# Patient Record
Sex: MALE | Race: WHITE | NOT HISPANIC OR LATINO | URBAN - METROPOLITAN AREA
[De-identification: names, ages, dates, MRNs, and addresses within clinical notes are randomized per-mention and may not be internally consistent; named-entity substitution may affect disease eponyms.]

---

## 2022-09-24 ENCOUNTER — INPATIENT (INPATIENT)
Facility: HOSPITAL | Age: 42
LOS: 8 days | Discharge: HOME | End: 2022-10-03
Attending: HOSPITALIST | Admitting: HOSPITALIST

## 2022-09-24 VITALS
TEMPERATURE: 98 F | RESPIRATION RATE: 18 BRPM | OXYGEN SATURATION: 100 % | SYSTOLIC BLOOD PRESSURE: 131 MMHG | HEART RATE: 99 BPM | DIASTOLIC BLOOD PRESSURE: 83 MMHG

## 2022-09-24 PROCEDURE — 99285 EMERGENCY DEPT VISIT HI MDM: CPT

## 2022-09-24 NOTE — ED ADULT NURSE NOTE - OBJECTIVE STATEMENT
Received pt in room 26. pt A&OX3, hx of ETOH. pt states he is here for detox c/o generalized weakness x 1 week. states drinks 10 beers everyday, reports last drink yesterday. pt appears intoxicated with slurred speech. stating that he's been here all day and did not drink alcohol today. no tremors noted, denies any headache, dizziness, n/v, no chest pain, sob, cough, fever. denies si/hi, auditory or visual hallucinations. denies drug use/ smoking. airway patent, respirations are equal and nonlabored, no respiratory distress noted. pt stable, safety maintained, side rails up, pt in hospital gown, awaiting further plan at this time.

## 2022-09-24 NOTE — ED ADULT TRIAGE NOTE - CHIEF COMPLAINT QUOTE
Pt c/o generalized weakness for 1 week. Endorses drinking 7 beers today. No tremors noted. Denies chest pain, sob, abd pain, n/v/d, fevers/chills. Per mother she picked patient up due to excessive drinking lately. Pmhx: etoh withdrawal.
No

## 2022-09-24 NOTE — ED ADULT NURSE NOTE - CHIEF COMPLAINT QUOTE
Pt c/o generalized weakness for 1 week. Endorses drinking 7 beers today. No tremors noted. Denies chest pain, sob, abd pain, n/v/d, fevers/chills. Per mother she picked patient up due to excessive drinking lately. Pmhx: etoh withdrawal.

## 2022-09-25 DIAGNOSIS — Z79.899 OTHER LONG TERM (CURRENT) DRUG THERAPY: ICD-10-CM

## 2022-09-25 DIAGNOSIS — F10.239 ALCOHOL DEPENDENCE WITH WITHDRAWAL, UNSPECIFIED: ICD-10-CM

## 2022-09-25 DIAGNOSIS — E87.1 HYPO-OSMOLALITY AND HYPONATREMIA: ICD-10-CM

## 2022-09-25 DIAGNOSIS — Z29.9 ENCOUNTER FOR PROPHYLACTIC MEASURES, UNSPECIFIED: ICD-10-CM

## 2022-09-25 DIAGNOSIS — R74.01 ELEVATION OF LEVELS OF LIVER TRANSAMINASE LEVELS: ICD-10-CM

## 2022-09-25 LAB
ALBUMIN SERPL ELPH-MCNC: 4.3 G/DL — SIGNIFICANT CHANGE UP (ref 3.3–5)
ALP SERPL-CCNC: 95 U/L — SIGNIFICANT CHANGE UP (ref 40–120)
ALT FLD-CCNC: 237 U/L — HIGH (ref 4–41)
ANION GAP SERPL CALC-SCNC: 16 MMOL/L — HIGH (ref 7–14)
ANION GAP SERPL CALC-SCNC: 19 MMOL/L — HIGH (ref 7–14)
ANISOCYTOSIS BLD QL: SLIGHT — SIGNIFICANT CHANGE UP
APAP SERPL-MCNC: <10 UG/ML — LOW (ref 15–25)
AST SERPL-CCNC: 433 U/L — HIGH (ref 4–40)
BASOPHILS # BLD AUTO: 0 K/UL — SIGNIFICANT CHANGE UP (ref 0–0.2)
BASOPHILS NFR BLD AUTO: 0 % — SIGNIFICANT CHANGE UP (ref 0–2)
BILIRUB SERPL-MCNC: 1 MG/DL — SIGNIFICANT CHANGE UP (ref 0.2–1.2)
BUN SERPL-MCNC: 5 MG/DL — LOW (ref 7–23)
BUN SERPL-MCNC: 5 MG/DL — LOW (ref 7–23)
CALCIUM SERPL-MCNC: 8.3 MG/DL — LOW (ref 8.4–10.5)
CALCIUM SERPL-MCNC: 8.5 MG/DL — SIGNIFICANT CHANGE UP (ref 8.4–10.5)
CHLORIDE SERPL-SCNC: 81 MMOL/L — LOW (ref 98–107)
CHLORIDE SERPL-SCNC: 88 MMOL/L — LOW (ref 98–107)
CO2 SERPL-SCNC: 22 MMOL/L — SIGNIFICANT CHANGE UP (ref 22–31)
CO2 SERPL-SCNC: 24 MMOL/L — SIGNIFICANT CHANGE UP (ref 22–31)
CREAT SERPL-MCNC: 0.58 MG/DL — SIGNIFICANT CHANGE UP (ref 0.5–1.3)
CREAT SERPL-MCNC: 0.59 MG/DL — SIGNIFICANT CHANGE UP (ref 0.5–1.3)
EGFR: 123 ML/MIN/1.73M2 — SIGNIFICANT CHANGE UP
EGFR: 123 ML/MIN/1.73M2 — SIGNIFICANT CHANGE UP
EOSINOPHIL # BLD AUTO: 0 K/UL — SIGNIFICANT CHANGE UP (ref 0–0.5)
EOSINOPHIL NFR BLD AUTO: 0 % — SIGNIFICANT CHANGE UP (ref 0–6)
ETHANOL SERPL-MCNC: 378 MG/DL — HIGH
FLUAV AG NPH QL: SIGNIFICANT CHANGE UP
FLUBV AG NPH QL: SIGNIFICANT CHANGE UP
GLUCOSE SERPL-MCNC: 100 MG/DL — HIGH (ref 70–99)
GLUCOSE SERPL-MCNC: 87 MG/DL — SIGNIFICANT CHANGE UP (ref 70–99)
HCT VFR BLD CALC: 34.6 % — LOW (ref 39–50)
HGB BLD-MCNC: 12.7 G/DL — LOW (ref 13–17)
IANC: 2.99 K/UL — SIGNIFICANT CHANGE UP (ref 1.8–7.4)
LYMPHOCYTES # BLD AUTO: 0.41 K/UL — LOW (ref 1–3.3)
LYMPHOCYTES # BLD AUTO: 10.5 % — LOW (ref 13–44)
MACROCYTES BLD QL: SLIGHT — SIGNIFICANT CHANGE UP
MCHC RBC-ENTMCNC: 34.7 PG — HIGH (ref 27–34)
MCHC RBC-ENTMCNC: 36.7 GM/DL — HIGH (ref 32–36)
MCV RBC AUTO: 94.5 FL — SIGNIFICANT CHANGE UP (ref 80–100)
MONOCYTES # BLD AUTO: 0.24 K/UL — SIGNIFICANT CHANGE UP (ref 0–0.9)
MONOCYTES NFR BLD AUTO: 6.1 % — SIGNIFICANT CHANGE UP (ref 2–14)
NEUTROPHILS # BLD AUTO: 3.16 K/UL — SIGNIFICANT CHANGE UP (ref 1.8–7.4)
NEUTROPHILS NFR BLD AUTO: 80.7 % — HIGH (ref 43–77)
NEUTS BAND # BLD: 0.9 % — SIGNIFICANT CHANGE UP (ref 0–6)
OSMOLALITY SERPL: 347 MOSM/KG — HIGH (ref 275–295)
PLAT MORPH BLD: NORMAL — SIGNIFICANT CHANGE UP
PLATELET # BLD AUTO: 55 K/UL — LOW (ref 150–400)
PLATELET COUNT - ESTIMATE: ABNORMAL
POLYCHROMASIA BLD QL SMEAR: SLIGHT — SIGNIFICANT CHANGE UP
POTASSIUM SERPL-MCNC: 4.4 MMOL/L — SIGNIFICANT CHANGE UP (ref 3.5–5.3)
POTASSIUM SERPL-MCNC: 4.5 MMOL/L — SIGNIFICANT CHANGE UP (ref 3.5–5.3)
POTASSIUM SERPL-SCNC: 4.4 MMOL/L — SIGNIFICANT CHANGE UP (ref 3.5–5.3)
POTASSIUM SERPL-SCNC: 4.5 MMOL/L — SIGNIFICANT CHANGE UP (ref 3.5–5.3)
PROT SERPL-MCNC: 7.3 G/DL — SIGNIFICANT CHANGE UP (ref 6–8.3)
RBC # BLD: 3.66 M/UL — LOW (ref 4.2–5.8)
RBC # FLD: 12.6 % — SIGNIFICANT CHANGE UP (ref 10.3–14.5)
RBC BLD AUTO: NORMAL — SIGNIFICANT CHANGE UP
RSV RNA NPH QL NAA+NON-PROBE: SIGNIFICANT CHANGE UP
SALICYLATES SERPL-MCNC: <0.3 MG/DL — LOW (ref 15–30)
SARS-COV-2 RNA SPEC QL NAA+PROBE: SIGNIFICANT CHANGE UP
SODIUM SERPL-SCNC: 122 MMOL/L — LOW (ref 135–145)
SODIUM SERPL-SCNC: 128 MMOL/L — LOW (ref 135–145)
TOXICOLOGY SCREEN, DRUGS OF ABUSE, SERUM RESULT: SIGNIFICANT CHANGE UP
VARIANT LYMPHS # BLD: 1.8 % — SIGNIFICANT CHANGE UP (ref 0–6)
WBC # BLD: 3.87 K/UL — SIGNIFICANT CHANGE UP (ref 3.8–10.5)
WBC # FLD AUTO: 3.87 K/UL — SIGNIFICANT CHANGE UP (ref 3.8–10.5)

## 2022-09-25 PROCEDURE — 99223 1ST HOSP IP/OBS HIGH 75: CPT

## 2022-09-25 RX ORDER — SODIUM CHLORIDE 9 MG/ML
1000 INJECTION INTRAMUSCULAR; INTRAVENOUS; SUBCUTANEOUS ONCE
Refills: 0 | Status: COMPLETED | OUTPATIENT
Start: 2022-09-25 | End: 2022-09-25

## 2022-09-25 RX ORDER — THIAMINE MONONITRATE (VIT B1) 100 MG
100 TABLET ORAL ONCE
Refills: 0 | Status: COMPLETED | OUTPATIENT
Start: 2022-09-25 | End: 2022-09-25

## 2022-09-25 RX ORDER — METOPROLOL TARTRATE 50 MG
25 TABLET ORAL DAILY
Refills: 0 | Status: DISCONTINUED | OUTPATIENT
Start: 2022-09-25 | End: 2022-10-03

## 2022-09-25 RX ORDER — FOLIC ACID 0.8 MG
1 TABLET ORAL DAILY
Refills: 0 | Status: DISCONTINUED | OUTPATIENT
Start: 2022-09-25 | End: 2022-09-27

## 2022-09-25 RX ORDER — SODIUM CHLORIDE 9 MG/ML
500 INJECTION INTRAMUSCULAR; INTRAVENOUS; SUBCUTANEOUS ONCE
Refills: 0 | Status: COMPLETED | OUTPATIENT
Start: 2022-09-25 | End: 2022-09-25

## 2022-09-25 RX ORDER — ONDANSETRON 8 MG/1
4 TABLET, FILM COATED ORAL EVERY 8 HOURS
Refills: 0 | Status: DISCONTINUED | OUTPATIENT
Start: 2022-09-25 | End: 2022-09-27

## 2022-09-25 RX ORDER — THIAMINE MONONITRATE (VIT B1) 100 MG
500 TABLET ORAL ONCE
Refills: 0 | Status: COMPLETED | OUTPATIENT
Start: 2022-09-25 | End: 2022-09-25

## 2022-09-25 RX ORDER — THIAMINE MONONITRATE (VIT B1) 100 MG
100 TABLET ORAL DAILY
Refills: 0 | Status: COMPLETED | OUTPATIENT
Start: 2022-09-25 | End: 2022-09-27

## 2022-09-25 RX ADMIN — Medication 4 MILLIGRAM(S): at 10:15

## 2022-09-25 RX ADMIN — Medication 4 MILLIGRAM(S): at 22:09

## 2022-09-25 RX ADMIN — Medication 100 MILLIGRAM(S): at 11:06

## 2022-09-25 RX ADMIN — Medication 100 MILLIGRAM(S): at 22:09

## 2022-09-25 RX ADMIN — SODIUM CHLORIDE 500 MILLILITER(S): 9 INJECTION INTRAMUSCULAR; INTRAVENOUS; SUBCUTANEOUS at 05:53

## 2022-09-25 RX ADMIN — Medication 4 MILLIGRAM(S): at 17:52

## 2022-09-25 RX ADMIN — Medication 25 MILLIGRAM(S): at 22:09

## 2022-09-25 RX ADMIN — Medication 1 MILLIGRAM(S): at 07:56

## 2022-09-25 RX ADMIN — Medication 25 MILLIGRAM(S): at 07:15

## 2022-09-25 RX ADMIN — Medication 4 MILLIGRAM(S): at 13:49

## 2022-09-25 RX ADMIN — Medication 1 TABLET(S): at 11:06

## 2022-09-25 RX ADMIN — Medication 1 MILLIGRAM(S): at 11:06

## 2022-09-25 RX ADMIN — SODIUM CHLORIDE 1000 MILLILITER(S): 9 INJECTION INTRAMUSCULAR; INTRAVENOUS; SUBCUTANEOUS at 01:09

## 2022-09-25 RX ADMIN — Medication 105 MILLIGRAM(S): at 02:49

## 2022-09-25 RX ADMIN — Medication 50 MILLIGRAM(S): at 01:09

## 2022-09-25 NOTE — ED PROVIDER NOTE - NS ED ROS FT
CONST: no fevers, no chills. +anxiety  EYES: no pain, no vision changes  ENT: no sore throat, no ear pain, no change in hearing  CV: no chest pain, no leg swelling  RESP: no shortness of breath, no cough  ABD: no abdominal pain, no nausea, no vomiting, no diarrhea  : no dysuria, no flank pain, no hematuria  MSK: no back pain, no extremity pain  NEURO: no headache. +tremors  SKIN:  no rash

## 2022-09-25 NOTE — CHART NOTE - NSCHARTNOTEFT_GEN_A_CORE
The Drug Utilization Report below displays all of the controlled substance prescriptions, if any, that your patient has filled in the last twelve months. The information displayed on this report is compiled from pharmacy submissions to the Department, and accurately reflects the information as submitted by the pharmacies.    This report was requested by: Naomi Chacko | Reference #: [id number]    Others' Prescriptions  Patient Name: Jeronimo AlvaradoiBirjacqueline Date: 1980  Address: [address] [place] [id number]: Male  Rx Written	Rx Dispensed	Drug	Quantity	Days Supply	Prescriber Name	Prescriber Marie #	Payment Method  07/20/2022	07/20/2022	clonazepam 0.5 mg tablet	60	30	Isai Whaley MD	[id number]	Insurance  Dispenser [facility] #[id number]  03/23/2022	04/05/2022	clonazepam 0.5 mg tablet	60	30	Isai Whaley MD	[id number]	Insurance  Dispenser [facility] #[id number]  * - Drugs marked with an asterisk are compound drugs. If the compound drug is made up of more than one controlled substance, then each controlled substance will be a separate row in the table.

## 2022-09-25 NOTE — H&P ADULT - PROBLEM SELECTOR PLAN 1
-S/p librium (75mg) and ativan (1mg IV) in the ED  -Resume ativan taper  -CIWA monitoring  -c/w thiamine 100mg QD  -c/w Folic acid   -Pt open to possible naltrexone initiation inpatient  -Psych eval in the AM for naltrexone

## 2022-09-25 NOTE — H&P ADULT - NSHPLABSRESULTS_GEN_ALL_CORE
12.7   3.87  )-----------( 55       ( 25 Sep 2022 01:10 )             34.6       09-25    128<L>  |  88<L>  |  5<L>  ----------------------------<  87  4.4   |  24  |  0.59    Ca    8.3<L>      25 Sep 2022 03:30    TPro  7.3  /  Alb  4.3  /  TBili  1.0  /  DBili  x   /  AST  433<H>  /  ALT  237<H>  /  AlkPhos  95  09-25                            CAPILLARY BLOOD GLUCOSE      POCT Blood Glucose.: 127 mg/dL (24 Sep 2022 21:46)              RADIOLOGY & ADDITIONAL TESTS:    Imaging personally reviewed.    Consultant(s) notes reviewed.    Care discussed with consultants and other providers.

## 2022-09-25 NOTE — H&P ADULT - ASSESSMENT
42y male pt PMHx ETOH use disorder presenting w/ tremors and requesting detox, admitted for ETOH withdrawal

## 2022-09-25 NOTE — H&P ADULT - NSHPPHYSICALEXAM_GEN_ALL_CORE
GENERAL: NAD, +b/l hand tremors, +tongue fasciculations   HEAD:  Atraumatic, Normocephalic  EYES: EOMI, conjunctiva and sclera clear  NECK: Supple  CHEST/LUNG: Clear to auscultation bilaterally; No wheeze, rhonchi, crackles or rales  HEART: Regular rate and rhythm; No murmurs, rubs, or gallops  ABDOMEN: Soft, Nontender, Nondistended; Bowel sounds present  EXTREMITIES:  2+ Peripheral Pulses, No clubbing, cyanosis, or edema  PSYCH: AAOx3  NEUROLOGY: non-focal  SKIN: No rashes or lesions

## 2022-09-25 NOTE — H&P ADULT - PROBLEM SELECTOR PLAN 2
-Hyponatremia noted on admission to 122. Likely 2/2 beer potomania  -s/p 1.5L IVF  -Improving  -Obtain serum osm, urine Na, urine Osm  -Monitor BMPs

## 2022-09-25 NOTE — H&P ADULT - HISTORY OF PRESENT ILLNESS
42y male pt PMHx ETOH use disorder presenting w/ tremors and requesting detox. Pt reports chronic hx of ETOH use (15-16 bottles of beer/day), last drink 2 days ago. States he has successful quit drinking in the past but resumed a year ago due to various stresses; mother facing eviction and family conflicts. Reports feeling like he has no support system/people to talk to. Reports going to therapy intermittent. Symptomatically, pt reports feeling "shaky" and anxious. ROS also positive for nausea (no vomiting) and generalized malaise. Otherwise negative. Denies fevers, chills, chest pain, SOB, abdominal pain, constipation, diarrhea.    In the E.D, HR 140s otherwise hemodynamically stable. Labs sig for ETOH level 378, hyponatremia (122) and transaminitis. Pt admitted for further management.

## 2022-09-25 NOTE — H&P ADULT - PROBLEM SELECTOR PLAN 3
-AST/ALT elevation noted on admission suspect in setting of ETOH use disorder +/- NAFLD  -Obtain acute hepatitis panel  -Obtain Lipid profile  -Trend LFTs  -Consider RUQ US if w/ worsening transaminitis  -Avoid hepatotoxic agents

## 2022-09-25 NOTE — ED PROVIDER NOTE - ATTENDING CONTRIBUTION TO CARE
I performed a face-to-face evaluation of the patient and performed a history and physical examination along with the resident or ACP, and/or medical student above.  I agree with the history and physical examination as documented by the resident or ACP, and/or medical student above.  Rosanne:  41yo M w/ self-reported pmh of etoh abuse, reports drinking >10beers daily since covid epidemic began, stating last drink was 1.5days ago, comes in c/o hand tremors, anxiety, and palpitations, asking for detox. Endorses occasionally smoking marijuana, none for "a long time". Denies illicit drug use. Pt is tachycardic w/ b/l hand tremors and mild tongue fasciculations. Labs, fluids, TBA.

## 2022-09-25 NOTE — ED PROVIDER NOTE - CLINICAL SUMMARY MEDICAL DECISION MAKING FREE TEXT BOX
42y male pt PMHx alcohol dependence who presents to ED requesting alcohol detox. In the past has done detox alone. Coming in with hand tremors and anxiety. Endorsing last drink was one day ago. Patient drinks beer daily since covid pandemic. No hallucinations, SI, HI. Will draw labs, ekg. TBA for alcohol withdrawal

## 2022-09-25 NOTE — ED PROVIDER NOTE - PHYSICAL EXAMINATION
GENERAL: Awake, alert, NAD  HEENT: NC/AT, moist mucous membranes, EOMI. No tongue fasciculations  LUNGS: CTAB, no wheezes or crackles   CARDIAC: RRR, no m/r/g  ABDOMEN: Soft, non tender, non distended, no rebound, no guarding  BACK: No midline spinal tenderness, no CVA tenderness  EXT: No edema, no calf tenderness,  no deformities. BL hand tremors.   NEURO:  Moving all extremities.  SKIN: Warm and dry. No rash.

## 2022-09-25 NOTE — ED PROVIDER NOTE - OBJECTIVE STATEMENT
42y male pt PMHx alcohol withdrawal and alcohol use disorder who presents to ED requesting detox. Endorsing drinking daily since covid. Drinks beer daily. Last drink endorsed about 1 day ago. Of note, tachycardic, anxious, and hand tremors. Denies hallucinations, chest pain, n/v.     On exam, hand tremors. no fasciculations.

## 2022-09-25 NOTE — H&P ADULT - PROBLEM SELECTOR PLAN 4
-Pt on Toprol 25mg QD, unclear reason at this time   -Will resume w/ hold parameters  -Obtain further clarification from PCP if able

## 2022-09-25 NOTE — H&P ADULT - NSHPREVIEWOFSYSTEMS_GEN_ALL_CORE
CONSTITUTIONAL:  No weight loss, fever, chills, weakness, +fatigue.  HEENT: No visual loss, blurred vision, No hearing loss, sneezing, congestion, runny nose or sore throat.  SKIN:  No rash or itching.  CARDIOVASCULAR:  No chest pain, chest pressure or chest discomfort. No palpitations.  RESPIRATORY:  No shortness of breath, cough or sputum.  GASTROINTESTINAL:  +nausea, no vomiting or diarrhea. No abdominal pain or blood.  GENITOURINARY:  Denies hematuria, dysuria.   NEUROLOGICAL:  No headache, dizziness. No change in bowel or bladder control.  MUSCULOSKELETAL:  No muscle, back pain, joint pain or stiffness.  HEMATOLOGIC:  No bleeding or bruising.  ENDOCRINOLOGIC:  No reports of sweating, cold or heat intolerance. No polyuria or polydipsia.

## 2022-09-26 DIAGNOSIS — F41.9 ANXIETY DISORDER, UNSPECIFIED: ICD-10-CM

## 2022-09-26 LAB
ALBUMIN SERPL ELPH-MCNC: 4.1 G/DL — SIGNIFICANT CHANGE UP (ref 3.3–5)
ALBUMIN SERPL ELPH-MCNC: 4.6 G/DL — SIGNIFICANT CHANGE UP (ref 3.3–5)
ALP SERPL-CCNC: 100 U/L — SIGNIFICANT CHANGE UP (ref 40–120)
ALP SERPL-CCNC: 115 U/L — SIGNIFICANT CHANGE UP (ref 40–120)
ALT FLD-CCNC: 168 U/L — HIGH (ref 4–41)
ALT FLD-CCNC: 191 U/L — HIGH (ref 4–41)
ANION GAP SERPL CALC-SCNC: 15 MMOL/L — HIGH (ref 7–14)
AST SERPL-CCNC: 213 U/L — HIGH (ref 4–40)
AST SERPL-CCNC: 229 U/L — HIGH (ref 4–40)
BILIRUB SERPL-MCNC: 1.6 MG/DL — HIGH (ref 0.2–1.2)
BILIRUB SERPL-MCNC: 1.7 MG/DL — HIGH (ref 0.2–1.2)
BUN SERPL-MCNC: 6 MG/DL — LOW (ref 7–23)
BUN SERPL-MCNC: 7 MG/DL — SIGNIFICANT CHANGE UP (ref 7–23)
BUN SERPL-MCNC: 8 MG/DL — SIGNIFICANT CHANGE UP (ref 7–23)
CALCIUM SERPL-MCNC: 9.5 MG/DL — SIGNIFICANT CHANGE UP (ref 8.4–10.5)
CALCIUM SERPL-MCNC: 9.5 MG/DL — SIGNIFICANT CHANGE UP (ref 8.4–10.5)
CALCIUM SERPL-MCNC: 9.8 MG/DL — SIGNIFICANT CHANGE UP (ref 8.4–10.5)
CHLORIDE SERPL-SCNC: 90 MMOL/L — LOW (ref 98–107)
CHLORIDE SERPL-SCNC: 92 MMOL/L — LOW (ref 98–107)
CHLORIDE SERPL-SCNC: 93 MMOL/L — LOW (ref 98–107)
CHLORIDE UR-SCNC: 58 MMOL/L — SIGNIFICANT CHANGE UP
CHOLEST SERPL-MCNC: 196 MG/DL — SIGNIFICANT CHANGE UP
CO2 SERPL-SCNC: 23 MMOL/L — SIGNIFICANT CHANGE UP (ref 22–31)
CO2 SERPL-SCNC: 25 MMOL/L — SIGNIFICANT CHANGE UP (ref 22–31)
CO2 SERPL-SCNC: 27 MMOL/L — SIGNIFICANT CHANGE UP (ref 22–31)
CREAT SERPL-MCNC: 0.64 MG/DL — SIGNIFICANT CHANGE UP (ref 0.5–1.3)
CREAT SERPL-MCNC: 0.68 MG/DL — SIGNIFICANT CHANGE UP (ref 0.5–1.3)
CREAT SERPL-MCNC: 0.76 MG/DL — SIGNIFICANT CHANGE UP (ref 0.5–1.3)
EGFR: 114 ML/MIN/1.73M2 — SIGNIFICANT CHANGE UP
EGFR: 118 ML/MIN/1.73M2 — SIGNIFICANT CHANGE UP
EGFR: 120 ML/MIN/1.73M2 — SIGNIFICANT CHANGE UP
GLUCOSE SERPL-MCNC: 132 MG/DL — HIGH (ref 70–99)
GLUCOSE SERPL-MCNC: 138 MG/DL — HIGH (ref 70–99)
GLUCOSE SERPL-MCNC: 93 MG/DL — SIGNIFICANT CHANGE UP (ref 70–99)
HAV IGM SER-ACNC: SIGNIFICANT CHANGE UP
HBV CORE IGM SER-ACNC: SIGNIFICANT CHANGE UP
HBV SURFACE AG SER-ACNC: SIGNIFICANT CHANGE UP
HCT VFR BLD CALC: 37.3 % — LOW (ref 39–50)
HCT VFR BLD CALC: 37.5 % — LOW (ref 39–50)
HCV AB S/CO SERPL IA: 0.07 S/CO — SIGNIFICANT CHANGE UP (ref 0–0.99)
HCV AB SERPL-IMP: SIGNIFICANT CHANGE UP
HDLC SERPL-MCNC: 113 MG/DL — SIGNIFICANT CHANGE UP
HGB BLD-MCNC: 13 G/DL — SIGNIFICANT CHANGE UP (ref 13–17)
HGB BLD-MCNC: 13.4 G/DL — SIGNIFICANT CHANGE UP (ref 13–17)
LIPID PNL WITH DIRECT LDL SERPL: 75 MG/DL — SIGNIFICANT CHANGE UP
MAGNESIUM SERPL-MCNC: 1.6 MG/DL — SIGNIFICANT CHANGE UP (ref 1.6–2.6)
MCHC RBC-ENTMCNC: 34.7 GM/DL — SIGNIFICANT CHANGE UP (ref 32–36)
MCHC RBC-ENTMCNC: 35 PG — HIGH (ref 27–34)
MCHC RBC-ENTMCNC: 35.7 PG — HIGH (ref 27–34)
MCHC RBC-ENTMCNC: 35.9 GM/DL — SIGNIFICANT CHANGE UP (ref 32–36)
MCV RBC AUTO: 101.1 FL — HIGH (ref 80–100)
MCV RBC AUTO: 99.5 FL — SIGNIFICANT CHANGE UP (ref 80–100)
NON HDL CHOLESTEROL: 83 MG/DL — SIGNIFICANT CHANGE UP
NRBC # BLD: 0 /100 WBCS — SIGNIFICANT CHANGE UP (ref 0–0)
NRBC # BLD: 0 /100 WBCS — SIGNIFICANT CHANGE UP (ref 0–0)
NRBC # FLD: 0 K/UL — SIGNIFICANT CHANGE UP (ref 0–0)
NRBC # FLD: 0 K/UL — SIGNIFICANT CHANGE UP (ref 0–0)
OSMOLALITY UR: 327 MOSM/KG — SIGNIFICANT CHANGE UP (ref 50–1200)
PHOSPHATE SERPL-MCNC: 2.9 MG/DL — SIGNIFICANT CHANGE UP (ref 2.5–4.5)
PLATELET # BLD AUTO: 28 K/UL — LOW (ref 150–400)
PLATELET # BLD AUTO: 46 K/UL — LOW (ref 150–400)
POTASSIUM SERPL-MCNC: 3.2 MMOL/L — LOW (ref 3.5–5.3)
POTASSIUM SERPL-MCNC: 3.3 MMOL/L — LOW (ref 3.5–5.3)
POTASSIUM SERPL-MCNC: 3.8 MMOL/L — SIGNIFICANT CHANGE UP (ref 3.5–5.3)
POTASSIUM SERPL-SCNC: 3.2 MMOL/L — LOW (ref 3.5–5.3)
POTASSIUM SERPL-SCNC: 3.3 MMOL/L — LOW (ref 3.5–5.3)
POTASSIUM SERPL-SCNC: 3.8 MMOL/L — SIGNIFICANT CHANGE UP (ref 3.5–5.3)
POTASSIUM UR-SCNC: 23.8 MMOL/L — SIGNIFICANT CHANGE UP
PROT SERPL-MCNC: 7.2 G/DL — SIGNIFICANT CHANGE UP (ref 6–8.3)
PROT SERPL-MCNC: 7.7 G/DL — SIGNIFICANT CHANGE UP (ref 6–8.3)
RBC # BLD: 3.71 M/UL — LOW (ref 4.2–5.8)
RBC # BLD: 3.75 M/UL — LOW (ref 4.2–5.8)
RBC # FLD: 13 % — SIGNIFICANT CHANGE UP (ref 10.3–14.5)
RBC # FLD: 13.4 % — SIGNIFICANT CHANGE UP (ref 10.3–14.5)
SODIUM SERPL-SCNC: 128 MMOL/L — LOW (ref 135–145)
SODIUM SERPL-SCNC: 132 MMOL/L — LOW (ref 135–145)
SODIUM SERPL-SCNC: 135 MMOL/L — SIGNIFICANT CHANGE UP (ref 135–145)
SODIUM UR-SCNC: 57 MMOL/L — SIGNIFICANT CHANGE UP
TRIGL SERPL-MCNC: 39 MG/DL — SIGNIFICANT CHANGE UP
WBC # BLD: 2.88 K/UL — LOW (ref 3.8–10.5)
WBC # BLD: 3.13 K/UL — LOW (ref 3.8–10.5)
WBC # FLD AUTO: 2.88 K/UL — LOW (ref 3.8–10.5)
WBC # FLD AUTO: 3.13 K/UL — LOW (ref 3.8–10.5)

## 2022-09-26 PROCEDURE — 99233 SBSQ HOSP IP/OBS HIGH 50: CPT

## 2022-09-26 RX ORDER — POTASSIUM CHLORIDE 20 MEQ
40 PACKET (EA) ORAL ONCE
Refills: 0 | Status: COMPLETED | OUTPATIENT
Start: 2022-09-26 | End: 2022-09-26

## 2022-09-26 RX ORDER — SODIUM CHLORIDE 9 MG/ML
1000 INJECTION INTRAMUSCULAR; INTRAVENOUS; SUBCUTANEOUS
Refills: 0 | Status: DISCONTINUED | OUTPATIENT
Start: 2022-09-26 | End: 2022-09-26

## 2022-09-26 RX ORDER — INFLUENZA VIRUS VACCINE 15; 15; 15; 15 UG/.5ML; UG/.5ML; UG/.5ML; UG/.5ML
0.5 SUSPENSION INTRAMUSCULAR ONCE
Refills: 0 | Status: DISCONTINUED | OUTPATIENT
Start: 2022-09-26 | End: 2022-10-03

## 2022-09-26 RX ADMIN — Medication 2 MILLIGRAM(S): at 21:57

## 2022-09-26 RX ADMIN — Medication 25 MILLIGRAM(S): at 06:16

## 2022-09-26 RX ADMIN — Medication 4 MILLIGRAM(S): at 06:17

## 2022-09-26 RX ADMIN — Medication 3 MILLIGRAM(S): at 14:42

## 2022-09-26 RX ADMIN — Medication 1 TABLET(S): at 14:43

## 2022-09-26 RX ADMIN — Medication 3 MILLIGRAM(S): at 23:00

## 2022-09-26 RX ADMIN — ONDANSETRON 4 MILLIGRAM(S): 8 TABLET, FILM COATED ORAL at 21:57

## 2022-09-26 RX ADMIN — Medication 3 MILLIGRAM(S): at 11:13

## 2022-09-26 RX ADMIN — ONDANSETRON 4 MILLIGRAM(S): 8 TABLET, FILM COATED ORAL at 14:50

## 2022-09-26 RX ADMIN — SODIUM CHLORIDE 75 MILLILITER(S): 9 INJECTION INTRAMUSCULAR; INTRAVENOUS; SUBCUTANEOUS at 11:17

## 2022-09-26 RX ADMIN — Medication 40 MILLIEQUIVALENT(S): at 18:30

## 2022-09-26 RX ADMIN — Medication 4 MILLIGRAM(S): at 02:59

## 2022-09-26 RX ADMIN — Medication 3 MILLIGRAM(S): at 18:27

## 2022-09-26 RX ADMIN — Medication 100 MILLIGRAM(S): at 14:43

## 2022-09-26 RX ADMIN — Medication 1 MILLIGRAM(S): at 14:43

## 2022-09-26 NOTE — PATIENT PROFILE ADULT - FALL HARM RISK - HARM RISK INTERVENTIONS
Assistance with ambulation/Assistance OOB with selected safe patient handling equipment/Communicate Risk of Fall with Harm to all staff/Monitor for mental status changes/Monitor gait and stability/Reinforce activity limits and safety measures with patient and family/Tailored Fall Risk Interventions/Toileting schedule using arm’s reach rule for commode and bathroom/Use of alarms - bed, chair and/or voice tab/Visual Cue: Yellow wristband and red socks/Bed in lowest position, wheels locked, appropriate side rails in place/Call bell, personal items and telephone in reach/Instruct patient to call for assistance before getting out of bed or chair/Non-slip footwear when patient is out of bed/[place] to call system/Physically safe environment - no spills, clutter or unnecessary equipment/Purposeful Proactive Rounding/Room/bathroom lighting operational, light cord in reach

## 2022-09-26 NOTE — PATIENT PROFILE ADULT - ARRIVAL FROM
Home Topical Sulfur Applications Counseling: Topical Sulfur Counseling: Patient counseled that this medication may cause skin irritation or allergic reactions.  In the event of skin irritation, the patient was advised to reduce the amount of the drug applied or use it less frequently.   The patient verbalized understanding of the proper use and possible adverse effects of topical sulfur application.  All of the patient's questions and concerns were addressed.

## 2022-09-26 NOTE — ED ADULT NURSE REASSESSMENT NOTE - NS ED NURSE REASSESS COMMENT FT1
ACP called regarding CIWA score of 10 for patient, patient medicated w/ librium, recommend IV 2mg Ativan to treat sx. Pt hemodynamically stable, will continue to monitor
Break coverage RN: Pt A&Ox4 resting on stretcher. Respirations even and unlabored, NSR on monitor, sating 99% on RA. CIWA as per flowsheet. Pt well appearing, NAD noted. Pending bed assignment. Bed in lowest, safety maintained. Pt aware to call for assistance prior to getting up.
Received report from night RN: pt A&Ox4, ambulatory, respirations are even and unlabored, sating at 100% on RA, sinus tachycardia on cardiac monitor, VSS, CIWA score went down, pt appears comfortable and in no acute distress at this time.
pt changed into hospital clothing. Pt's mother took his phone and wallet in triage area.
report given to ESSU 3 RN. meal provided. comfort care provided. respirations are even and un labored. safety precautions maintained.
Pt tachycardic to 140s, appears to be shaking, see new CIWA score in flowsheet, ACP aware, x1 time dose librium to be ordered, pt hemodynamically stable @ this time
pt remains laying in stretcher, instructed to stay in stretcher and not get up without assistance. side rails remain up. no injuries noted at this time. pt in no distress. will endorse report to oncoming RN for further plan

## 2022-09-27 LAB
ALBUMIN SERPL ELPH-MCNC: 4.8 G/DL — SIGNIFICANT CHANGE UP (ref 3.3–5)
ALP SERPL-CCNC: 124 U/L — HIGH (ref 40–120)
ALT FLD-CCNC: 150 U/L — HIGH (ref 4–41)
ANION GAP SERPL CALC-SCNC: 15 MMOL/L — HIGH (ref 7–14)
AST SERPL-CCNC: 128 U/L — HIGH (ref 4–40)
BILIRUB SERPL-MCNC: 1.3 MG/DL — HIGH (ref 0.2–1.2)
BUN SERPL-MCNC: 9 MG/DL — SIGNIFICANT CHANGE UP (ref 7–23)
CALCIUM SERPL-MCNC: 10.1 MG/DL — SIGNIFICANT CHANGE UP (ref 8.4–10.5)
CHLORIDE SERPL-SCNC: 97 MMOL/L — LOW (ref 98–107)
CO2 SERPL-SCNC: 26 MMOL/L — SIGNIFICANT CHANGE UP (ref 22–31)
CREAT SERPL-MCNC: 0.81 MG/DL — SIGNIFICANT CHANGE UP (ref 0.5–1.3)
EGFR: 112 ML/MIN/1.73M2 — SIGNIFICANT CHANGE UP
GLUCOSE SERPL-MCNC: 108 MG/DL — HIGH (ref 70–99)
HCT VFR BLD CALC: 38.3 % — LOW (ref 39–50)
HGB BLD-MCNC: 13.8 G/DL — SIGNIFICANT CHANGE UP (ref 13–17)
MAGNESIUM SERPL-MCNC: 1.5 MG/DL — LOW (ref 1.6–2.6)
MCHC RBC-ENTMCNC: 35.8 PG — HIGH (ref 27–34)
MCHC RBC-ENTMCNC: 36 GM/DL — SIGNIFICANT CHANGE UP (ref 32–36)
MCV RBC AUTO: 99.2 FL — SIGNIFICANT CHANGE UP (ref 80–100)
NRBC # BLD: 0 /100 WBCS — SIGNIFICANT CHANGE UP (ref 0–0)
NRBC # FLD: 0 K/UL — SIGNIFICANT CHANGE UP (ref 0–0)
PHOSPHATE SERPL-MCNC: 4.2 MG/DL — SIGNIFICANT CHANGE UP (ref 2.5–4.5)
PLATELET # BLD AUTO: 61 K/UL — LOW (ref 150–400)
POTASSIUM SERPL-MCNC: 3.2 MMOL/L — LOW (ref 3.5–5.3)
POTASSIUM SERPL-SCNC: 3.2 MMOL/L — LOW (ref 3.5–5.3)
PROT SERPL-MCNC: 8 G/DL — SIGNIFICANT CHANGE UP (ref 6–8.3)
RBC # BLD: 3.86 M/UL — LOW (ref 4.2–5.8)
RBC # FLD: 13 % — SIGNIFICANT CHANGE UP (ref 10.3–14.5)
SODIUM SERPL-SCNC: 138 MMOL/L — SIGNIFICANT CHANGE UP (ref 135–145)
WBC # BLD: 3.79 K/UL — LOW (ref 3.8–10.5)
WBC # FLD AUTO: 3.79 K/UL — LOW (ref 3.8–10.5)

## 2022-09-27 PROCEDURE — 70450 CT HEAD/BRAIN W/O DYE: CPT | Mod: 26

## 2022-09-27 PROCEDURE — 99233 SBSQ HOSP IP/OBS HIGH 50: CPT

## 2022-09-27 RX ORDER — THIAMINE MONONITRATE (VIT B1) 100 MG
100 TABLET ORAL DAILY
Refills: 0 | Status: DISCONTINUED | OUTPATIENT
Start: 2022-09-27 | End: 2022-09-27

## 2022-09-27 RX ORDER — POTASSIUM CHLORIDE 20 MEQ
40 PACKET (EA) ORAL ONCE
Refills: 0 | Status: COMPLETED | OUTPATIENT
Start: 2022-09-27 | End: 2022-09-27

## 2022-09-27 RX ORDER — THIAMINE MONONITRATE (VIT B1) 100 MG
100 TABLET ORAL DAILY
Refills: 0 | Status: COMPLETED | OUTPATIENT
Start: 2022-09-27 | End: 2022-09-29

## 2022-09-27 RX ORDER — MAGNESIUM OXIDE 400 MG ORAL TABLET 241.3 MG
400 TABLET ORAL
Refills: 0 | Status: COMPLETED | OUTPATIENT
Start: 2022-09-27 | End: 2022-09-28

## 2022-09-27 RX ORDER — FOLIC ACID 0.8 MG
1 TABLET ORAL DAILY
Refills: 0 | Status: DISCONTINUED | OUTPATIENT
Start: 2022-09-27 | End: 2022-10-03

## 2022-09-27 RX ORDER — FOLIC ACID 0.8 MG
1 TABLET ORAL DAILY
Refills: 0 | Status: DISCONTINUED | OUTPATIENT
Start: 2022-09-27 | End: 2022-09-27

## 2022-09-27 RX ADMIN — Medication 2 MILLIGRAM(S): at 14:57

## 2022-09-27 RX ADMIN — Medication 25 MILLIGRAM(S): at 06:20

## 2022-09-27 RX ADMIN — Medication 2 MILLIGRAM(S): at 06:21

## 2022-09-27 RX ADMIN — Medication 2 MILLIGRAM(S): at 04:51

## 2022-09-27 RX ADMIN — Medication 40 MILLIEQUIVALENT(S): at 12:51

## 2022-09-27 RX ADMIN — Medication 2 MILLIGRAM(S): at 18:15

## 2022-09-27 RX ADMIN — Medication 100 MILLIGRAM(S): at 12:51

## 2022-09-27 RX ADMIN — Medication 1 TABLET(S): at 10:04

## 2022-09-27 RX ADMIN — Medication 2 MILLIGRAM(S): at 22:53

## 2022-09-27 RX ADMIN — Medication 2 MILLIGRAM(S): at 10:05

## 2022-09-27 RX ADMIN — MAGNESIUM OXIDE 400 MG ORAL TABLET 400 MILLIGRAM(S): 241.3 TABLET ORAL at 12:51

## 2022-09-27 RX ADMIN — Medication 2 MILLIGRAM(S): at 02:47

## 2022-09-27 RX ADMIN — Medication 1 MILLIGRAM(S): at 12:50

## 2022-09-27 RX ADMIN — ONDANSETRON 4 MILLIGRAM(S): 8 TABLET, FILM COATED ORAL at 06:20

## 2022-09-27 RX ADMIN — Medication 1 MILLIGRAM(S): at 10:04

## 2022-09-27 RX ADMIN — Medication 100 MILLIGRAM(S): at 10:05

## 2022-09-27 RX ADMIN — Medication 1 TABLET(S): at 12:51

## 2022-09-27 RX ADMIN — MAGNESIUM OXIDE 400 MG ORAL TABLET 400 MILLIGRAM(S): 241.3 TABLET ORAL at 18:14

## 2022-09-27 NOTE — PHYSICAL THERAPY INITIAL EVALUATION ADULT - ASSISTIVE DEVICE:SIT/SUPINE, REHAB EVAL
bed rails [[email or web address]_ALL_OB_FT:[email or web address]],[NS_DeliveryAssist1_OBGYN_ALL_OB_FT:[email or web address]=],[NS_DeliveryRN_OBGYN_ALL_OB_FT:[id number]]

## 2022-09-27 NOTE — CHART NOTE - NSCHARTNOTEFT_GEN_A_CORE
Psychiatry Chart Note:  Chart reviewed, went to evaluate the patient, pt. calm/polite however he declined psychiatry consult.  Denies SI and HI.   Discussed with Dr. Mcconnell, she denies any acute concerns.   Please call [facility] psychiatry if patient agrees to speak with psychiatry or if there is any acute concerns.

## 2022-09-27 NOTE — PHYSICAL THERAPY INITIAL EVALUATION ADULT - PERTINENT HX OF CURRENT PROBLEM, REHAB EVAL
Patient is 42 year old male PMHx ETOH use disorder presenting with tremors and requesting detox, admitted for ETOH withdrawal

## 2022-09-28 LAB
ALBUMIN SERPL ELPH-MCNC: 4.1 G/DL — SIGNIFICANT CHANGE UP (ref 3.3–5)
ALP SERPL-CCNC: 96 U/L — SIGNIFICANT CHANGE UP (ref 40–120)
ALT FLD-CCNC: 112 U/L — HIGH (ref 4–41)
ANION GAP SERPL CALC-SCNC: 13 MMOL/L — SIGNIFICANT CHANGE UP (ref 7–14)
AST SERPL-CCNC: 86 U/L — HIGH (ref 4–40)
BILIRUB SERPL-MCNC: 0.9 MG/DL — SIGNIFICANT CHANGE UP (ref 0.2–1.2)
BUN SERPL-MCNC: 12 MG/DL — SIGNIFICANT CHANGE UP (ref 7–23)
CALCIUM SERPL-MCNC: 9.7 MG/DL — SIGNIFICANT CHANGE UP (ref 8.4–10.5)
CHLORIDE SERPL-SCNC: 99 MMOL/L — SIGNIFICANT CHANGE UP (ref 98–107)
CO2 SERPL-SCNC: 26 MMOL/L — SIGNIFICANT CHANGE UP (ref 22–31)
CREAT SERPL-MCNC: 0.72 MG/DL — SIGNIFICANT CHANGE UP (ref 0.5–1.3)
EGFR: 116 ML/MIN/1.73M2 — SIGNIFICANT CHANGE UP
GLUCOSE SERPL-MCNC: 95 MG/DL — SIGNIFICANT CHANGE UP (ref 70–99)
HCT VFR BLD CALC: 34.8 % — LOW (ref 39–50)
HGB BLD-MCNC: 12.1 G/DL — LOW (ref 13–17)
MAGNESIUM SERPL-MCNC: 1.6 MG/DL — SIGNIFICANT CHANGE UP (ref 1.6–2.6)
MCHC RBC-ENTMCNC: 34.8 GM/DL — SIGNIFICANT CHANGE UP (ref 32–36)
MCHC RBC-ENTMCNC: 35.6 PG — HIGH (ref 27–34)
MCV RBC AUTO: 102.4 FL — HIGH (ref 80–100)
NRBC # BLD: 0 /100 WBCS — SIGNIFICANT CHANGE UP (ref 0–0)
NRBC # FLD: 0 K/UL — SIGNIFICANT CHANGE UP (ref 0–0)
PHOSPHATE SERPL-MCNC: 4.4 MG/DL — SIGNIFICANT CHANGE UP (ref 2.5–4.5)
PLATELET # BLD AUTO: 82 K/UL — LOW (ref 150–400)
POTASSIUM SERPL-MCNC: 3.9 MMOL/L — SIGNIFICANT CHANGE UP (ref 3.5–5.3)
POTASSIUM SERPL-SCNC: 3.9 MMOL/L — SIGNIFICANT CHANGE UP (ref 3.5–5.3)
PROT SERPL-MCNC: 7.2 G/DL — SIGNIFICANT CHANGE UP (ref 6–8.3)
RBC # BLD: 3.4 M/UL — LOW (ref 4.2–5.8)
RBC # FLD: 13 % — SIGNIFICANT CHANGE UP (ref 10.3–14.5)
SODIUM SERPL-SCNC: 138 MMOL/L — SIGNIFICANT CHANGE UP (ref 135–145)
WBC # BLD: 3.03 K/UL — LOW (ref 3.8–10.5)
WBC # FLD AUTO: 3.03 K/UL — LOW (ref 3.8–10.5)

## 2022-09-28 PROCEDURE — 99233 SBSQ HOSP IP/OBS HIGH 50: CPT

## 2022-09-28 RX ORDER — MAGNESIUM SULFATE 500 MG/ML
2 VIAL (ML) INJECTION ONCE
Refills: 0 | Status: COMPLETED | OUTPATIENT
Start: 2022-09-28 | End: 2022-09-28

## 2022-09-28 RX ADMIN — MAGNESIUM OXIDE 400 MG ORAL TABLET 400 MILLIGRAM(S): 241.3 TABLET ORAL at 08:24

## 2022-09-28 RX ADMIN — Medication 1.5 MILLIGRAM(S): at 16:38

## 2022-09-28 RX ADMIN — Medication 1 MILLIGRAM(S): at 11:19

## 2022-09-28 RX ADMIN — Medication 1.5 MILLIGRAM(S): at 11:19

## 2022-09-28 RX ADMIN — Medication 1.5 MILLIGRAM(S): at 03:02

## 2022-09-28 RX ADMIN — Medication 1.5 MILLIGRAM(S): at 19:46

## 2022-09-28 RX ADMIN — Medication 1.5 MILLIGRAM(S): at 06:54

## 2022-09-28 RX ADMIN — Medication 1.5 MILLIGRAM(S): at 23:43

## 2022-09-28 RX ADMIN — Medication 25 MILLIGRAM(S): at 06:54

## 2022-09-28 RX ADMIN — Medication 100 MILLIGRAM(S): at 11:19

## 2022-09-28 RX ADMIN — Medication 1 TABLET(S): at 11:19

## 2022-09-28 RX ADMIN — Medication 25 GRAM(S): at 08:24

## 2022-09-29 LAB
ALBUMIN SERPL ELPH-MCNC: 4.2 G/DL — SIGNIFICANT CHANGE UP (ref 3.3–5)
ALP SERPL-CCNC: 82 U/L — SIGNIFICANT CHANGE UP (ref 40–120)
ALT FLD-CCNC: 127 U/L — HIGH (ref 4–41)
ANION GAP SERPL CALC-SCNC: 12 MMOL/L — SIGNIFICANT CHANGE UP (ref 7–14)
APTT BLD: 25.9 SEC — LOW (ref 27–36.3)
AST SERPL-CCNC: 106 U/L — HIGH (ref 4–40)
BASOPHILS # BLD AUTO: 0.03 K/UL — SIGNIFICANT CHANGE UP (ref 0–0.2)
BASOPHILS NFR BLD AUTO: 1 % — SIGNIFICANT CHANGE UP (ref 0–2)
BILIRUB SERPL-MCNC: 0.8 MG/DL — SIGNIFICANT CHANGE UP (ref 0.2–1.2)
BUN SERPL-MCNC: 9 MG/DL — SIGNIFICANT CHANGE UP (ref 7–23)
CALCIUM SERPL-MCNC: 9.6 MG/DL — SIGNIFICANT CHANGE UP (ref 8.4–10.5)
CHLORIDE SERPL-SCNC: 99 MMOL/L — SIGNIFICANT CHANGE UP (ref 98–107)
CO2 SERPL-SCNC: 25 MMOL/L — SIGNIFICANT CHANGE UP (ref 22–31)
CREAT SERPL-MCNC: 0.69 MG/DL — SIGNIFICANT CHANGE UP (ref 0.5–1.3)
D DIMER BLD IA.RAPID-MCNC: 466 NG/ML DDU — HIGH
EGFR: 117 ML/MIN/1.73M2 — SIGNIFICANT CHANGE UP
EOSINOPHIL # BLD AUTO: 0.05 K/UL — SIGNIFICANT CHANGE UP (ref 0–0.5)
EOSINOPHIL NFR BLD AUTO: 1.7 % — SIGNIFICANT CHANGE UP (ref 0–6)
GLUCOSE SERPL-MCNC: 97 MG/DL — SIGNIFICANT CHANGE UP (ref 70–99)
HCT VFR BLD CALC: 34 % — LOW (ref 39–50)
HGB BLD-MCNC: 11.9 G/DL — LOW (ref 13–17)
IANC: 1.75 K/UL — LOW (ref 1.8–7.4)
IMM GRANULOCYTES NFR BLD AUTO: 0.7 % — SIGNIFICANT CHANGE UP (ref 0–0.9)
INR BLD: 0.96 RATIO — SIGNIFICANT CHANGE UP (ref 0.88–1.16)
LYMPHOCYTES # BLD AUTO: 0.68 K/UL — LOW (ref 1–3.3)
LYMPHOCYTES # BLD AUTO: 22.6 % — SIGNIFICANT CHANGE UP (ref 13–44)
MAGNESIUM SERPL-MCNC: 1.9 MG/DL — SIGNIFICANT CHANGE UP (ref 1.6–2.6)
MCHC RBC-ENTMCNC: 35 GM/DL — SIGNIFICANT CHANGE UP (ref 32–36)
MCHC RBC-ENTMCNC: 36.1 PG — HIGH (ref 27–34)
MCV RBC AUTO: 103 FL — HIGH (ref 80–100)
MONOCYTES # BLD AUTO: 0.48 K/UL — SIGNIFICANT CHANGE UP (ref 0–0.9)
MONOCYTES NFR BLD AUTO: 15.9 % — HIGH (ref 2–14)
NEUTROPHILS # BLD AUTO: 1.75 K/UL — LOW (ref 1.8–7.4)
NEUTROPHILS NFR BLD AUTO: 58.1 % — SIGNIFICANT CHANGE UP (ref 43–77)
NRBC # BLD: 0 /100 WBCS — SIGNIFICANT CHANGE UP (ref 0–0)
NRBC # FLD: 0 K/UL — SIGNIFICANT CHANGE UP (ref 0–0)
NT-PROBNP SERPL-SCNC: 29 PG/ML — SIGNIFICANT CHANGE UP
PHOSPHATE SERPL-MCNC: 3.8 MG/DL — SIGNIFICANT CHANGE UP (ref 2.5–4.5)
PLATELET # BLD AUTO: 125 K/UL — LOW (ref 150–400)
POTASSIUM SERPL-MCNC: 4 MMOL/L — SIGNIFICANT CHANGE UP (ref 3.5–5.3)
POTASSIUM SERPL-SCNC: 4 MMOL/L — SIGNIFICANT CHANGE UP (ref 3.5–5.3)
PROT SERPL-MCNC: 6.9 G/DL — SIGNIFICANT CHANGE UP (ref 6–8.3)
PROTHROM AB SERPL-ACNC: 11.1 SEC — SIGNIFICANT CHANGE UP (ref 10.5–13.4)
RBC # BLD: 3.3 M/UL — LOW (ref 4.2–5.8)
RBC # FLD: 13.3 % — SIGNIFICANT CHANGE UP (ref 10.3–14.5)
SODIUM SERPL-SCNC: 136 MMOL/L — SIGNIFICANT CHANGE UP (ref 135–145)
TROPONIN T, HIGH SENSITIVITY RESULT: <6 NG/L — SIGNIFICANT CHANGE UP
TSH SERPL-MCNC: 1.81 UIU/ML — SIGNIFICANT CHANGE UP (ref 0.27–4.2)
WBC # BLD: 3.01 K/UL — LOW (ref 3.8–10.5)
WBC # FLD AUTO: 3.01 K/UL — LOW (ref 3.8–10.5)

## 2022-09-29 PROCEDURE — 99233 SBSQ HOSP IP/OBS HIGH 50: CPT

## 2022-09-29 PROCEDURE — 71275 CT ANGIOGRAPHY CHEST: CPT | Mod: 26

## 2022-09-29 RX ADMIN — Medication 100 MILLIGRAM(S): at 10:34

## 2022-09-29 RX ADMIN — Medication 1 MILLIGRAM(S): at 14:44

## 2022-09-29 RX ADMIN — Medication 1 MILLIGRAM(S): at 18:46

## 2022-09-29 RX ADMIN — Medication 1 MILLIGRAM(S): at 23:20

## 2022-09-29 RX ADMIN — Medication 1 TABLET(S): at 10:34

## 2022-09-29 RX ADMIN — Medication 1 MILLIGRAM(S): at 10:34

## 2022-09-29 RX ADMIN — Medication 1 MILLIGRAM(S): at 06:38

## 2022-09-29 RX ADMIN — Medication 1 MILLIGRAM(S): at 03:11

## 2022-09-29 RX ADMIN — Medication 25 MILLIGRAM(S): at 06:38

## 2022-09-29 NOTE — CHART NOTE - NSCHARTNOTEFT_GEN_A_CORE
Notified by RN that patients HR was 180. Pt was in the bathroom at the time having a large bowel movement. Once escorted back to bed, HR improved however still sustaining 110-120s. Pt appears more tremulous on exam. Denies hallucinations. Ativan given. Tachycardia likely 2/2 withdrawal --> will c/w Ativan taper however if tachycardia persists despite withdrawal treatment, will need to r/o PE. Will continue to monitor. Discussed with attending Dr. Henderson. Notified by RN that patients HR was 180. Pt was in the bathroom at the time having a large bowel movement. Once escorted back to bed, HR improved however still sustaining 110-120s. Pt appears more tremulous on exam. Denies hallucinations. Ativan given. Tachycardia likely 2/2 withdrawal --> will c/w Ativan taper however if tachycardia persists despite withdrawal treatment, will need to r/o PE. Will continue to monitor. Discussed with attending Dr. Henderson.    Addendum: Pt was still tachy therefore Ddimer ordered and came back elevated 466. CTA chest r/o PE ordered. Cardiac enzymes, coags, and BNP ordered. If CTA positive for PE, will start heparin drip. Discussed with Dr. Henderson. Pt aware and in agreement.

## 2022-09-29 NOTE — PROVIDER CONTACT NOTE (OTHER) - ASSESSMENT
Patient A & o x4 and no acute distress noted. Patient denies dizziness, SOB, palpitation or chest pain
patient ciwa score increased to 8. patient endorses headache and anxiety. RN adminstered standing dose of ativan 3mg p.o.

## 2022-09-30 LAB
ALBUMIN SERPL ELPH-MCNC: 3.9 G/DL — SIGNIFICANT CHANGE UP (ref 3.3–5)
ALP SERPL-CCNC: 75 U/L — SIGNIFICANT CHANGE UP (ref 40–120)
ALT FLD-CCNC: 104 U/L — HIGH (ref 4–41)
ANION GAP SERPL CALC-SCNC: 11 MMOL/L — SIGNIFICANT CHANGE UP (ref 7–14)
AST SERPL-CCNC: 72 U/L — HIGH (ref 4–40)
BILIRUB SERPL-MCNC: 0.7 MG/DL — SIGNIFICANT CHANGE UP (ref 0.2–1.2)
BUN SERPL-MCNC: 9 MG/DL — SIGNIFICANT CHANGE UP (ref 7–23)
CALCIUM SERPL-MCNC: 9 MG/DL — SIGNIFICANT CHANGE UP (ref 8.4–10.5)
CHLORIDE SERPL-SCNC: 94 MMOL/L — LOW (ref 98–107)
CO2 SERPL-SCNC: 22 MMOL/L — SIGNIFICANT CHANGE UP (ref 22–31)
CREAT SERPL-MCNC: 0.85 MG/DL — SIGNIFICANT CHANGE UP (ref 0.5–1.3)
EGFR: 110 ML/MIN/1.73M2 — SIGNIFICANT CHANGE UP
GLUCOSE SERPL-MCNC: 95 MG/DL — SIGNIFICANT CHANGE UP (ref 70–99)
HCT VFR BLD CALC: 31.6 % — LOW (ref 39–50)
HGB BLD-MCNC: 11 G/DL — LOW (ref 13–17)
MAGNESIUM SERPL-MCNC: 1.6 MG/DL — SIGNIFICANT CHANGE UP (ref 1.6–2.6)
MCHC RBC-ENTMCNC: 34.8 GM/DL — SIGNIFICANT CHANGE UP (ref 32–36)
MCHC RBC-ENTMCNC: 35.3 PG — HIGH (ref 27–34)
MCV RBC AUTO: 101.3 FL — HIGH (ref 80–100)
NRBC # BLD: 0 /100 WBCS — SIGNIFICANT CHANGE UP (ref 0–0)
NRBC # FLD: 0 K/UL — SIGNIFICANT CHANGE UP (ref 0–0)
PHOSPHATE SERPL-MCNC: 3.1 MG/DL — SIGNIFICANT CHANGE UP (ref 2.5–4.5)
PLATELET # BLD AUTO: 99 K/UL — LOW (ref 150–400)
POTASSIUM SERPL-MCNC: 3.9 MMOL/L — SIGNIFICANT CHANGE UP (ref 3.5–5.3)
POTASSIUM SERPL-SCNC: 3.9 MMOL/L — SIGNIFICANT CHANGE UP (ref 3.5–5.3)
PROT SERPL-MCNC: 6.7 G/DL — SIGNIFICANT CHANGE UP (ref 6–8.3)
RBC # BLD: 3.12 M/UL — LOW (ref 4.2–5.8)
RBC # FLD: 13.6 % — SIGNIFICANT CHANGE UP (ref 10.3–14.5)
SODIUM SERPL-SCNC: 127 MMOL/L — LOW (ref 135–145)
TROPONIN T, HIGH SENSITIVITY RESULT: 9 NG/L — SIGNIFICANT CHANGE UP
WBC # BLD: 2.8 K/UL — LOW (ref 3.8–10.5)
WBC # FLD AUTO: 2.8 K/UL — LOW (ref 3.8–10.5)

## 2022-09-30 PROCEDURE — 99232 SBSQ HOSP IP/OBS MODERATE 35: CPT

## 2022-09-30 RX ORDER — SODIUM CHLORIDE 9 MG/ML
1000 INJECTION INTRAMUSCULAR; INTRAVENOUS; SUBCUTANEOUS
Refills: 0 | Status: DISCONTINUED | OUTPATIENT
Start: 2022-09-30 | End: 2022-10-01

## 2022-09-30 RX ADMIN — Medication 0.5 MILLIGRAM(S): at 22:01

## 2022-09-30 RX ADMIN — Medication 0.5 MILLIGRAM(S): at 11:40

## 2022-09-30 RX ADMIN — Medication 0.5 MILLIGRAM(S): at 18:49

## 2022-09-30 RX ADMIN — Medication 1 TABLET(S): at 11:41

## 2022-09-30 RX ADMIN — Medication 0.5 MILLIGRAM(S): at 15:24

## 2022-09-30 RX ADMIN — Medication 1 MILLIGRAM(S): at 11:41

## 2022-09-30 RX ADMIN — Medication 0.5 MILLIGRAM(S): at 03:02

## 2022-09-30 RX ADMIN — SODIUM CHLORIDE 75 MILLILITER(S): 9 INJECTION INTRAMUSCULAR; INTRAVENOUS; SUBCUTANEOUS at 13:36

## 2022-09-30 RX ADMIN — Medication 0.5 MILLIGRAM(S): at 07:03

## 2022-09-30 NOTE — DIETITIAN INITIAL EVALUATION ADULT - ORAL INTAKE PTA/DIET HISTORY
Pt lives at home with his wife. They cook together at home. No difficulty obtaining groceries. No specific diet followed PTA. Was taking Multivitamin and omega 3 PTA

## 2022-09-30 NOTE — DIETITIAN INITIAL EVALUATION ADULT - OTHER INFO
Per chart, 43 y/o male with hx of EtOH use, anxiety admitted for EtOH withdrawal.     Nutrition interview: No recent episodes of nausea, vomiting, diarrhea or constipation, BM noted yesterday per RN flowsheets. Denies any chewing/swallowing difficulties. No food allergies. Stated UBW: ~180# Pt believes he was last 180# in May, however unsure of exact timeframe. Pt weight today 164.1# taken today via bedscale by RD upon visit. Pt believes wt loss attributed to increased alcohol consumption and decreased PO intake/appetite at that time. Pt with wt loss x 4 months (-8.8%). Pt stated height @ 5'11". Food preferences explored and noted. Intake is % per RN flowsheets and per pt. Feeding skills: independent. Intake has been increased since admission. Pt also receives food from outside brought in by family. Pt also receiving Ensure Enlive 2x daily (700 gerson and 40 gm protein)... wants to decrease to once daily as Pt states he does not drink both shakes.     Pt declined nutrition education at this time.

## 2022-09-30 NOTE — DIETITIAN INITIAL EVALUATION ADULT - NS FNS DIET ORDER
Diet, Regular:   Supplement Feeding Modality:  Oral  Ensure Enlive Cans or Servings Per Day:  1       Frequency:  Two Times a day (09-26-22 @ 15:23) [Active]

## 2022-09-30 NOTE — DIETITIAN INITIAL EVALUATION ADULT - ADD RECOMMEND
1) Recommend decreased Enlive to 1x/day (350 gerson and 20 gm protein) per Pt request  2) Continue on current diet rx: Regular  3) Continue to Monitor weights, labs, BM's, skin integrity, p.o. intake.  4) RD to f/u prn

## 2022-09-30 NOTE — DIETITIAN INITIAL EVALUATION ADULT - PERTINENT MEDS FT
MEDICATIONS  (STANDING):  folic acid 1 milliGRAM(s) Oral daily  influenza   Vaccine 0.5 milliLiter(s) IntraMuscular once  LORazepam     Tablet   Oral   LORazepam     Tablet 0.5 milliGRAM(s) Oral every 4 hours  metoprolol succinate ER 25 milliGRAM(s) Oral daily  multivitamin 1 Tablet(s) Oral daily  sodium chloride 0.9%. 1000 milliLiter(s) (75 mL/Hr) IV Continuous <Continuous>    MEDICATIONS  (PRN):  LORazepam     Tablet 2 milliGRAM(s) Oral every 2 hours PRN Symptom-triggered 2 point increase in CIWA-Ar  LORazepam     Tablet 2 milliGRAM(s) Oral every 1 hour PRN Symptom-triggered: each CIWA -Ar score 8 or GREATER

## 2022-09-30 NOTE — SBIRT NOTE ADULT - NSSBIRTALCPASSREFTXDET_GEN_A_CORE
SW consulted patient in regards to his drinking. Patient had been  sober for approx. 7 years and only recently started drinking again 3-4 weeks). SW discussed different treatment options with patient. Patient stated he has support and resources but open to further outpatient resources. SW provided a list.

## 2022-10-01 ENCOUNTER — TRANSCRIPTION ENCOUNTER (OUTPATIENT)
Age: 42
End: 2022-10-01

## 2022-10-01 LAB
ALBUMIN SERPL ELPH-MCNC: 4 G/DL — SIGNIFICANT CHANGE UP (ref 3.3–5)
ALP SERPL-CCNC: 65 U/L — SIGNIFICANT CHANGE UP (ref 40–120)
ALT FLD-CCNC: 96 U/L — HIGH (ref 4–41)
ANION GAP SERPL CALC-SCNC: 14 MMOL/L — SIGNIFICANT CHANGE UP (ref 7–14)
AST SERPL-CCNC: 81 U/L — HIGH (ref 4–40)
BILIRUB SERPL-MCNC: 0.5 MG/DL — SIGNIFICANT CHANGE UP (ref 0.2–1.2)
BUN SERPL-MCNC: 9 MG/DL — SIGNIFICANT CHANGE UP (ref 7–23)
CALCIUM SERPL-MCNC: 8.9 MG/DL — SIGNIFICANT CHANGE UP (ref 8.4–10.5)
CHLORIDE SERPL-SCNC: 99 MMOL/L — SIGNIFICANT CHANGE UP (ref 98–107)
CO2 SERPL-SCNC: 20 MMOL/L — LOW (ref 22–31)
CREAT SERPL-MCNC: 0.77 MG/DL — SIGNIFICANT CHANGE UP (ref 0.5–1.3)
EGFR: 113 ML/MIN/1.73M2 — SIGNIFICANT CHANGE UP
GLUCOSE SERPL-MCNC: 106 MG/DL — HIGH (ref 70–99)
HCT VFR BLD CALC: 32.9 % — LOW (ref 39–50)
HGB BLD-MCNC: 11.1 G/DL — LOW (ref 13–17)
MAGNESIUM SERPL-MCNC: 1.9 MG/DL — SIGNIFICANT CHANGE UP (ref 1.6–2.6)
MCHC RBC-ENTMCNC: 33.7 GM/DL — SIGNIFICANT CHANGE UP (ref 32–36)
MCHC RBC-ENTMCNC: 35 PG — HIGH (ref 27–34)
MCV RBC AUTO: 103.8 FL — HIGH (ref 80–100)
NRBC # BLD: 0 /100 WBCS — SIGNIFICANT CHANGE UP (ref 0–0)
NRBC # FLD: 0 K/UL — SIGNIFICANT CHANGE UP (ref 0–0)
PHOSPHATE SERPL-MCNC: 3.9 MG/DL — SIGNIFICANT CHANGE UP (ref 2.5–4.5)
PLATELET # BLD AUTO: 128 K/UL — LOW (ref 150–400)
POTASSIUM SERPL-MCNC: 4.4 MMOL/L — SIGNIFICANT CHANGE UP (ref 3.5–5.3)
POTASSIUM SERPL-SCNC: 4.4 MMOL/L — SIGNIFICANT CHANGE UP (ref 3.5–5.3)
PROT SERPL-MCNC: 6.9 G/DL — SIGNIFICANT CHANGE UP (ref 6–8.3)
RBC # BLD: 3.17 M/UL — LOW (ref 4.2–5.8)
RBC # FLD: 13.1 % — SIGNIFICANT CHANGE UP (ref 10.3–14.5)
SARS-COV-2 RNA SPEC QL NAA+PROBE: SIGNIFICANT CHANGE UP
SODIUM SERPL-SCNC: 133 MMOL/L — LOW (ref 135–145)
WBC # BLD: 2.57 K/UL — LOW (ref 3.8–10.5)
WBC # FLD AUTO: 2.57 K/UL — LOW (ref 3.8–10.5)

## 2022-10-01 PROCEDURE — 99232 SBSQ HOSP IP/OBS MODERATE 35: CPT

## 2022-10-01 RX ORDER — SODIUM CHLORIDE 9 MG/ML
1000 INJECTION INTRAMUSCULAR; INTRAVENOUS; SUBCUTANEOUS
Refills: 0 | Status: DISCONTINUED | OUTPATIENT
Start: 2022-10-01 | End: 2022-10-03

## 2022-10-01 RX ADMIN — Medication 1 TABLET(S): at 11:52

## 2022-10-01 RX ADMIN — Medication 25 MILLIGRAM(S): at 05:08

## 2022-10-01 RX ADMIN — Medication 0.5 MILLIGRAM(S): at 11:52

## 2022-10-01 RX ADMIN — SODIUM CHLORIDE 75 MILLILITER(S): 9 INJECTION INTRAMUSCULAR; INTRAVENOUS; SUBCUTANEOUS at 11:22

## 2022-10-01 RX ADMIN — Medication 1 MILLIGRAM(S): at 11:52

## 2022-10-01 RX ADMIN — SODIUM CHLORIDE 75 MILLILITER(S): 9 INJECTION INTRAMUSCULAR; INTRAVENOUS; SUBCUTANEOUS at 19:52

## 2022-10-01 RX ADMIN — Medication 0.5 MILLIGRAM(S): at 23:19

## 2022-10-01 NOTE — DISCHARGE NOTE PROVIDER - NSDCCPCAREPLAN_GEN_ALL_CORE_FT
PRINCIPAL DISCHARGE DIAGNOSIS  Diagnosis: Alcohol dependence with withdrawal  Assessment and Plan of Treatment: you were admitted with alcohol withdrawl and you had completed ativan taper. please followup with primary care provider in 1-2 weeks      SECONDARY DISCHARGE DIAGNOSES  Diagnosis: Sinus tachycardia  Assessment and Plan of Treatment: you intermittent fast heart rate due to withdrawl, you had CT chest which does not show any blood clot but noted incidental finding of groundglass in the right middle lobe of lung. please repeat CT chest in 3 months    Diagnosis: Transaminitis  Assessment and Plan of Treatment: you liver enzyme were elevated now stable possible due to alcohol use. please followup with your primary care provider for continue monitor    Diagnosis: Hyponatremia  Assessment and Plan of Treatment: you sodium level was low on admission due to decrease oral intake now improved after IV fluid

## 2022-10-01 NOTE — DISCHARGE NOTE PROVIDER - NSDCMRMEDTOKEN_GEN_ALL_CORE_FT
Metoprolol Succinate ER 25 mg oral tablet, extended release: 1 tab(s) orally once a day   folic acid 1 mg oral tablet: 1 tab(s) orally once a day  Metoprolol Succinate ER 25 mg oral tablet, extended release: 1 tab(s) orally once a day  Multiple Vitamins oral tablet: 1 tab(s) orally once a day

## 2022-10-02 LAB
HCT VFR BLD CALC: 32.7 % — LOW (ref 39–50)
HGB BLD-MCNC: 10.9 G/DL — LOW (ref 13–17)
MCHC RBC-ENTMCNC: 33.3 GM/DL — SIGNIFICANT CHANGE UP (ref 32–36)
MCHC RBC-ENTMCNC: 35.3 PG — HIGH (ref 27–34)
MCV RBC AUTO: 105.8 FL — HIGH (ref 80–100)
NRBC # BLD: 0 /100 WBCS — SIGNIFICANT CHANGE UP (ref 0–0)
NRBC # FLD: 0 K/UL — SIGNIFICANT CHANGE UP (ref 0–0)
PLATELET # BLD AUTO: 130 K/UL — LOW (ref 150–400)
RBC # BLD: 3.09 M/UL — LOW (ref 4.2–5.8)
RBC # FLD: 13.5 % — SIGNIFICANT CHANGE UP (ref 10.3–14.5)
WBC # BLD: 2.53 K/UL — LOW (ref 3.8–10.5)
WBC # FLD AUTO: 2.53 K/UL — LOW (ref 3.8–10.5)

## 2022-10-02 PROCEDURE — 99232 SBSQ HOSP IP/OBS MODERATE 35: CPT

## 2022-10-02 RX ADMIN — Medication 0.5 MILLIGRAM(S): at 22:01

## 2022-10-02 RX ADMIN — Medication 1 MILLIGRAM(S): at 11:56

## 2022-10-02 RX ADMIN — Medication 1 TABLET(S): at 11:56

## 2022-10-03 ENCOUNTER — TRANSCRIPTION ENCOUNTER (OUTPATIENT)
Age: 42
End: 2022-10-03

## 2022-10-03 VITALS
RESPIRATION RATE: 17 BRPM | HEART RATE: 87 BPM | SYSTOLIC BLOOD PRESSURE: 109 MMHG | TEMPERATURE: 98 F | OXYGEN SATURATION: 99 % | DIASTOLIC BLOOD PRESSURE: 71 MMHG

## 2022-10-03 PROCEDURE — 99239 HOSP IP/OBS DSCHRG MGMT >30: CPT

## 2022-10-03 RX ORDER — FOLIC ACID 0.8 MG
1 TABLET ORAL
Qty: 30 | Refills: 0
Start: 2022-10-03 | End: 2022-11-01

## 2022-10-03 RX ADMIN — Medication 25 MILLIGRAM(S): at 05:29

## 2022-10-03 RX ADMIN — Medication 1 MILLIGRAM(S): at 11:44

## 2022-10-03 RX ADMIN — Medication 1 TABLET(S): at 11:45

## 2022-10-03 NOTE — PROGRESS NOTE ADULT - PROBLEM SELECTOR PLAN 5
Patient on Toprol 25mg daily   - unclear indication  - continue for now

## 2022-10-03 NOTE — DISCHARGE NOTE NURSING/CASE MANAGEMENT/SOCIAL WORK - PATIENT PORTAL LINK FT
You can access the FollowMyHealth Patient Portal offered by [facility] by registering at the following website: [email or web address]. By joining [employer]’s FollowMyHealth portal, you will also be able to view your health information using other applications (apps) compatible with our system.

## 2022-10-03 NOTE — PROGRESS NOTE ADULT - PROBLEM SELECTOR PROBLEM 1
Alcohol dependence with withdrawal

## 2022-10-03 NOTE — PROGRESS NOTE ADULT - PROBLEM SELECTOR PLAN 4
Used to be on Klonopin  - currently on Ativan for EtOH withdrawal  - does not want to try SSRIs  - refusing to speak with psychiatry
Used to be on Klonopin  - currently on Ativan for EtOH withdrawal  - does not want to try SSRIs  - psychiatry eval requested
Used to be on Klonopin  - currently on Ativan for EtOH withdrawal  - does not want to try SSRIs  - refusing to speak with psychiatry
Used to be on Klonopin  - currently on Ativan for EtOH withdrawal  - does not want to try SSRIs  - refusing to speak with psychiatry

## 2022-10-03 NOTE — PROGRESS NOTE ADULT - SUBJECTIVE AND OBJECTIVE BOX
Janice Mcconnell MD  [facility] Division of Hospital Medicine  Pager [phone] (M-F 8AM-5PM)  Other Times: [phone]    Patient is a 42y old  Male who presents with a chief complaint of ETOH withdrawal (25 Sep 2022 10:25)    SUBJECTIVE / OVERNIGHT EVENTS: reports some tremors, denies visual/auditory hallucinations    MEDICATIONS  (STANDING):  folic acid 1 milliGRAM(s) Oral daily  LORazepam     Tablet 3 milliGRAM(s) Oral every 4 hours  LORazepam     Tablet   Oral   metoprolol succinate ER 25 milliGRAM(s) Oral daily  multivitamin 1 Tablet(s) Oral daily  ondansetron Injectable 4 milliGRAM(s) IV Push every 8 hours  sodium chloride 0.9%. 1000 milliLiter(s) (75 mL/Hr) IV Continuous <Continuous>  thiamine 100 milliGRAM(s) Oral daily    MEDICATIONS  (PRN):      PHYSICAL EXAM:  Vital Signs Last 24 Hrs  T(C): 36.3 (26 Sep 2022 14:30), Max: 37.2 (26 Sep 2022 01:00)  T(F): 97.3 (26 Sep 2022 14:30), Max: 98.9 (26 Sep 2022 01:00)  HR: 108 (26 Sep 2022 14:30) (103 - 134)  BP: 121/87 (26 Sep 2022 14:30) (104/78 - 143/96)  BP(mean): --  RR: 19 (26 Sep 2022 14:30) (13 - 19)  SpO2: 99% (26 Sep 2022 14:30) (96% - 100%)    Parameters below as of 26 Sep 2022 14:30  Patient On (Oxygen Delivery Method): room air        CONSTITUTIONAL: NAD, well-developed, well-groomed  RESPIRATORY: Normal respiratory effort; lungs are clear to auscultation bilaterally  CARDIOVASCULAR: Regular rate and rhythm, normal S1 and S2, no murmur/rub/gallop; No lower extremity edema  GASTROINTESTINAL: Nontender to palpation, normoactive bowel sounds, no rebound/guarding; No hepatosplenomegaly  MUSCULOSKELETAL:  no clubbing or cyanosis of digits; no joint swelling or tenderness to palpation  NEUROLOGY: non-focal; no gross sensory deficits   PSYCH: A+O to person, place, and time; affect appropriate  SKIN: No rashes; warm     LABS:                        13.0   2.88  )-----------( 28       ( 26 Sep 2022 09:23 )             37.5     09-26    128<L>  |  90<L>  |  7   ----------------------------<  132<H>  3.8   |  23  |  0.68    Ca    9.5      26 Sep 2022 09:23  Phos  2.9     09-26  Mg     1.60     09-26    TPro  7.2  /  Alb  4.1  /  TBili  1.6<H>  /  DBili  x   /  AST  213<H>  /  ALT  168<H>  /  AlkPhos  100  09-26                RADIOLOGY & ADDITIONAL TESTS:  Results Reviewed:   Imaging Personally Reviewed:  Electrocardiogram Personally Reviewed:    COORDINATION OF CARE:  Care Discussed with Consultants/Other Providers [Y/N]:  Prior or Outpatient Records Reviewed [Y/N]:  
Janice Mcconnell MD  [facility] Division of Hospital Medicine  Pager [phone] (M-F 8AM-5PM)  Other Times: [phone]    Patient is a 42y old  Male who presents with a chief complaint of Alcohol dependence with withdrawal     (30 Sep 2022 14:01)      SUBJECTIVE / OVERNIGHT EVENTS:    MEDICATIONS  (STANDING):  folic acid 1 milliGRAM(s) Oral daily  influenza   Vaccine 0.5 milliLiter(s) IntraMuscular once  LORazepam     Tablet   Oral   LORazepam     Tablet 0.5 milliGRAM(s) Oral every 4 hours  metoprolol succinate ER 25 milliGRAM(s) Oral daily  multivitamin 1 Tablet(s) Oral daily  sodium chloride 0.9%. 1000 milliLiter(s) (75 mL/Hr) IV Continuous <Continuous>    MEDICATIONS  (PRN):  LORazepam     Tablet 2 milliGRAM(s) Oral every 2 hours PRN Symptom-triggered 2 point increase in CIWA-Ar  LORazepam     Tablet 2 milliGRAM(s) Oral every 1 hour PRN Symptom-triggered: each CIWA -Ar score 8 or GREATER      PHYSICAL EXAM:  Vital Signs Last 24 Hrs  T(C): 37.2 (30 Sep 2022 14:30), Max: 37.2 (30 Sep 2022 12:54)  T(F): 99 (30 Sep 2022 14:30), Max: 99 (30 Sep 2022 14:30)  HR: 100 (30 Sep 2022 14:30) (95 - 116)  BP: 129/78 (30 Sep 2022 14:30) (108/76 - 136/89)  BP(mean): --  RR: 18 (30 Sep 2022 14:30) (16 - 18)  SpO2: 100% (30 Sep 2022 14:30) (99% - 100%)    Parameters below as of 30 Sep 2022 14:30  Patient On (Oxygen Delivery Method): room air        CONSTITUTIONAL: NAD, well-developed, well-groomed  RESPIRATORY: Normal respiratory effort; lungs are clear to auscultation bilaterally  CARDIOVASCULAR: Regular rate and rhythm, normal S1 and S2, no murmur/rub/gallop; No lower extremity edema  GASTROINTESTINAL: Nontender to palpation, normoactive bowel sounds, no rebound/guarding; No hepatosplenomegaly  MUSCULOSKELETAL:  no clubbing or cyanosis of digits; no joint swelling or tenderness to palpation  NEUROLOGY: non-focal; no gross sensory deficits   PSYCH: A+O to person, place, and time; affect appropriate  SKIN: No rashes; warm     LABS:                        11.0   2.80  )-----------( 99       ( 30 Sep 2022 06:17 )             31.6     09-30    127<L>  |  94<L>  |  9   ----------------------------<  95  3.9   |  22  |  0.85    Ca    9.0      30 Sep 2022 06:17  Phos  3.1     09-30  Mg     1.60     09-30    TPro  6.7  /  Alb  3.9  /  TBili  0.7  /  DBili  x   /  AST  72<H>  /  ALT  104<H>  /  AlkPhos  75  09-30    PT/INR - ( 29 Sep 2022 13:40 )   PT: 11.1 sec;   INR: 0.96 ratio         PTT - ( 29 Sep 2022 13:40 )  PTT:25.9 sec            RADIOLOGY & ADDITIONAL TESTS:  Results Reviewed:   Imaging Personally Reviewed:  Electrocardiogram Personally Reviewed:    COORDINATION OF CARE:  Care Discussed with Consultants/Other Providers [Y/N]:  Prior or Outpatient Records Reviewed [Y/N]:  
Dr. Julia RIVERA [facility] Hospital Medicine  Pager [phone] (M-F 8AM-5PM)  Other Times: [phone]    Patient is a 42y old  Male who presents with a chief complaint of ETOH withdrawal (01 Oct 2022 13:14)    SUBJECTIVE / OVERNIGHT EVENTS: no acute events overnight. Feels well. Waiting for ride home    MEDICATIONS  (STANDING):  folic acid 1 milliGRAM(s) Oral daily  influenza   Vaccine 0.5 milliLiter(s) IntraMuscular once  LORazepam     Tablet   Oral   LORazepam     Tablet 0.5 milliGRAM(s) Oral every 12 hours  metoprolol succinate ER 25 milliGRAM(s) Oral daily  multivitamin 1 Tablet(s) Oral daily  sodium chloride 0.9%. 1000 milliLiter(s) (75 mL/Hr) IV Continuous <Continuous>    MEDICATIONS  (PRN):  LORazepam     Tablet 2 milliGRAM(s) Oral every 2 hours PRN Symptom-triggered 2 point increase in CIWA-Ar  LORazepam     Tablet 2 milliGRAM(s) Oral every 1 hour PRN Symptom-triggered: each CIWA -Ar score 8 or GREATER      PHYSICAL EXAM:  Vital Signs Last 24 Hrs  T(C): 36.7 (03 Oct 2022 10:16), Max: 37 (02 Oct 2022 17:57)  T(F): 98 (03 Oct 2022 10:16), Max: 98.6 (02 Oct 2022 17:57)  HR: 87 (03 Oct 2022 10:16) (81 - 92)  BP: 109/71 (03 Oct 2022 10:16) (106/63 - 131/85)  BP(mean): --  RR: 17 (03 Oct 2022 10:16) (17 - 18)  SpO2: 99% (03 Oct 2022 10:16) (99% - 99%)    Parameters below as of 03 Oct 2022 10:16  Patient On (Oxygen Delivery Method): room air            CONSTITUTIONAL: NAD, well-developed, well-groomed  RESPIRATORY: Normal respiratory effort; lungs are clear to auscultation bilaterally  CARDIOVASCULAR: Regular rate and rhythm, normal S1 and S2, no murmur/rub/gallop; No lower extremity edema  GASTROINTESTINAL: Nontender to palpation, normoactive bowel sounds, no rebound/guarding; No hepatosplenomegaly  MUSCULOSKELETAL:  no clubbing or cyanosis of digits; no joint swelling or tenderness to palpation  NEUROLOGY: non-focal; no gross sensory deficits   PSYCH: A+O to person, place, and time; affect appropriate  SKIN: No rashes; warm     LABS:                                   10.9   2.53  )-----------( 130      ( 02 Oct 2022 07:05 )             32.7               RADIOLOGY & ADDITIONAL TESTS:  Results Reviewed:   Imaging Personally Reviewed:  Electrocardiogram Personally Reviewed:    COORDINATION OF CARE:  Care Discussed with Consultants/Other Providers [Y/N]:  Prior or Outpatient Records Reviewed [Y/N]:  
Janice Mcconnell MD  [facility] Division of Hospital Medicine  Pager [phone] (M-F 8AM-5PM)  Other Times: [phone]    Patient is a 42y old  Male who presents with a chief complaint of ETOH withdrawal (01 Oct 2022 10:10)    SUBJECTIVE / OVERNIGHT EVENTS: no acute events overnight, doing well     MEDICATIONS  (STANDING):  folic acid 1 milliGRAM(s) Oral daily  influenza   Vaccine 0.5 milliLiter(s) IntraMuscular once  LORazepam     Tablet 0.5 milliGRAM(s) Oral every 12 hours  LORazepam     Tablet   Oral   metoprolol succinate ER 25 milliGRAM(s) Oral daily  multivitamin 1 Tablet(s) Oral daily  sodium chloride 0.9%. 1000 milliLiter(s) (75 mL/Hr) IV Continuous <Continuous>    MEDICATIONS  (PRN):  LORazepam     Tablet 2 milliGRAM(s) Oral every 2 hours PRN Symptom-triggered 2 point increase in CIWA-Ar  LORazepam     Tablet 2 milliGRAM(s) Oral every 1 hour PRN Symptom-triggered: each CIWA -Ar score 8 or GREATER      PHYSICAL EXAM:  Vital Signs Last 24 Hrs  T(C): 37 (01 Oct 2022 11:30), Max: 37.7 (01 Oct 2022 05:05)  T(F): 98.6 (01 Oct 2022 11:30), Max: 99.8 (01 Oct 2022 05:05)  HR: 96 (01 Oct 2022 11:30) (94 - 100)  BP: 104/62 (01 Oct 2022 11:30) (104/62 - 138/72)  BP(mean): --  RR: 18 (01 Oct 2022 11:30) (17 - 18)  SpO2: 98% (01 Oct 2022 11:30) (98% - 100%)    Parameters below as of 01 Oct 2022 11:30  Patient On (Oxygen Delivery Method): room air        CONSTITUTIONAL: NAD, well-developed, well-groomed  RESPIRATORY: Normal respiratory effort; lungs are clear to auscultation bilaterally  CARDIOVASCULAR: Regular rate and rhythm, normal S1 and S2, no murmur/rub/gallop; No lower extremity edema  GASTROINTESTINAL: Nontender to palpation, normoactive bowel sounds, no rebound/guarding; No hepatosplenomegaly  MUSCULOSKELETAL:  no clubbing or cyanosis of digits; no joint swelling or tenderness to palpation  NEUROLOGY: non-focal; no gross sensory deficits   PSYCH: A+O to person, place, and time; affect appropriate  SKIN: No rashes; warm     LABS:                        11.1   2.57  )-----------( 128      ( 01 Oct 2022 04:40 )             32.9     10-01    133<L>  |  99  |  9   ----------------------------<  106<H>  4.4   |  20<L>  |  0.77    Ca    8.9      01 Oct 2022 04:40  Phos  3.9     10-01  Mg     1.90     10-01    TPro  6.9  /  Alb  4.0  /  TBili  0.5  /  DBili  x   /  AST  81<H>  /  ALT  96<H>  /  AlkPhos  65  10-01    PT/INR - ( 29 Sep 2022 13:40 )   PT: 11.1 sec;   INR: 0.96 ratio         PTT - ( 29 Sep 2022 13:40 )  PTT:25.9 sec            RADIOLOGY & ADDITIONAL TESTS:  Results Reviewed:   Imaging Personally Reviewed:  Electrocardiogram Personally Reviewed:    COORDINATION OF CARE:  Care Discussed with Consultants/Other Providers [Y/N]:  Prior or Outpatient Records Reviewed [Y/N]:  
Janice Mcconnell MD  [facility] Division of Hospital Medicine  Pager [phone] (M-F 8AM-5PM)  Other Times: [phone]    Patient is a 42y old  Male who presents with a chief complaint of ETOH withdrawal (27 Sep 2022 16:39)    SUBJECTIVE / OVERNIGHT EVENTS:    MEDICATIONS  (STANDING):  folic acid 1 milliGRAM(s) Oral daily  influenza   Vaccine 0.5 milliLiter(s) IntraMuscular once  LORazepam     Tablet   Oral   LORazepam     Tablet 1.5 milliGRAM(s) Oral every 4 hours  metoprolol succinate ER 25 milliGRAM(s) Oral daily  multivitamin 1 Tablet(s) Oral daily  thiamine 100 milliGRAM(s) Oral daily    MEDICATIONS  (PRN):  LORazepam     Tablet 2 milliGRAM(s) Oral every 2 hours PRN Symptom-triggered 2 point increase in CIWA-Ar  LORazepam     Tablet 2 milliGRAM(s) Oral every 1 hour PRN Symptom-triggered: each CIWA -Ar score 8 or GREATER      PHYSICAL EXAM:  Vital Signs Last 24 Hrs  T(C): 36.7 (28 Sep 2022 11:15), Max: 36.7 (27 Sep 2022 15:30)  T(F): 98 (28 Sep 2022 11:15), Max: 98.1 (28 Sep 2022 07:30)  HR: 110 (28 Sep 2022 11:15) (90 - 131)  BP: 119/80 (28 Sep 2022 11:15) (114/78 - 142/94)  RR: 17 (28 Sep 2022 11:15) (17 - 18)  SpO2: 97% (28 Sep 2022 11:15) (97% - 100%)    Parameters below as of 28 Sep 2022 11:15  Patient On (Oxygen Delivery Method): room air        CONSTITUTIONAL: NAD, well-developed, well-groomed  RESPIRATORY: Normal respiratory effort; lungs are clear to auscultation bilaterally  CARDIOVASCULAR: Regular rate and rhythm, normal S1 and S2, no murmur/rub/gallop; No lower extremity edema  GASTROINTESTINAL: Nontender to palpation, normoactive bowel sounds, no rebound/guarding; No hepatosplenomegaly  MUSCULOSKELETAL:  no clubbing or cyanosis of digits; no joint swelling or tenderness to palpation  NEUROLOGY: non-focal; no gross sensory deficits   PSYCH: A+O to person, place, and time; affect appropriate  SKIN: No rashes; warm     LABS:                        12.1   3.03  )-----------( 82       ( 28 Sep 2022 06:15 )             34.8     09-28    138  |  99  |  12  ----------------------------<  95  3.9   |  26  |  0.72    Ca    9.7      28 Sep 2022 06:15  Phos  4.4     09-28  Mg     1.60     09-28    TPro  7.2  /  Alb  4.1  /  TBili  0.9  /  DBili  x   /  AST  86<H>  /  ALT  112<H>  /  AlkPhos  96  09-28                RADIOLOGY & ADDITIONAL TESTS:  Results Reviewed:   Imaging Personally Reviewed:  Electrocardiogram Personally Reviewed:    COORDINATION OF CARE:  Care Discussed with Consultants/Other Providers [Y/N]:  Prior or Outpatient Records Reviewed [Y/N]:  
Janice Mcconnell MD  [facility] Division of Hospital Medicine  Pager [phone] (M-F 8AM-5PM)  Other Times: [id number]    Patient is a 42y old  Male who presents with a chief complaint of ETOH withdrawal (01 Oct 2022 13:14)    SUBJECTIVE / OVERNIGHT EVENTS: no acute events overnight    MEDICATIONS  (STANDING):  folic acid 1 milliGRAM(s) Oral daily  influenza   Vaccine 0.5 milliLiter(s) IntraMuscular once  LORazepam     Tablet   Oral   LORazepam     Tablet 0.5 milliGRAM(s) Oral every 12 hours  metoprolol succinate ER 25 milliGRAM(s) Oral daily  multivitamin 1 Tablet(s) Oral daily  sodium chloride 0.9%. 1000 milliLiter(s) (75 mL/Hr) IV Continuous <Continuous>    MEDICATIONS  (PRN):  LORazepam     Tablet 2 milliGRAM(s) Oral every 2 hours PRN Symptom-triggered 2 point increase in CIWA-Ar  LORazepam     Tablet 2 milliGRAM(s) Oral every 1 hour PRN Symptom-triggered: each CIWA -Ar score 8 or GREATER      PHYSICAL EXAM:  Vital Signs Last 24 Hrs  T(C): 37.1 (02 Oct 2022 03:30), Max: 37.1 (01 Oct 2022 15:30)  T(F): 98.8 (02 Oct 2022 03:30), Max: 98.8 (02 Oct 2022 03:30)  HR: 83 (02 Oct 2022 06:00) (74 - 98)  BP: 98/65 (02 Oct 2022 06:00) (98/65 - 115/69)  BP(mean): --  RR: 18 (02 Oct 2022 03:30) (17 - 18)  SpO2: 98% (02 Oct 2022 03:30) (98% - 99%)    Parameters below as of 02 Oct 2022 03:30  Patient On (Oxygen Delivery Method): room air        CONSTITUTIONAL: NAD, well-developed, well-groomed  RESPIRATORY: Normal respiratory effort; lungs are clear to auscultation bilaterally  CARDIOVASCULAR: Regular rate and rhythm, normal S1 and S2, no murmur/rub/gallop; No lower extremity edema  GASTROINTESTINAL: Nontender to palpation, normoactive bowel sounds, no rebound/guarding; No hepatosplenomegaly  MUSCULOSKELETAL:  no clubbing or cyanosis of digits; no joint swelling or tenderness to palpation  NEUROLOGY: non-focal; no gross sensory deficits   PSYCH: A+O to person, place, and time; affect appropriate  SKIN: No rashes; warm     LABS:                        10.9   2.53  )-----------( 130      ( 02 Oct 2022 07:05 )             32.7     10-01    133<L>  |  99  |  9   ----------------------------<  106<H>  4.4   |  20<L>  |  0.77    Ca    8.9      01 Oct 2022 04:40  Phos  3.9     10-01  Mg     1.90     10-01    TPro  6.9  /  Alb  4.0  /  TBili  0.5  /  DBili  x   /  AST  81<H>  /  ALT  96<H>  /  AlkPhos  65  10-01                RADIOLOGY & ADDITIONAL TESTS:  Results Reviewed:   Imaging Personally Reviewed:  Electrocardiogram Personally Reviewed:    COORDINATION OF CARE:  Care Discussed with Consultants/Other Providers [Y/N]:  Prior or Outpatient Records Reviewed [Y/N]:  
Janice Mcconnell MD  [facility] Division of Hospital Medicine  Pager [phone] (M-F 8AM-5PM)  Other Times: [id number]    Patient is a 42y old  Male who presents with a chief complaint of ETOH withdrawal (28 Sep 2022 15:22)    SUBJECTIVE / OVERNIGHT EVENTS: tachycardic to 170s-180s while ambulating today     MEDICATIONS  (STANDING):  folic acid 1 milliGRAM(s) Oral daily  influenza   Vaccine 0.5 milliLiter(s) IntraMuscular once  LORazepam     Tablet   Oral   LORazepam     Tablet 1 milliGRAM(s) Oral every 4 hours  metoprolol succinate ER 25 milliGRAM(s) Oral daily  multivitamin 1 Tablet(s) Oral daily    MEDICATIONS  (PRN):  LORazepam     Tablet 2 milliGRAM(s) Oral every 2 hours PRN Symptom-triggered 2 point increase in CIWA-Ar  LORazepam     Tablet 2 milliGRAM(s) Oral every 1 hour PRN Symptom-triggered: each CIWA -Ar score 8 or GREATER      PHYSICAL EXAM:  Vital Signs Last 24 Hrs  T(C): 36.8 (29 Sep 2022 14:20), Max: 36.9 (29 Sep 2022 10:20)  T(F): 98.3 (29 Sep 2022 14:20), Max: 98.5 (29 Sep 2022 10:20)  HR: 111 (29 Sep 2022 14:20) (80 - 175)  BP: 130/76 (29 Sep 2022 14:20) (110/72 - 130/76)  BP(mean): --  RR: 17 (29 Sep 2022 14:20) (17 - 18)  SpO2: 100% (29 Sep 2022 14:20) (98% - 100%)    Parameters below as of 29 Sep 2022 14:20  Patient On (Oxygen Delivery Method): room air        CONSTITUTIONAL: NAD, well-developed, well-groomed  RESPIRATORY: Normal respiratory effort; lungs are clear to auscultation bilaterally  CARDIOVASCULAR: Regular rate and rhythm, normal S1 and S2, no murmur/rub/gallop; No lower extremity edema  GASTROINTESTINAL: Nontender to palpation, normoactive bowel sounds, no rebound/guarding; No hepatosplenomegaly  MUSCULOSKELETAL:  no clubbing or cyanosis of digits; no joint swelling or tenderness to palpation  NEUROLOGY: non-focal; no gross sensory deficits   PSYCH: A+O to person, place, and time; affect appropriate  SKIN: No rashes; warm     LABS:                        11.9   3.01  )-----------( 125      ( 29 Sep 2022 06:52 )             34.0     09-29    136  |  99  |  9   ----------------------------<  97  4.0   |  25  |  0.69    Ca    9.6      29 Sep 2022 06:52  Phos  3.8     09-29  Mg     1.90     09-29    TPro  6.9  /  Alb  4.2  /  TBili  0.8  /  DBili  x   /  AST  106<H>  /  ALT  127<H>  /  AlkPhos  82  09-29                RADIOLOGY & ADDITIONAL TESTS:  Results Reviewed:   Imaging Personally Reviewed:  Electrocardiogram Personally Reviewed:    COORDINATION OF CARE:  Care Discussed with Consultants/Other Providers [Y/N]:  Prior or Outpatient Records Reviewed [Y/N]:  
Janice Mcconnell MD  [facility] Division of Hospital Medicine  Pager [phone] (M-F 8AM-5PM)  Other Times: [phone]    Patient is a 42y old  Male who presents with a chief complaint of ETOH withdrawal (26 Sep 2022 15:23)    SUBJECTIVE / OVERNIGHT EVENTS: no acute events overnight    MEDICATIONS  (STANDING):  folic acid 1 milliGRAM(s) Oral daily  influenza   Vaccine 0.5 milliLiter(s) IntraMuscular once  LORazepam     Tablet   Oral   LORazepam     Tablet 2 milliGRAM(s) Oral every 4 hours  magnesium oxide 400 milliGRAM(s) Oral three times a day with meals  metoprolol succinate ER 25 milliGRAM(s) Oral daily  multivitamin 1 Tablet(s) Oral daily  thiamine 100 milliGRAM(s) Oral daily    MEDICATIONS  (PRN):  LORazepam     Tablet 2 milliGRAM(s) Oral every 2 hours PRN Symptom-triggered 2 point increase in CIWA-Ar  LORazepam     Tablet 2 milliGRAM(s) Oral every 1 hour PRN Symptom-triggered: each CIWA -Ar score 8 or GREATER      PHYSICAL EXAM:  Vital Signs Last 24 Hrs  T(C): 36.7 (27 Sep 2022 15:30), Max: 37.3 (27 Sep 2022 11:30)  T(F): 98 (27 Sep 2022 15:30), Max: 99.2 (27 Sep 2022 11:30)  HR: 110 (27 Sep 2022 15:30) (100 - 117)  BP: 115/75 (27 Sep 2022 15:30) (101/59 - 134/90)  BP(mean): --  RR: 18 (27 Sep 2022 15:30) (16 - 19)  SpO2: 99% (27 Sep 2022 15:30) (98% - 100%)    Parameters below as of 27 Sep 2022 15:30  Patient On (Oxygen Delivery Method): room air        CONSTITUTIONAL: NAD, well-developed, well-groomed  RESPIRATORY: Normal respiratory effort; lungs are clear to auscultation bilaterally  CARDIOVASCULAR: Regular rate and rhythm, normal S1 and S2, no murmur/rub/gallop; No lower extremity edema  GASTROINTESTINAL: Nontender to palpation, normoactive bowel sounds, no rebound/guarding; No hepatosplenomegaly  MUSCULOSKELETAL:  no clubbing or cyanosis of digits; no joint swelling or tenderness to palpation  NEUROLOGY: non-focal; no gross sensory deficits   PSYCH: A+O to person, place, and time; affect appropriate  SKIN: No rashes; warm     LABS:                        13.8   3.79  )-----------( 61       ( 27 Sep 2022 07:25 )             38.3     09-27    138  |  97<L>  |  9   ----------------------------<  108<H>  3.2<L>   |  26  |  0.81    Ca    10.1      27 Sep 2022 07:25  Phos  4.2     09-27  Mg     1.50     09-27    TPro  8.0  /  Alb  4.8  /  TBili  1.3<H>  /  DBili  x   /  AST  128<H>  /  ALT  150<H>  /  AlkPhos  124<H>  09-27                RADIOLOGY & ADDITIONAL TESTS:  Results Reviewed:   Imaging Personally Reviewed:  Electrocardiogram Personally Reviewed:    COORDINATION OF CARE:  Care Discussed with Consultants/Other Providers [Y/N]:  Prior or Outpatient Records Reviewed [Y/N]:

## 2022-10-03 NOTE — PROGRESS NOTE ADULT - PROBLEM SELECTOR PLAN 3
AST/ALT elevated   - likely d/t ETOH use disorder +/- NAFLD  - f/u lipid profile and hep panel   - trend LFTs
Downtrending   - likely d/t ETOH use disorder +/- NAFLD  - f/u lipid profile and hep panel   - trend LFTs
Downtrending   - likely d/t ETOH use disorder +/- NAFLD  - trend LFTs
Downtrending   - likely d/t ETOH use disorder +/- NAFLD  - f/u lipid profile and hep panel   - trend LFTs
AST/ALT elevated   - likely d/t ETOH use disorder +/- NAFLD  - f/u lipid profile and hep panel   - trend LFTs

## 2022-10-03 NOTE — DISCHARGE NOTE NURSING/CASE MANAGEMENT/SOCIAL WORK - [email or web address]_GEN_ALL_CORE
For information on Fall & Injury Prevention, visit: [email or web address] OR  [email or web address] OR  https://www.cdc.gov/steadi/patient.html

## 2022-10-03 NOTE — PROGRESS NOTE ADULT - ASSESSMENT
42M with hx of EtOH use, anxiety admitted for EtOH withdrawal. 

## 2022-10-03 NOTE — PROGRESS NOTE ADULT - PROBLEM SELECTOR PLAN 2
Na 133 improving  - likely hypovolemic from poor PO intake  - IV hydration ordered   - trend BMP
Na 133 improving  - likely hypovolemic from poor PO intake  - IV hydration ordered   - trend BMP
Resolved   - likely hypovolemic from poor PO intake  - s/p IV hydration, encouraged PO intake  - trend BMP
Resolved   - likely hypovolemic from poor PO intake  - s/p IV hydration, encouraged PO intake  - trend BMP
Na 128  - likely hypovolemic from poor PO intake  - c/w IV hydration, repeat BMP
Had improved but 127 today  - likely hypovolemic from poor PO intake  - IV hydration ordered   - trend BMP
Na 133 improving  - likely hypovolemic from poor PO intake
Resolved   - likely hypovolemic from poor PO intake  - s/p IV hydration, encouraged PO intake  - trend BMP

## 2022-10-03 NOTE — PROGRESS NOTE ADULT - PROBLEM SELECTOR PLAN 1
tremors on exam, CIWAs 2-5  - c/w ativan taper and CIWA monitoring  - c/w thiamine, multivitamin, folic acid   - per psych not a candidate for naltrexone at this time d/t transaminitis and acute withdrawal  - SW eval for EtOH rehab
tremors on exam, CIWAs 0-5s  - c/w ativan taper and CIWA monitoring  - c/w thiamine, multivitamin, folic acid   - per psych not a candidate for naltrexone at this time d/t transaminitis and acute withdrawal  - tachycardia improved, likely all related to EtOH withdrawal, CTA obtained given sedentary lifestyle negative for PE, indeterminate GGO needs outpatient surveillance imaging
tremors on exam, CIWAs 2-5  - c/w ativan taper and CIWA monitoring  - c/w thiamine, multivitamin, folic acid   - per psych not a candidate for naltrexone at this time d/t transaminitis and acute withdrawal  - SW eval for EtOH rehab
tremors on exam now resolved  - s/p ativan taper and CIWA monitoring  - c/w thiamine, multivitamin, folic acid   - per psych not a candidate for naltrexone at this time d/t transaminitis and acute withdrawal  - tachycardia improved, likely all related to EtOH withdrawal, CTA obtained given sedentary lifestyle negative for PE, indeterminate GGO needs outpatient surveillance imaging
tremors on exam, CIWAs 0-5s  - c/w ativan taper and CIWA monitoring  - c/w thiamine, multivitamin, folic acid   - per psych not a candidate for naltrexone at this time d/t transaminitis and acute withdrawal  - tachycardia improved, likely all related to EtOH withdrawal, CTA obtained given sedentary lifestyle negative for PE, indeterminate GGO needs outpatient surveillance imaging
tremors on exam, CIWAs 0-5s  - c/w ativan taper and CIWA monitoring  - c/w thiamine, multivitamin, folic acid   - per psych not a candidate for naltrexone at this time d/t transaminitis and acute withdrawal  - tachycardia improved, likely all related to EtOH withdrawal, CTA obtained given sedentary lifestyle negative for PE, indeterminate GGO needs outpatient surveillance imaging
tremors on exam, CIWAs 0-5s  - c/w ativan taper and CIWA monitoring  - c/w thiamine, multivitamin, folic acid   - per psych not a candidate for naltrexone at this time d/t transaminitis and acute withdrawal  - tachycardic to 170s today, remainder VSS, likely related to withdrawal however given elevated D-Dimer and sedentary lifestyle will obtain CT angio to r/o PE
tremors on exam, CIWAs 1-5  - c/w ativan taper and CIWA monitoring  - c/w thiamine, multivitamin, folic acid   - per psych not a candidate for naltrexone  -  eval for inpatient psychiatry

## 2022-10-03 NOTE — PROGRESS NOTE ADULT - PROBLEM SELECTOR PLAN 6
DVT ppx: Improve score 0  DISPO: Rehab
DVT ppx: Improve score 0  DISPO: Home PT. Patient doesn't have ride today plan for dc Monday.
DVT ppx: Improve score 0  DISPO: Rehab
DVT ppx: Improve score 0  DISPO: Home PT
DVT ppx: Improve score 0  DISPO: Home PT.     dc home today. Time spent 4o mins
DVT ppx: Improve score 0  DISPO: TBD - possible EtOH rehab
DVT ppx: Improve score 0  DISPO: Rehab
DVT ppx: Improve score 0  DISPO: TBD - possible EtOH rehab

## 2023-01-01 ENCOUNTER — INPATIENT (INPATIENT)
Facility: HOSPITAL | Age: 43
LOS: 3 days | Discharge: HOME | End: 2023-12-15
Attending: INTERNAL MEDICINE | Admitting: INTERNAL MEDICINE
Payer: MEDICAID

## 2023-01-01 ENCOUNTER — TRANSCRIPTION ENCOUNTER (OUTPATIENT)
Age: 43
End: 2023-01-01

## 2023-01-01 ENCOUNTER — INPATIENT (INPATIENT)
Facility: HOSPITAL | Age: 43
LOS: 3 days | Discharge: HOME | End: 2023-09-03
Attending: STUDENT IN AN ORGANIZED HEALTH CARE EDUCATION/TRAINING PROGRAM | Admitting: STUDENT IN AN ORGANIZED HEALTH CARE EDUCATION/TRAINING PROGRAM
Payer: MEDICAID

## 2023-01-01 VITALS
SYSTOLIC BLOOD PRESSURE: 121 MMHG | DIASTOLIC BLOOD PRESSURE: 82 MMHG | TEMPERATURE: 98 F | OXYGEN SATURATION: 100 % | RESPIRATION RATE: 22 BRPM | HEART RATE: 148 BPM

## 2023-01-01 VITALS
SYSTOLIC BLOOD PRESSURE: 112 MMHG | HEART RATE: 75 BPM | DIASTOLIC BLOOD PRESSURE: 76 MMHG | TEMPERATURE: 98 F | OXYGEN SATURATION: 100 % | RESPIRATION RATE: 18 BRPM

## 2023-01-01 VITALS
DIASTOLIC BLOOD PRESSURE: 82 MMHG | HEART RATE: 93 BPM | OXYGEN SATURATION: 100 % | TEMPERATURE: 98 F | SYSTOLIC BLOOD PRESSURE: 116 MMHG | RESPIRATION RATE: 18 BRPM

## 2023-01-01 VITALS
SYSTOLIC BLOOD PRESSURE: 150 MMHG | RESPIRATION RATE: 18 BRPM | HEART RATE: 149 BPM | DIASTOLIC BLOOD PRESSURE: 105 MMHG | TEMPERATURE: 98 F | OXYGEN SATURATION: 97 %

## 2023-01-01 DIAGNOSIS — Z29.9 ENCOUNTER FOR PROPHYLACTIC MEASURES, UNSPECIFIED: ICD-10-CM

## 2023-01-01 DIAGNOSIS — F10.239 ALCOHOL DEPENDENCE WITH WITHDRAWAL, UNSPECIFIED: ICD-10-CM

## 2023-01-01 DIAGNOSIS — F41.9 ANXIETY DISORDER, UNSPECIFIED: ICD-10-CM

## 2023-01-01 DIAGNOSIS — F10.20 ALCOHOL DEPENDENCE, UNCOMPLICATED: ICD-10-CM

## 2023-01-01 DIAGNOSIS — R65.11 SYSTEMIC INFLAMMATORY RESPONSE SYNDROME (SIRS) OF NON-INFECTIOUS ORIGIN WITH ACUTE ORGAN DYSFUNCTION: ICD-10-CM

## 2023-01-01 DIAGNOSIS — K14.8 OTHER DISEASES OF TONGUE: ICD-10-CM

## 2023-01-01 DIAGNOSIS — R51.9 HEADACHE, UNSPECIFIED: ICD-10-CM

## 2023-01-01 DIAGNOSIS — D69.6 THROMBOCYTOPENIA, UNSPECIFIED: ICD-10-CM

## 2023-01-01 DIAGNOSIS — R53.1 WEAKNESS: ICD-10-CM

## 2023-01-01 DIAGNOSIS — R74.01 ELEVATION OF LEVELS OF LIVER TRANSAMINASE LEVELS: ICD-10-CM

## 2023-01-01 DIAGNOSIS — R74.8 ABNORMAL LEVELS OF OTHER SERUM ENZYMES: ICD-10-CM

## 2023-01-01 DIAGNOSIS — I10 ESSENTIAL (PRIMARY) HYPERTENSION: ICD-10-CM

## 2023-01-01 LAB
A1C WITH ESTIMATED AVERAGE GLUCOSE RESULT: 4.9 % — SIGNIFICANT CHANGE UP (ref 4–5.6)
A1C WITH ESTIMATED AVERAGE GLUCOSE RESULT: 5 % — SIGNIFICANT CHANGE UP (ref 4–5.6)
A1C WITH ESTIMATED AVERAGE GLUCOSE RESULT: 5 % — SIGNIFICANT CHANGE UP (ref 4–5.6)
ALBUMIN SERPL ELPH-MCNC: 3.9 G/DL — SIGNIFICANT CHANGE UP (ref 3.3–5)
ALBUMIN SERPL ELPH-MCNC: 4 G/DL — SIGNIFICANT CHANGE UP (ref 3.3–5)
ALBUMIN SERPL ELPH-MCNC: 4 G/DL — SIGNIFICANT CHANGE UP (ref 3.3–5)
ALBUMIN SERPL ELPH-MCNC: 4.1 G/DL — SIGNIFICANT CHANGE UP (ref 3.3–5)
ALBUMIN SERPL ELPH-MCNC: 4.2 G/DL — SIGNIFICANT CHANGE UP (ref 3.3–5)
ALBUMIN SERPL ELPH-MCNC: 4.3 G/DL — SIGNIFICANT CHANGE UP (ref 3.3–5)
ALBUMIN SERPL ELPH-MCNC: 4.3 G/DL — SIGNIFICANT CHANGE UP (ref 3.3–5)
ALBUMIN SERPL ELPH-MCNC: 4.4 G/DL — SIGNIFICANT CHANGE UP (ref 3.3–5)
ALBUMIN SERPL ELPH-MCNC: 4.4 G/DL — SIGNIFICANT CHANGE UP (ref 3.3–5)
ALBUMIN SERPL ELPH-MCNC: 4.5 G/DL — SIGNIFICANT CHANGE UP (ref 3.3–5)
ALBUMIN SERPL ELPH-MCNC: 4.6 G/DL — SIGNIFICANT CHANGE UP (ref 3.3–5)
ALBUMIN SERPL ELPH-MCNC: 4.6 G/DL — SIGNIFICANT CHANGE UP (ref 3.3–5)
ALP SERPL-CCNC: 102 U/L — SIGNIFICANT CHANGE UP (ref 40–120)
ALP SERPL-CCNC: 102 U/L — SIGNIFICANT CHANGE UP (ref 40–120)
ALP SERPL-CCNC: 104 U/L — SIGNIFICANT CHANGE UP (ref 40–120)
ALP SERPL-CCNC: 105 U/L — SIGNIFICANT CHANGE UP (ref 40–120)
ALP SERPL-CCNC: 105 U/L — SIGNIFICANT CHANGE UP (ref 40–120)
ALP SERPL-CCNC: 85 U/L — SIGNIFICANT CHANGE UP (ref 40–120)
ALP SERPL-CCNC: 88 U/L — SIGNIFICANT CHANGE UP (ref 40–120)
ALP SERPL-CCNC: 96 U/L — SIGNIFICANT CHANGE UP (ref 40–120)
ALP SERPL-CCNC: 98 U/L — SIGNIFICANT CHANGE UP (ref 40–120)
ALP SERPL-CCNC: 98 U/L — SIGNIFICANT CHANGE UP (ref 40–120)
ALT FLD-CCNC: 110 U/L — HIGH (ref 4–41)
ALT FLD-CCNC: 132 U/L — HIGH (ref 4–41)
ALT FLD-CCNC: 184 U/L — HIGH (ref 4–41)
ALT FLD-CCNC: 33 U/L — SIGNIFICANT CHANGE UP (ref 4–41)
ALT FLD-CCNC: 33 U/L — SIGNIFICANT CHANGE UP (ref 4–41)
ALT FLD-CCNC: 46 U/L — HIGH (ref 4–41)
ALT FLD-CCNC: 46 U/L — HIGH (ref 4–41)
ALT FLD-CCNC: 55 U/L — HIGH (ref 4–41)
ALT FLD-CCNC: 55 U/L — HIGH (ref 4–41)
ALT FLD-CCNC: 65 U/L — HIGH (ref 4–41)
ALT FLD-CCNC: 65 U/L — HIGH (ref 4–41)
ALT FLD-CCNC: 66 U/L — HIGH (ref 4–41)
ALT FLD-CCNC: 66 U/L — HIGH (ref 4–41)
ALT FLD-CCNC: 79 U/L — HIGH (ref 4–41)
ANION GAP SERPL CALC-SCNC: 11 MMOL/L — SIGNIFICANT CHANGE UP (ref 7–14)
ANION GAP SERPL CALC-SCNC: 11 MMOL/L — SIGNIFICANT CHANGE UP (ref 7–14)
ANION GAP SERPL CALC-SCNC: 12 MMOL/L — SIGNIFICANT CHANGE UP (ref 7–14)
ANION GAP SERPL CALC-SCNC: 13 MMOL/L — SIGNIFICANT CHANGE UP (ref 7–14)
ANION GAP SERPL CALC-SCNC: 13 MMOL/L — SIGNIFICANT CHANGE UP (ref 7–14)
ANION GAP SERPL CALC-SCNC: 14 MMOL/L — SIGNIFICANT CHANGE UP (ref 7–14)
ANION GAP SERPL CALC-SCNC: 16 MMOL/L — HIGH (ref 7–14)
ANION GAP SERPL CALC-SCNC: 18 MMOL/L — HIGH (ref 7–14)
ANION GAP SERPL CALC-SCNC: 19 MMOL/L — HIGH (ref 7–14)
ANION GAP SERPL CALC-SCNC: 20 MMOL/L — HIGH (ref 7–14)
ANION GAP SERPL CALC-SCNC: 21 MMOL/L — HIGH (ref 7–14)
ANION GAP SERPL CALC-SCNC: 21 MMOL/L — HIGH (ref 7–14)
ANISOCYTOSIS BLD QL: SLIGHT — SIGNIFICANT CHANGE UP
APTT BLD: 26.2 SEC — SIGNIFICANT CHANGE UP (ref 24.5–35.6)
APTT BLD: 30.7 SEC — SIGNIFICANT CHANGE UP (ref 24.5–35.6)
APTT BLD: 30.7 SEC — SIGNIFICANT CHANGE UP (ref 24.5–35.6)
AST SERPL-CCNC: 116 U/L — HIGH (ref 4–40)
AST SERPL-CCNC: 214 U/L — HIGH (ref 4–40)
AST SERPL-CCNC: 35 U/L — SIGNIFICANT CHANGE UP (ref 4–40)
AST SERPL-CCNC: 35 U/L — SIGNIFICANT CHANGE UP (ref 4–40)
AST SERPL-CCNC: 54 U/L — HIGH (ref 4–40)
AST SERPL-CCNC: 59 U/L — HIGH (ref 4–40)
AST SERPL-CCNC: 59 U/L — HIGH (ref 4–40)
AST SERPL-CCNC: 74 U/L — HIGH (ref 4–40)
AST SERPL-CCNC: 74 U/L — HIGH (ref 4–40)
AST SERPL-CCNC: 82 U/L — HIGH (ref 4–40)
AST SERPL-CCNC: 82 U/L — HIGH (ref 4–40)
AST SERPL-CCNC: 84 U/L — HIGH (ref 4–40)
AST SERPL-CCNC: 97 U/L — HIGH (ref 4–40)
AST SERPL-CCNC: 97 U/L — HIGH (ref 4–40)
B-OH-BUTYR SERPL-SCNC: <0 MMOL/L — SIGNIFICANT CHANGE UP (ref 0–0.4)
BASE EXCESS BLDV CALC-SCNC: 4.2 MMOL/L — HIGH (ref -2–3)
BASE EXCESS BLDV CALC-SCNC: 4.2 MMOL/L — HIGH (ref -2–3)
BASE EXCESS BLDV CALC-SCNC: 5.2 MMOL/L — HIGH (ref -2–3)
BASE EXCESS BLDV CALC-SCNC: 5.2 MMOL/L — HIGH (ref -2–3)
BASE EXCESS BLDV CALC-SCNC: 6.3 MMOL/L — HIGH (ref -2–3)
BASOPHILS # BLD AUTO: 0 K/UL — SIGNIFICANT CHANGE UP (ref 0–0.2)
BASOPHILS # BLD AUTO: 0.02 K/UL — SIGNIFICANT CHANGE UP (ref 0–0.2)
BASOPHILS # BLD AUTO: 0.12 K/UL — SIGNIFICANT CHANGE UP (ref 0–0.2)
BASOPHILS # BLD AUTO: 0.12 K/UL — SIGNIFICANT CHANGE UP (ref 0–0.2)
BASOPHILS NFR BLD AUTO: 0 % — SIGNIFICANT CHANGE UP (ref 0–2)
BASOPHILS NFR BLD AUTO: 0.5 % — SIGNIFICANT CHANGE UP (ref 0–2)
BASOPHILS NFR BLD AUTO: 0.6 % — SIGNIFICANT CHANGE UP (ref 0–2)
BASOPHILS NFR BLD AUTO: 0.6 % — SIGNIFICANT CHANGE UP (ref 0–2)
BASOPHILS NFR BLD AUTO: 0.7 % — SIGNIFICANT CHANGE UP (ref 0–2)
BASOPHILS NFR BLD AUTO: 0.7 % — SIGNIFICANT CHANGE UP (ref 0–2)
BASOPHILS NFR BLD AUTO: 3.5 % — HIGH (ref 0–2)
BASOPHILS NFR BLD AUTO: 3.5 % — HIGH (ref 0–2)
BILIRUB DIRECT SERPL-MCNC: <0.2 MG/DL — SIGNIFICANT CHANGE UP (ref 0–0.3)
BILIRUB DIRECT SERPL-MCNC: <0.2 MG/DL — SIGNIFICANT CHANGE UP (ref 0–0.3)
BILIRUB INDIRECT FLD-MCNC: >0.4 MG/DL — SIGNIFICANT CHANGE UP (ref 0–1)
BILIRUB INDIRECT FLD-MCNC: >0.4 MG/DL — SIGNIFICANT CHANGE UP (ref 0–1)
BILIRUB SERPL-MCNC: 0.4 MG/DL — SIGNIFICANT CHANGE UP (ref 0.2–1.2)
BILIRUB SERPL-MCNC: 0.6 MG/DL — SIGNIFICANT CHANGE UP (ref 0.2–1.2)
BILIRUB SERPL-MCNC: 0.8 MG/DL — SIGNIFICANT CHANGE UP (ref 0.2–1.2)
BILIRUB SERPL-MCNC: 1.1 MG/DL — SIGNIFICANT CHANGE UP (ref 0.2–1.2)
BILIRUB SERPL-MCNC: 1.1 MG/DL — SIGNIFICANT CHANGE UP (ref 0.2–1.2)
BILIRUB SERPL-MCNC: 1.4 MG/DL — HIGH (ref 0.2–1.2)
BILIRUB SERPL-MCNC: 1.6 MG/DL — HIGH (ref 0.2–1.2)
BILIRUB SERPL-MCNC: 1.7 MG/DL — HIGH (ref 0.2–1.2)
BILIRUB SERPL-MCNC: 1.7 MG/DL — HIGH (ref 0.2–1.2)
BLOOD GAS VENOUS COMPREHENSIVE RESULT: SIGNIFICANT CHANGE UP
BUN SERPL-MCNC: 10 MG/DL — SIGNIFICANT CHANGE UP (ref 7–23)
BUN SERPL-MCNC: 10 MG/DL — SIGNIFICANT CHANGE UP (ref 7–23)
BUN SERPL-MCNC: 11 MG/DL — SIGNIFICANT CHANGE UP (ref 7–23)
BUN SERPL-MCNC: 12 MG/DL — SIGNIFICANT CHANGE UP (ref 7–23)
BUN SERPL-MCNC: 13 MG/DL — SIGNIFICANT CHANGE UP (ref 7–23)
BUN SERPL-MCNC: 16 MG/DL — SIGNIFICANT CHANGE UP (ref 7–23)
BUN SERPL-MCNC: 4 MG/DL — LOW (ref 7–23)
BUN SERPL-MCNC: 4 MG/DL — LOW (ref 7–23)
BUN SERPL-MCNC: 6 MG/DL — LOW (ref 7–23)
BUN SERPL-MCNC: 6 MG/DL — LOW (ref 7–23)
BUN SERPL-MCNC: 8 MG/DL — SIGNIFICANT CHANGE UP (ref 7–23)
BUN SERPL-MCNC: 8 MG/DL — SIGNIFICANT CHANGE UP (ref 7–23)
CALCIUM SERPL-MCNC: 8.5 MG/DL — SIGNIFICANT CHANGE UP (ref 8.4–10.5)
CALCIUM SERPL-MCNC: 8.5 MG/DL — SIGNIFICANT CHANGE UP (ref 8.4–10.5)
CALCIUM SERPL-MCNC: 8.8 MG/DL — SIGNIFICANT CHANGE UP (ref 8.4–10.5)
CALCIUM SERPL-MCNC: 8.9 MG/DL — SIGNIFICANT CHANGE UP (ref 8.4–10.5)
CALCIUM SERPL-MCNC: 9.1 MG/DL — SIGNIFICANT CHANGE UP (ref 8.4–10.5)
CALCIUM SERPL-MCNC: 9.3 MG/DL — SIGNIFICANT CHANGE UP (ref 8.4–10.5)
CALCIUM SERPL-MCNC: 9.4 MG/DL — SIGNIFICANT CHANGE UP (ref 8.4–10.5)
CALCIUM SERPL-MCNC: 9.5 MG/DL — SIGNIFICANT CHANGE UP (ref 8.4–10.5)
CALCIUM SERPL-MCNC: 9.6 MG/DL — SIGNIFICANT CHANGE UP (ref 8.4–10.5)
CHLORIDE BLDV-SCNC: 103 MMOL/L — SIGNIFICANT CHANGE UP (ref 96–108)
CHLORIDE BLDV-SCNC: 103 MMOL/L — SIGNIFICANT CHANGE UP (ref 96–108)
CHLORIDE BLDV-SCNC: 93 MMOL/L — LOW (ref 96–108)
CHLORIDE BLDV-SCNC: 99 MMOL/L — SIGNIFICANT CHANGE UP (ref 96–108)
CHLORIDE BLDV-SCNC: 99 MMOL/L — SIGNIFICANT CHANGE UP (ref 96–108)
CHLORIDE SERPL-SCNC: 100 MMOL/L — SIGNIFICANT CHANGE UP (ref 98–107)
CHLORIDE SERPL-SCNC: 102 MMOL/L — SIGNIFICANT CHANGE UP (ref 98–107)
CHLORIDE SERPL-SCNC: 102 MMOL/L — SIGNIFICANT CHANGE UP (ref 98–107)
CHLORIDE SERPL-SCNC: 105 MMOL/L — SIGNIFICANT CHANGE UP (ref 98–107)
CHLORIDE SERPL-SCNC: 105 MMOL/L — SIGNIFICANT CHANGE UP (ref 98–107)
CHLORIDE SERPL-SCNC: 91 MMOL/L — LOW (ref 98–107)
CHLORIDE SERPL-SCNC: 92 MMOL/L — LOW (ref 98–107)
CHLORIDE SERPL-SCNC: 93 MMOL/L — LOW (ref 98–107)
CHLORIDE SERPL-SCNC: 94 MMOL/L — LOW (ref 98–107)
CHLORIDE SERPL-SCNC: 97 MMOL/L — LOW (ref 98–107)
CHLORIDE SERPL-SCNC: 99 MMOL/L — SIGNIFICANT CHANGE UP (ref 98–107)
CHOLEST SERPL-MCNC: 186 MG/DL — SIGNIFICANT CHANGE UP
CHOLEST SERPL-MCNC: 186 MG/DL — SIGNIFICANT CHANGE UP
CK SERPL-CCNC: 764 U/L — HIGH (ref 30–200)
CK SERPL-CCNC: 764 U/L — HIGH (ref 30–200)
CK SERPL-CCNC: 991 U/L — HIGH (ref 30–200)
CLOSURE TME COLL+EPINEP BLD: 38 K/UL — LOW (ref 150–400)
CLOSURE TME COLL+EPINEP BLD: 38 K/UL — LOW (ref 150–400)
CO2 BLDV-SCNC: 27.5 MMOL/L — HIGH (ref 22–26)
CO2 BLDV-SCNC: 29.6 MMOL/L — HIGH (ref 22–26)
CO2 BLDV-SCNC: 29.6 MMOL/L — HIGH (ref 22–26)
CO2 BLDV-SCNC: 31.2 MMOL/L — HIGH (ref 22–26)
CO2 BLDV-SCNC: 31.2 MMOL/L — HIGH (ref 22–26)
CO2 SERPL-SCNC: 19 MMOL/L — LOW (ref 22–31)
CO2 SERPL-SCNC: 19 MMOL/L — LOW (ref 22–31)
CO2 SERPL-SCNC: 20 MMOL/L — LOW (ref 22–31)
CO2 SERPL-SCNC: 21 MMOL/L — LOW (ref 22–31)
CO2 SERPL-SCNC: 22 MMOL/L — SIGNIFICANT CHANGE UP (ref 22–31)
CO2 SERPL-SCNC: 22 MMOL/L — SIGNIFICANT CHANGE UP (ref 22–31)
CO2 SERPL-SCNC: 24 MMOL/L — SIGNIFICANT CHANGE UP (ref 22–31)
CO2 SERPL-SCNC: 24 MMOL/L — SIGNIFICANT CHANGE UP (ref 22–31)
CO2 SERPL-SCNC: 25 MMOL/L — SIGNIFICANT CHANGE UP (ref 22–31)
CO2 SERPL-SCNC: 26 MMOL/L — SIGNIFICANT CHANGE UP (ref 22–31)
CREAT SERPL-MCNC: 0.67 MG/DL — SIGNIFICANT CHANGE UP (ref 0.5–1.3)
CREAT SERPL-MCNC: 0.71 MG/DL — SIGNIFICANT CHANGE UP (ref 0.5–1.3)
CREAT SERPL-MCNC: 0.72 MG/DL — SIGNIFICANT CHANGE UP (ref 0.5–1.3)
CREAT SERPL-MCNC: 0.73 MG/DL — SIGNIFICANT CHANGE UP (ref 0.5–1.3)
CREAT SERPL-MCNC: 0.76 MG/DL — SIGNIFICANT CHANGE UP (ref 0.5–1.3)
CREAT SERPL-MCNC: 0.76 MG/DL — SIGNIFICANT CHANGE UP (ref 0.5–1.3)
CREAT SERPL-MCNC: 0.78 MG/DL — SIGNIFICANT CHANGE UP (ref 0.5–1.3)
CREAT SERPL-MCNC: 0.79 MG/DL — SIGNIFICANT CHANGE UP (ref 0.5–1.3)
CREAT SERPL-MCNC: 0.82 MG/DL — SIGNIFICANT CHANGE UP (ref 0.5–1.3)
CREAT SERPL-MCNC: 0.84 MG/DL — SIGNIFICANT CHANGE UP (ref 0.5–1.3)
CREAT SERPL-MCNC: 0.84 MG/DL — SIGNIFICANT CHANGE UP (ref 0.5–1.3)
EGFR: 110 ML/MIN/1.73M2 — SIGNIFICANT CHANGE UP
EGFR: 110 ML/MIN/1.73M2 — SIGNIFICANT CHANGE UP
EGFR: 111 ML/MIN/1.73M2 — SIGNIFICANT CHANGE UP
EGFR: 112 ML/MIN/1.73M2 — SIGNIFICANT CHANGE UP
EGFR: 113 ML/MIN/1.73M2 — SIGNIFICANT CHANGE UP
EGFR: 115 ML/MIN/1.73M2 — SIGNIFICANT CHANGE UP
EGFR: 116 ML/MIN/1.73M2 — SIGNIFICANT CHANGE UP
EGFR: 116 ML/MIN/1.73M2 — SIGNIFICANT CHANGE UP
EGFR: 117 ML/MIN/1.73M2 — SIGNIFICANT CHANGE UP
EOSINOPHIL # BLD AUTO: 0 K/UL — SIGNIFICANT CHANGE UP (ref 0–0.5)
EOSINOPHIL # BLD AUTO: 0.02 K/UL — SIGNIFICANT CHANGE UP (ref 0–0.5)
EOSINOPHIL # BLD AUTO: 0.02 K/UL — SIGNIFICANT CHANGE UP (ref 0–0.5)
EOSINOPHIL # BLD AUTO: 0.06 K/UL — SIGNIFICANT CHANGE UP (ref 0–0.5)
EOSINOPHIL # BLD AUTO: 0.06 K/UL — SIGNIFICANT CHANGE UP (ref 0–0.5)
EOSINOPHIL # BLD AUTO: 0.11 K/UL — SIGNIFICANT CHANGE UP (ref 0–0.5)
EOSINOPHIL # BLD AUTO: 0.11 K/UL — SIGNIFICANT CHANGE UP (ref 0–0.5)
EOSINOPHIL # BLD AUTO: 0.12 K/UL — SIGNIFICANT CHANGE UP (ref 0–0.5)
EOSINOPHIL # BLD AUTO: 0.12 K/UL — SIGNIFICANT CHANGE UP (ref 0–0.5)
EOSINOPHIL NFR BLD AUTO: 0 % — SIGNIFICANT CHANGE UP (ref 0–6)
EOSINOPHIL NFR BLD AUTO: 0.7 % — SIGNIFICANT CHANGE UP (ref 0–6)
EOSINOPHIL NFR BLD AUTO: 0.7 % — SIGNIFICANT CHANGE UP (ref 0–6)
EOSINOPHIL NFR BLD AUTO: 1.9 % — SIGNIFICANT CHANGE UP (ref 0–6)
EOSINOPHIL NFR BLD AUTO: 1.9 % — SIGNIFICANT CHANGE UP (ref 0–6)
EOSINOPHIL NFR BLD AUTO: 2.7 % — SIGNIFICANT CHANGE UP (ref 0–6)
EOSINOPHIL NFR BLD AUTO: 2.7 % — SIGNIFICANT CHANGE UP (ref 0–6)
EOSINOPHIL NFR BLD AUTO: 2.8 % — SIGNIFICANT CHANGE UP (ref 0–6)
EOSINOPHIL NFR BLD AUTO: 2.8 % — SIGNIFICANT CHANGE UP (ref 0–6)
ESTIMATED AVERAGE GLUCOSE: 94 — SIGNIFICANT CHANGE UP
ESTIMATED AVERAGE GLUCOSE: 97 — SIGNIFICANT CHANGE UP
ESTIMATED AVERAGE GLUCOSE: 97 — SIGNIFICANT CHANGE UP
ETHANOL SERPL-MCNC: 234 MG/DL — HIGH
ETHANOL SERPL-MCNC: 383 MG/DL — HIGH
ETHANOL SERPL-MCNC: 383 MG/DL — HIGH
FIBRINOGEN AG PPP IA-MCNC: 312 MG/DL — SIGNIFICANT CHANGE UP (ref 206–478)
FIBRINOGEN AG PPP IA-MCNC: 312 MG/DL — SIGNIFICANT CHANGE UP (ref 206–478)
FOLATE SERPL-MCNC: >20 NG/ML — HIGH (ref 3.1–17.5)
GAS PNL BLDV: 125 MMOL/L — LOW (ref 136–145)
GAS PNL BLDV: 137 MMOL/L — SIGNIFICANT CHANGE UP (ref 136–145)
GAS PNL BLDV: 137 MMOL/L — SIGNIFICANT CHANGE UP (ref 136–145)
GAS PNL BLDV: 139 MMOL/L — SIGNIFICANT CHANGE UP (ref 136–145)
GAS PNL BLDV: 139 MMOL/L — SIGNIFICANT CHANGE UP (ref 136–145)
GLUCOSE BLDV-MCNC: 108 MG/DL — HIGH (ref 70–99)
GLUCOSE BLDV-MCNC: 108 MG/DL — HIGH (ref 70–99)
GLUCOSE BLDV-MCNC: 126 MG/DL — HIGH (ref 70–99)
GLUCOSE BLDV-MCNC: 126 MG/DL — HIGH (ref 70–99)
GLUCOSE BLDV-MCNC: 132 MG/DL — HIGH (ref 70–99)
GLUCOSE SERPL-MCNC: 102 MG/DL — HIGH (ref 70–99)
GLUCOSE SERPL-MCNC: 107 MG/DL — HIGH (ref 70–99)
GLUCOSE SERPL-MCNC: 126 MG/DL — HIGH (ref 70–99)
GLUCOSE SERPL-MCNC: 126 MG/DL — HIGH (ref 70–99)
GLUCOSE SERPL-MCNC: 132 MG/DL — HIGH (ref 70–99)
GLUCOSE SERPL-MCNC: 149 MG/DL — HIGH (ref 70–99)
GLUCOSE SERPL-MCNC: 71 MG/DL — SIGNIFICANT CHANGE UP (ref 70–99)
GLUCOSE SERPL-MCNC: 77 MG/DL — SIGNIFICANT CHANGE UP (ref 70–99)
GLUCOSE SERPL-MCNC: 77 MG/DL — SIGNIFICANT CHANGE UP (ref 70–99)
GLUCOSE SERPL-MCNC: 91 MG/DL — SIGNIFICANT CHANGE UP (ref 70–99)
GLUCOSE SERPL-MCNC: 91 MG/DL — SIGNIFICANT CHANGE UP (ref 70–99)
GLUCOSE SERPL-MCNC: 93 MG/DL — SIGNIFICANT CHANGE UP (ref 70–99)
GLUCOSE SERPL-MCNC: 97 MG/DL — SIGNIFICANT CHANGE UP (ref 70–99)
GLUCOSE SERPL-MCNC: 97 MG/DL — SIGNIFICANT CHANGE UP (ref 70–99)
GLUCOSE SERPL-MCNC: 98 MG/DL — SIGNIFICANT CHANGE UP (ref 70–99)
GLUCOSE SERPL-MCNC: 98 MG/DL — SIGNIFICANT CHANGE UP (ref 70–99)
HAPTOGLOB SERPL-MCNC: 97 MG/DL — SIGNIFICANT CHANGE UP (ref 34–200)
HAPTOGLOB SERPL-MCNC: 97 MG/DL — SIGNIFICANT CHANGE UP (ref 34–200)
HAV IGM SER-ACNC: SIGNIFICANT CHANGE UP
HAV IGM SER-ACNC: SIGNIFICANT CHANGE UP
HBV CORE IGM SER-ACNC: SIGNIFICANT CHANGE UP
HBV CORE IGM SER-ACNC: SIGNIFICANT CHANGE UP
HBV SURFACE AG SER-ACNC: SIGNIFICANT CHANGE UP
HBV SURFACE AG SER-ACNC: SIGNIFICANT CHANGE UP
HCO3 BLDV-SCNC: 27 MMOL/L — SIGNIFICANT CHANGE UP (ref 22–29)
HCO3 BLDV-SCNC: 28 MMOL/L — SIGNIFICANT CHANGE UP (ref 22–29)
HCO3 BLDV-SCNC: 28 MMOL/L — SIGNIFICANT CHANGE UP (ref 22–29)
HCO3 BLDV-SCNC: 30 MMOL/L — HIGH (ref 22–29)
HCO3 BLDV-SCNC: 30 MMOL/L — HIGH (ref 22–29)
HCT VFR BLD CALC: 36.3 % — LOW (ref 39–50)
HCT VFR BLD CALC: 37.9 % — LOW (ref 39–50)
HCT VFR BLD CALC: 37.9 % — LOW (ref 39–50)
HCT VFR BLD CALC: 38.4 % — LOW (ref 39–50)
HCT VFR BLD CALC: 40 % — SIGNIFICANT CHANGE UP (ref 39–50)
HCT VFR BLD CALC: 40 % — SIGNIFICANT CHANGE UP (ref 39–50)
HCT VFR BLD CALC: 40.2 % — SIGNIFICANT CHANGE UP (ref 39–50)
HCT VFR BLD CALC: 40.2 % — SIGNIFICANT CHANGE UP (ref 39–50)
HCT VFR BLD CALC: 40.4 % — SIGNIFICANT CHANGE UP (ref 39–50)
HCT VFR BLD CALC: 40.4 % — SIGNIFICANT CHANGE UP (ref 39–50)
HCT VFR BLD CALC: 42.4 % — SIGNIFICANT CHANGE UP (ref 39–50)
HCT VFR BLD CALC: 43.4 % — SIGNIFICANT CHANGE UP (ref 39–50)
HCT VFR BLD CALC: 43.4 % — SIGNIFICANT CHANGE UP (ref 39–50)
HCT VFR BLDA CALC: 39 % — SIGNIFICANT CHANGE UP (ref 39–51)
HCT VFR BLDA CALC: 45 % — SIGNIFICANT CHANGE UP (ref 39–51)
HCT VFR BLDA CALC: 45 % — SIGNIFICANT CHANGE UP (ref 39–51)
HCT VFR BLDA CALC: 49 % — SIGNIFICANT CHANGE UP (ref 39–51)
HCT VFR BLDA CALC: 49 % — SIGNIFICANT CHANGE UP (ref 39–51)
HCV AB S/CO SERPL IA: 0.11 S/CO — SIGNIFICANT CHANGE UP (ref 0–0.99)
HCV AB S/CO SERPL IA: 0.11 S/CO — SIGNIFICANT CHANGE UP (ref 0–0.99)
HCV AB SERPL-IMP: SIGNIFICANT CHANGE UP
HCV AB SERPL-IMP: SIGNIFICANT CHANGE UP
HDLC SERPL-MCNC: 104 MG/DL — SIGNIFICANT CHANGE UP
HDLC SERPL-MCNC: 104 MG/DL — SIGNIFICANT CHANGE UP
HGB BLD CALC-MCNC: 13 G/DL — SIGNIFICANT CHANGE UP (ref 12.6–17.4)
HGB BLD CALC-MCNC: 14.9 G/DL — SIGNIFICANT CHANGE UP (ref 12.6–17.4)
HGB BLD CALC-MCNC: 14.9 G/DL — SIGNIFICANT CHANGE UP (ref 12.6–17.4)
HGB BLD CALC-MCNC: 16.2 G/DL — SIGNIFICANT CHANGE UP (ref 12.6–17.4)
HGB BLD CALC-MCNC: 16.2 G/DL — SIGNIFICANT CHANGE UP (ref 12.6–17.4)
HGB BLD-MCNC: 12.4 G/DL — LOW (ref 13–17)
HGB BLD-MCNC: 12.6 G/DL — LOW (ref 13–17)
HGB BLD-MCNC: 12.6 G/DL — LOW (ref 13–17)
HGB BLD-MCNC: 13.4 G/DL — SIGNIFICANT CHANGE UP (ref 13–17)
HGB BLD-MCNC: 13.8 G/DL — SIGNIFICANT CHANGE UP (ref 13–17)
HGB BLD-MCNC: 13.8 G/DL — SIGNIFICANT CHANGE UP (ref 13–17)
HGB BLD-MCNC: 14.1 G/DL — SIGNIFICANT CHANGE UP (ref 13–17)
HGB BLD-MCNC: 14.1 G/DL — SIGNIFICANT CHANGE UP (ref 13–17)
HGB BLD-MCNC: 14.2 G/DL — SIGNIFICANT CHANGE UP (ref 13–17)
HGB BLD-MCNC: 14.2 G/DL — SIGNIFICANT CHANGE UP (ref 13–17)
HGB BLD-MCNC: 15 G/DL — SIGNIFICANT CHANGE UP (ref 13–17)
HGB BLD-MCNC: 15.5 G/DL — SIGNIFICANT CHANGE UP (ref 13–17)
HGB BLD-MCNC: 15.5 G/DL — SIGNIFICANT CHANGE UP (ref 13–17)
HIV 1+2 AB+HIV1 P24 AG SERPL QL IA: SIGNIFICANT CHANGE UP
HIV 1+2 AB+HIV1 P24 AG SERPL QL IA: SIGNIFICANT CHANGE UP
IANC: 1.44 K/UL — LOW (ref 1.8–7.4)
IANC: 1.44 K/UL — LOW (ref 1.8–7.4)
IANC: 1.96 K/UL — SIGNIFICANT CHANGE UP (ref 1.8–7.4)
IANC: 1.96 K/UL — SIGNIFICANT CHANGE UP (ref 1.8–7.4)
IANC: 2.04 K/UL — SIGNIFICANT CHANGE UP (ref 1.8–7.4)
IANC: 2.04 K/UL — SIGNIFICANT CHANGE UP (ref 1.8–7.4)
IANC: 2.54 K/UL — SIGNIFICANT CHANGE UP (ref 1.8–7.4)
IANC: 2.54 K/UL — SIGNIFICANT CHANGE UP (ref 1.8–7.4)
IANC: 2.84 K/UL — SIGNIFICANT CHANGE UP (ref 1.8–7.4)
IANC: 2.84 K/UL — SIGNIFICANT CHANGE UP (ref 1.8–7.4)
IANC: 3.96 K/UL — SIGNIFICANT CHANGE UP (ref 1.8–7.4)
IMM GRANULOCYTES NFR BLD AUTO: 0.3 % — SIGNIFICANT CHANGE UP (ref 0–0.9)
IMM GRANULOCYTES NFR BLD AUTO: 0.5 % — SIGNIFICANT CHANGE UP (ref 0–0.9)
INR BLD: 0.95 RATIO — SIGNIFICANT CHANGE UP (ref 0.85–1.18)
INR BLD: 0.95 RATIO — SIGNIFICANT CHANGE UP (ref 0.85–1.18)
INR BLD: <0.9 RATIO — LOW (ref 0.85–1.18)
LACTATE BLDV-MCNC: 1.9 MMOL/L — SIGNIFICANT CHANGE UP (ref 0.5–2)
LACTATE BLDV-MCNC: 2.6 MMOL/L — HIGH (ref 0.5–2)
LACTATE BLDV-MCNC: 2.6 MMOL/L — HIGH (ref 0.5–2)
LACTATE BLDV-MCNC: 2.8 MMOL/L — HIGH (ref 0.5–2)
LACTATE BLDV-MCNC: 2.8 MMOL/L — HIGH (ref 0.5–2)
LDH SERPL L TO P-CCNC: 251 U/L — HIGH (ref 135–225)
LDH SERPL L TO P-CCNC: 251 U/L — HIGH (ref 135–225)
LIDOCAIN IGE QN: 18 U/L — SIGNIFICANT CHANGE UP (ref 7–60)
LIDOCAIN IGE QN: 18 U/L — SIGNIFICANT CHANGE UP (ref 7–60)
LIPID PNL WITH DIRECT LDL SERPL: 45 MG/DL — SIGNIFICANT CHANGE UP
LIPID PNL WITH DIRECT LDL SERPL: 45 MG/DL — SIGNIFICANT CHANGE UP
LYMPHOCYTES # BLD AUTO: 0.6 K/UL — LOW (ref 1–3.3)
LYMPHOCYTES # BLD AUTO: 0.7 K/UL — LOW (ref 1–3.3)
LYMPHOCYTES # BLD AUTO: 0.7 K/UL — LOW (ref 1–3.3)
LYMPHOCYTES # BLD AUTO: 0.72 K/UL — LOW (ref 1–3.3)
LYMPHOCYTES # BLD AUTO: 0.72 K/UL — LOW (ref 1–3.3)
LYMPHOCYTES # BLD AUTO: 0.95 K/UL — LOW (ref 1–3.3)
LYMPHOCYTES # BLD AUTO: 0.95 K/UL — LOW (ref 1–3.3)
LYMPHOCYTES # BLD AUTO: 1.14 K/UL — SIGNIFICANT CHANGE UP (ref 1–3.3)
LYMPHOCYTES # BLD AUTO: 1.14 K/UL — SIGNIFICANT CHANGE UP (ref 1–3.3)
LYMPHOCYTES # BLD AUTO: 1.26 K/UL — SIGNIFICANT CHANGE UP (ref 1–3.3)
LYMPHOCYTES # BLD AUTO: 1.26 K/UL — SIGNIFICANT CHANGE UP (ref 1–3.3)
LYMPHOCYTES # BLD AUTO: 11.3 % — LOW (ref 13–44)
LYMPHOCYTES # BLD AUTO: 17.6 % — SIGNIFICANT CHANGE UP (ref 13–44)
LYMPHOCYTES # BLD AUTO: 17.6 % — SIGNIFICANT CHANGE UP (ref 13–44)
LYMPHOCYTES # BLD AUTO: 21.8 % — SIGNIFICANT CHANGE UP (ref 13–44)
LYMPHOCYTES # BLD AUTO: 21.8 % — SIGNIFICANT CHANGE UP (ref 13–44)
LYMPHOCYTES # BLD AUTO: 22.5 % — SIGNIFICANT CHANGE UP (ref 13–44)
LYMPHOCYTES # BLD AUTO: 22.5 % — SIGNIFICANT CHANGE UP (ref 13–44)
LYMPHOCYTES # BLD AUTO: 34.2 % — SIGNIFICANT CHANGE UP (ref 13–44)
LYMPHOCYTES # BLD AUTO: 34.2 % — SIGNIFICANT CHANGE UP (ref 13–44)
LYMPHOCYTES # BLD AUTO: 41.9 % — SIGNIFICANT CHANGE UP (ref 13–44)
LYMPHOCYTES # BLD AUTO: 41.9 % — SIGNIFICANT CHANGE UP (ref 13–44)
MACROCYTES BLD QL: SLIGHT — SIGNIFICANT CHANGE UP
MAGNESIUM SERPL-MCNC: 1.6 MG/DL — SIGNIFICANT CHANGE UP (ref 1.6–2.6)
MAGNESIUM SERPL-MCNC: 1.8 MG/DL — SIGNIFICANT CHANGE UP (ref 1.6–2.6)
MAGNESIUM SERPL-MCNC: 2 MG/DL — SIGNIFICANT CHANGE UP (ref 1.6–2.6)
MAGNESIUM SERPL-MCNC: 2.1 MG/DL — SIGNIFICANT CHANGE UP (ref 1.6–2.6)
MCHC RBC-ENTMCNC: 32.7 PG — SIGNIFICANT CHANGE UP (ref 27–34)
MCHC RBC-ENTMCNC: 32.9 PG — SIGNIFICANT CHANGE UP (ref 27–34)
MCHC RBC-ENTMCNC: 32.9 PG — SIGNIFICANT CHANGE UP (ref 27–34)
MCHC RBC-ENTMCNC: 33.2 GM/DL — SIGNIFICANT CHANGE UP (ref 32–36)
MCHC RBC-ENTMCNC: 33.2 GM/DL — SIGNIFICANT CHANGE UP (ref 32–36)
MCHC RBC-ENTMCNC: 33.2 PG — SIGNIFICANT CHANGE UP (ref 27–34)
MCHC RBC-ENTMCNC: 33.4 PG — SIGNIFICANT CHANGE UP (ref 27–34)
MCHC RBC-ENTMCNC: 33.5 PG — SIGNIFICANT CHANGE UP (ref 27–34)
MCHC RBC-ENTMCNC: 33.7 PG — SIGNIFICANT CHANGE UP (ref 27–34)
MCHC RBC-ENTMCNC: 34.2 GM/DL — SIGNIFICANT CHANGE UP (ref 32–36)
MCHC RBC-ENTMCNC: 34.3 GM/DL — SIGNIFICANT CHANGE UP (ref 32–36)
MCHC RBC-ENTMCNC: 34.3 GM/DL — SIGNIFICANT CHANGE UP (ref 32–36)
MCHC RBC-ENTMCNC: 34.9 GM/DL — SIGNIFICANT CHANGE UP (ref 32–36)
MCHC RBC-ENTMCNC: 35.1 GM/DL — SIGNIFICANT CHANGE UP (ref 32–36)
MCHC RBC-ENTMCNC: 35.1 GM/DL — SIGNIFICANT CHANGE UP (ref 32–36)
MCHC RBC-ENTMCNC: 35.3 GM/DL — SIGNIFICANT CHANGE UP (ref 32–36)
MCHC RBC-ENTMCNC: 35.3 GM/DL — SIGNIFICANT CHANGE UP (ref 32–36)
MCHC RBC-ENTMCNC: 35.4 GM/DL — SIGNIFICANT CHANGE UP (ref 32–36)
MCHC RBC-ENTMCNC: 35.7 GM/DL — SIGNIFICANT CHANGE UP (ref 32–36)
MCHC RBC-ENTMCNC: 35.7 GM/DL — SIGNIFICANT CHANGE UP (ref 32–36)
MCV RBC AUTO: 92.1 FL — SIGNIFICANT CHANGE UP (ref 80–100)
MCV RBC AUTO: 92.1 FL — SIGNIFICANT CHANGE UP (ref 80–100)
MCV RBC AUTO: 92.8 FL — SIGNIFICANT CHANGE UP (ref 80–100)
MCV RBC AUTO: 92.8 FL — SIGNIFICANT CHANGE UP (ref 80–100)
MCV RBC AUTO: 94.4 FL — SIGNIFICANT CHANGE UP (ref 80–100)
MCV RBC AUTO: 94.4 FL — SIGNIFICANT CHANGE UP (ref 80–100)
MCV RBC AUTO: 95.3 FL — SIGNIFICANT CHANGE UP (ref 80–100)
MCV RBC AUTO: 96 FL — SIGNIFICANT CHANGE UP (ref 80–100)
MCV RBC AUTO: 96.6 FL — SIGNIFICANT CHANGE UP (ref 80–100)
MCV RBC AUTO: 96.6 FL — SIGNIFICANT CHANGE UP (ref 80–100)
MCV RBC AUTO: 97.8 FL — SIGNIFICANT CHANGE UP (ref 80–100)
MCV RBC AUTO: 98.4 FL — SIGNIFICANT CHANGE UP (ref 80–100)
MCV RBC AUTO: 98.4 FL — SIGNIFICANT CHANGE UP (ref 80–100)
MONOCYTES # BLD AUTO: 0.23 K/UL — SIGNIFICANT CHANGE UP (ref 0–0.9)
MONOCYTES # BLD AUTO: 0.23 K/UL — SIGNIFICANT CHANGE UP (ref 0–0.9)
MONOCYTES # BLD AUTO: 0.26 K/UL — SIGNIFICANT CHANGE UP (ref 0–0.9)
MONOCYTES # BLD AUTO: 0.26 K/UL — SIGNIFICANT CHANGE UP (ref 0–0.9)
MONOCYTES # BLD AUTO: 0.37 K/UL — SIGNIFICANT CHANGE UP (ref 0–0.9)
MONOCYTES # BLD AUTO: 0.37 K/UL — SIGNIFICANT CHANGE UP (ref 0–0.9)
MONOCYTES # BLD AUTO: 0.38 K/UL — SIGNIFICANT CHANGE UP (ref 0–0.9)
MONOCYTES # BLD AUTO: 0.38 K/UL — SIGNIFICANT CHANGE UP (ref 0–0.9)
MONOCYTES # BLD AUTO: 0.51 K/UL — SIGNIFICANT CHANGE UP (ref 0–0.9)
MONOCYTES # BLD AUTO: 0.57 K/UL — SIGNIFICANT CHANGE UP (ref 0–0.9)
MONOCYTES # BLD AUTO: 0.57 K/UL — SIGNIFICANT CHANGE UP (ref 0–0.9)
MONOCYTES NFR BLD AUTO: 11.8 % — SIGNIFICANT CHANGE UP (ref 2–14)
MONOCYTES NFR BLD AUTO: 11.8 % — SIGNIFICANT CHANGE UP (ref 2–14)
MONOCYTES NFR BLD AUTO: 13.5 % — SIGNIFICANT CHANGE UP (ref 2–14)
MONOCYTES NFR BLD AUTO: 13.5 % — SIGNIFICANT CHANGE UP (ref 2–14)
MONOCYTES NFR BLD AUTO: 7 % — SIGNIFICANT CHANGE UP (ref 2–14)
MONOCYTES NFR BLD AUTO: 7 % — SIGNIFICANT CHANGE UP (ref 2–14)
MONOCYTES NFR BLD AUTO: 8.6 % — SIGNIFICANT CHANGE UP (ref 2–14)
MONOCYTES NFR BLD AUTO: 8.6 % — SIGNIFICANT CHANGE UP (ref 2–14)
MONOCYTES NFR BLD AUTO: 9.1 % — SIGNIFICANT CHANGE UP (ref 2–14)
MONOCYTES NFR BLD AUTO: 9.1 % — SIGNIFICANT CHANGE UP (ref 2–14)
MONOCYTES NFR BLD AUTO: 9.6 % — SIGNIFICANT CHANGE UP (ref 2–14)
NEUTROPHILS # BLD AUTO: 1.44 K/UL — LOW (ref 1.8–7.4)
NEUTROPHILS # BLD AUTO: 1.44 K/UL — LOW (ref 1.8–7.4)
NEUTROPHILS # BLD AUTO: 1.69 K/UL — LOW (ref 1.8–7.4)
NEUTROPHILS # BLD AUTO: 1.69 K/UL — LOW (ref 1.8–7.4)
NEUTROPHILS # BLD AUTO: 2.04 K/UL — SIGNIFICANT CHANGE UP (ref 1.8–7.4)
NEUTROPHILS # BLD AUTO: 2.04 K/UL — SIGNIFICANT CHANGE UP (ref 1.8–7.4)
NEUTROPHILS # BLD AUTO: 2.54 K/UL — SIGNIFICANT CHANGE UP (ref 1.8–7.4)
NEUTROPHILS # BLD AUTO: 2.54 K/UL — SIGNIFICANT CHANGE UP (ref 1.8–7.4)
NEUTROPHILS # BLD AUTO: 2.84 K/UL — SIGNIFICANT CHANGE UP (ref 1.8–7.4)
NEUTROPHILS # BLD AUTO: 2.84 K/UL — SIGNIFICANT CHANGE UP (ref 1.8–7.4)
NEUTROPHILS # BLD AUTO: 4.07 K/UL — SIGNIFICANT CHANGE UP (ref 1.8–7.4)
NEUTROPHILS NFR BLD AUTO: 47.8 % — SIGNIFICANT CHANGE UP (ref 43–77)
NEUTROPHILS NFR BLD AUTO: 47.8 % — SIGNIFICANT CHANGE UP (ref 43–77)
NEUTROPHILS NFR BLD AUTO: 50.9 % — SIGNIFICANT CHANGE UP (ref 43–77)
NEUTROPHILS NFR BLD AUTO: 50.9 % — SIGNIFICANT CHANGE UP (ref 43–77)
NEUTROPHILS NFR BLD AUTO: 60.2 % — SIGNIFICANT CHANGE UP (ref 43–77)
NEUTROPHILS NFR BLD AUTO: 60.2 % — SIGNIFICANT CHANGE UP (ref 43–77)
NEUTROPHILS NFR BLD AUTO: 63.6 % — SIGNIFICANT CHANGE UP (ref 43–77)
NEUTROPHILS NFR BLD AUTO: 63.6 % — SIGNIFICANT CHANGE UP (ref 43–77)
NEUTROPHILS NFR BLD AUTO: 69.6 % — SIGNIFICANT CHANGE UP (ref 43–77)
NEUTROPHILS NFR BLD AUTO: 69.6 % — SIGNIFICANT CHANGE UP (ref 43–77)
NEUTROPHILS NFR BLD AUTO: 76.5 % — SIGNIFICANT CHANGE UP (ref 43–77)
NON HDL CHOLESTEROL: 82 MG/DL — SIGNIFICANT CHANGE UP
NON HDL CHOLESTEROL: 82 MG/DL — SIGNIFICANT CHANGE UP
NRBC # BLD: 0 /100 WBCS — SIGNIFICANT CHANGE UP (ref 0–0)
NRBC # FLD: 0 K/UL — SIGNIFICANT CHANGE UP (ref 0–0)
PCO2 BLDV: 26 MMHG — LOW (ref 42–55)
PCO2 BLDV: 40 MMHG — LOW (ref 42–55)
PCO2 BLDV: 40 MMHG — LOW (ref 42–55)
PCO2 BLDV: 43 MMHG — SIGNIFICANT CHANGE UP (ref 42–55)
PCO2 BLDV: 43 MMHG — SIGNIFICANT CHANGE UP (ref 42–55)
PH BLDV: 7.45 — HIGH (ref 7.32–7.43)
PH BLDV: 7.45 — HIGH (ref 7.32–7.43)
PH BLDV: 7.46 — HIGH (ref 7.32–7.43)
PH BLDV: 7.46 — HIGH (ref 7.32–7.43)
PH BLDV: 7.62 — HIGH (ref 7.32–7.43)
PHENOBARB SERPL-MCNC: 20.5 UG/ML — SIGNIFICANT CHANGE UP (ref 10–40)
PHOSPHATE SERPL-MCNC: 2.8 MG/DL — SIGNIFICANT CHANGE UP (ref 2.5–4.5)
PHOSPHATE SERPL-MCNC: 2.8 MG/DL — SIGNIFICANT CHANGE UP (ref 2.5–4.5)
PHOSPHATE SERPL-MCNC: 3 MG/DL — SIGNIFICANT CHANGE UP (ref 2.5–4.5)
PHOSPHATE SERPL-MCNC: 3 MG/DL — SIGNIFICANT CHANGE UP (ref 2.5–4.5)
PHOSPHATE SERPL-MCNC: 3.2 MG/DL — SIGNIFICANT CHANGE UP (ref 2.5–4.5)
PHOSPHATE SERPL-MCNC: 3.2 MG/DL — SIGNIFICANT CHANGE UP (ref 2.5–4.5)
PHOSPHATE SERPL-MCNC: 3.3 MG/DL — SIGNIFICANT CHANGE UP (ref 2.5–4.5)
PHOSPHATE SERPL-MCNC: 3.5 MG/DL — SIGNIFICANT CHANGE UP (ref 2.5–4.5)
PHOSPHATE SERPL-MCNC: 3.5 MG/DL — SIGNIFICANT CHANGE UP (ref 2.5–4.5)
PHOSPHATE SERPL-MCNC: 3.6 MG/DL — SIGNIFICANT CHANGE UP (ref 2.5–4.5)
PHOSPHATE SERPL-MCNC: 4 MG/DL — SIGNIFICANT CHANGE UP (ref 2.5–4.5)
PHOSPHATE SERPL-MCNC: 4 MG/DL — SIGNIFICANT CHANGE UP (ref 2.5–4.5)
PLAT MORPH BLD: NORMAL — SIGNIFICANT CHANGE UP
PLATELET # BLD AUTO: 41 K/UL — LOW (ref 150–400)
PLATELET # BLD AUTO: 41 K/UL — LOW (ref 150–400)
PLATELET # BLD AUTO: 42 K/UL — LOW (ref 150–400)
PLATELET # BLD AUTO: 42 K/UL — LOW (ref 150–400)
PLATELET # BLD AUTO: 43 K/UL — LOW (ref 150–400)
PLATELET # BLD AUTO: 43 K/UL — LOW (ref 150–400)
PLATELET # BLD AUTO: 52 K/UL — LOW (ref 150–400)
PLATELET # BLD AUTO: 55 K/UL — LOW (ref 150–400)
PLATELET # BLD AUTO: 74 K/UL — LOW (ref 150–400)
PLATELET # BLD AUTO: 74 K/UL — LOW (ref 150–400)
PLATELET # BLD AUTO: 85 K/UL — LOW (ref 150–400)
PLATELET # BLD AUTO: 89 K/UL — LOW (ref 150–400)
PLATELET # BLD AUTO: 89 K/UL — LOW (ref 150–400)
PLATELET COUNT - ESTIMATE: ABNORMAL
PO2 BLDV: 169 MMHG — HIGH (ref 25–45)
PO2 BLDV: 55 MMHG — HIGH (ref 25–45)
PO2 BLDV: 55 MMHG — HIGH (ref 25–45)
PO2 BLDV: 68 MMHG — HIGH (ref 25–45)
PO2 BLDV: 68 MMHG — HIGH (ref 25–45)
POLYCHROMASIA BLD QL SMEAR: SLIGHT — SIGNIFICANT CHANGE UP
POTASSIUM BLDV-SCNC: 3.1 MMOL/L — LOW (ref 3.5–5.1)
POTASSIUM BLDV-SCNC: 3.8 MMOL/L — SIGNIFICANT CHANGE UP (ref 3.5–5.1)
POTASSIUM BLDV-SCNC: 3.8 MMOL/L — SIGNIFICANT CHANGE UP (ref 3.5–5.1)
POTASSIUM BLDV-SCNC: 3.9 MMOL/L — SIGNIFICANT CHANGE UP (ref 3.5–5.1)
POTASSIUM BLDV-SCNC: 3.9 MMOL/L — SIGNIFICANT CHANGE UP (ref 3.5–5.1)
POTASSIUM SERPL-MCNC: 3.1 MMOL/L — LOW (ref 3.5–5.3)
POTASSIUM SERPL-MCNC: 3.3 MMOL/L — LOW (ref 3.5–5.3)
POTASSIUM SERPL-MCNC: 3.3 MMOL/L — LOW (ref 3.5–5.3)
POTASSIUM SERPL-MCNC: 3.4 MMOL/L — LOW (ref 3.5–5.3)
POTASSIUM SERPL-MCNC: 3.4 MMOL/L — LOW (ref 3.5–5.3)
POTASSIUM SERPL-MCNC: 3.5 MMOL/L — SIGNIFICANT CHANGE UP (ref 3.5–5.3)
POTASSIUM SERPL-MCNC: 3.7 MMOL/L — SIGNIFICANT CHANGE UP (ref 3.5–5.3)
POTASSIUM SERPL-MCNC: 3.9 MMOL/L — SIGNIFICANT CHANGE UP (ref 3.5–5.3)
POTASSIUM SERPL-MCNC: 4.5 MMOL/L — SIGNIFICANT CHANGE UP (ref 3.5–5.3)
POTASSIUM SERPL-MCNC: 4.5 MMOL/L — SIGNIFICANT CHANGE UP (ref 3.5–5.3)
POTASSIUM SERPL-MCNC: 5.7 MMOL/L — HIGH (ref 3.5–5.3)
POTASSIUM SERPL-SCNC: 3.1 MMOL/L — LOW (ref 3.5–5.3)
POTASSIUM SERPL-SCNC: 3.3 MMOL/L — LOW (ref 3.5–5.3)
POTASSIUM SERPL-SCNC: 3.3 MMOL/L — LOW (ref 3.5–5.3)
POTASSIUM SERPL-SCNC: 3.4 MMOL/L — LOW (ref 3.5–5.3)
POTASSIUM SERPL-SCNC: 3.4 MMOL/L — LOW (ref 3.5–5.3)
POTASSIUM SERPL-SCNC: 3.5 MMOL/L — SIGNIFICANT CHANGE UP (ref 3.5–5.3)
POTASSIUM SERPL-SCNC: 3.7 MMOL/L — SIGNIFICANT CHANGE UP (ref 3.5–5.3)
POTASSIUM SERPL-SCNC: 3.9 MMOL/L — SIGNIFICANT CHANGE UP (ref 3.5–5.3)
POTASSIUM SERPL-SCNC: 4.5 MMOL/L — SIGNIFICANT CHANGE UP (ref 3.5–5.3)
POTASSIUM SERPL-SCNC: 4.5 MMOL/L — SIGNIFICANT CHANGE UP (ref 3.5–5.3)
POTASSIUM SERPL-SCNC: 5.7 MMOL/L — HIGH (ref 3.5–5.3)
PROT SERPL-MCNC: 6.9 G/DL — SIGNIFICANT CHANGE UP (ref 6–8.3)
PROT SERPL-MCNC: 6.9 G/DL — SIGNIFICANT CHANGE UP (ref 6–8.3)
PROT SERPL-MCNC: 7 G/DL — SIGNIFICANT CHANGE UP (ref 6–8.3)
PROT SERPL-MCNC: 7.3 G/DL — SIGNIFICANT CHANGE UP (ref 6–8.3)
PROT SERPL-MCNC: 7.3 G/DL — SIGNIFICANT CHANGE UP (ref 6–8.3)
PROT SERPL-MCNC: 7.4 G/DL — SIGNIFICANT CHANGE UP (ref 6–8.3)
PROT SERPL-MCNC: 7.4 G/DL — SIGNIFICANT CHANGE UP (ref 6–8.3)
PROT SERPL-MCNC: 7.5 G/DL — SIGNIFICANT CHANGE UP (ref 6–8.3)
PROT SERPL-MCNC: 7.5 G/DL — SIGNIFICANT CHANGE UP (ref 6–8.3)
PROT SERPL-MCNC: 7.6 G/DL — SIGNIFICANT CHANGE UP (ref 6–8.3)
PROT SERPL-MCNC: 7.6 G/DL — SIGNIFICANT CHANGE UP (ref 6–8.3)
PROT SERPL-MCNC: 8.1 G/DL — SIGNIFICANT CHANGE UP (ref 6–8.3)
PROT SERPL-MCNC: 8.1 G/DL — SIGNIFICANT CHANGE UP (ref 6–8.3)
PROT SERPL-MCNC: 8.5 G/DL — HIGH (ref 6–8.3)
PROTHROM AB SERPL-ACNC: 10.7 SEC — SIGNIFICANT CHANGE UP (ref 9.5–13)
PROTHROM AB SERPL-ACNC: 10.7 SEC — SIGNIFICANT CHANGE UP (ref 9.5–13)
PROTHROM AB SERPL-ACNC: 9.1 SEC — LOW (ref 9.5–13)
RBC # BLD: 3.71 M/UL — LOW (ref 4.2–5.8)
RBC # BLD: 3.85 M/UL — LOW (ref 4.2–5.8)
RBC # BLD: 3.85 M/UL — LOW (ref 4.2–5.8)
RBC # BLD: 4 M/UL — LOW (ref 4.2–5.8)
RBC # BLD: 4.16 M/UL — LOW (ref 4.2–5.8)
RBC # BLD: 4.16 M/UL — LOW (ref 4.2–5.8)
RBC # BLD: 4.28 M/UL — SIGNIFICANT CHANGE UP (ref 4.2–5.8)
RBC # BLD: 4.28 M/UL — SIGNIFICANT CHANGE UP (ref 4.2–5.8)
RBC # BLD: 4.31 M/UL — SIGNIFICANT CHANGE UP (ref 4.2–5.8)
RBC # BLD: 4.31 M/UL — SIGNIFICANT CHANGE UP (ref 4.2–5.8)
RBC # BLD: 4.45 M/UL — SIGNIFICANT CHANGE UP (ref 4.2–5.8)
RBC # BLD: 4.71 M/UL — SIGNIFICANT CHANGE UP (ref 4.2–5.8)
RBC # BLD: 4.71 M/UL — SIGNIFICANT CHANGE UP (ref 4.2–5.8)
RBC # FLD: 12.5 % — SIGNIFICANT CHANGE UP (ref 10.3–14.5)
RBC # FLD: 12.5 % — SIGNIFICANT CHANGE UP (ref 10.3–14.5)
RBC # FLD: 12.6 % — SIGNIFICANT CHANGE UP (ref 10.3–14.5)
RBC # FLD: 14.2 % — SIGNIFICANT CHANGE UP (ref 10.3–14.5)
RBC # FLD: 14.2 % — SIGNIFICANT CHANGE UP (ref 10.3–14.5)
RBC # FLD: 14.6 % — HIGH (ref 10.3–14.5)
RBC BLD AUTO: ABNORMAL
SAO2 % BLDV: 81.2 % — SIGNIFICANT CHANGE UP (ref 67–88)
SAO2 % BLDV: 81.2 % — SIGNIFICANT CHANGE UP (ref 67–88)
SAO2 % BLDV: 91.3 % — HIGH (ref 67–88)
SAO2 % BLDV: 91.3 % — HIGH (ref 67–88)
SAO2 % BLDV: 99.6 % — HIGH (ref 67–88)
SODIUM SERPL-SCNC: 133 MMOL/L — LOW (ref 135–145)
SODIUM SERPL-SCNC: 134 MMOL/L — LOW (ref 135–145)
SODIUM SERPL-SCNC: 135 MMOL/L — SIGNIFICANT CHANGE UP (ref 135–145)
SODIUM SERPL-SCNC: 137 MMOL/L — SIGNIFICANT CHANGE UP (ref 135–145)
SODIUM SERPL-SCNC: 138 MMOL/L — SIGNIFICANT CHANGE UP (ref 135–145)
SODIUM SERPL-SCNC: 140 MMOL/L — SIGNIFICANT CHANGE UP (ref 135–145)
SODIUM SERPL-SCNC: 140 MMOL/L — SIGNIFICANT CHANGE UP (ref 135–145)
SODIUM SERPL-SCNC: 142 MMOL/L — SIGNIFICANT CHANGE UP (ref 135–145)
SODIUM SERPL-SCNC: 142 MMOL/L — SIGNIFICANT CHANGE UP (ref 135–145)
T4 FREE SERPL-MCNC: 1.6 NG/DL — SIGNIFICANT CHANGE UP (ref 0.9–1.8)
TRIGL SERPL-MCNC: 186 MG/DL — HIGH
TRIGL SERPL-MCNC: 186 MG/DL — HIGH
TSH SERPL-MCNC: 4.43 UIU/ML — HIGH (ref 0.27–4.2)
VARIANT LYMPHS # BLD: 2.6 % — SIGNIFICANT CHANGE UP (ref 0–6)
VARIANT LYMPHS # BLD: 4.4 % — SIGNIFICANT CHANGE UP (ref 0–6)
VARIANT LYMPHS # BLD: 4.4 % — SIGNIFICANT CHANGE UP (ref 0–6)
VIT B1 SERPL-MCNC: 98.2 NMOL/L — SIGNIFICANT CHANGE UP (ref 66.5–200)
VIT B12 SERPL-MCNC: 790 PG/ML — SIGNIFICANT CHANGE UP (ref 200–900)
WBC # BLD: 2.95 K/UL — LOW (ref 3.8–10.5)
WBC # BLD: 3.01 K/UL — LOW (ref 3.8–10.5)
WBC # BLD: 3.01 K/UL — LOW (ref 3.8–10.5)
WBC # BLD: 3.21 K/UL — LOW (ref 3.8–10.5)
WBC # BLD: 3.21 K/UL — LOW (ref 3.8–10.5)
WBC # BLD: 3.33 K/UL — LOW (ref 3.8–10.5)
WBC # BLD: 3.33 K/UL — LOW (ref 3.8–10.5)
WBC # BLD: 3.57 K/UL — LOW (ref 3.8–10.5)
WBC # BLD: 4.08 K/UL — SIGNIFICANT CHANGE UP (ref 3.8–10.5)
WBC # BLD: 4.08 K/UL — SIGNIFICANT CHANGE UP (ref 3.8–10.5)
WBC # BLD: 4.22 K/UL — SIGNIFICANT CHANGE UP (ref 3.8–10.5)
WBC # BLD: 4.22 K/UL — SIGNIFICANT CHANGE UP (ref 3.8–10.5)
WBC # BLD: 5.32 K/UL — SIGNIFICANT CHANGE UP (ref 3.8–10.5)
WBC # FLD AUTO: 2.95 K/UL — LOW (ref 3.8–10.5)
WBC # FLD AUTO: 3.01 K/UL — LOW (ref 3.8–10.5)
WBC # FLD AUTO: 3.01 K/UL — LOW (ref 3.8–10.5)
WBC # FLD AUTO: 3.21 K/UL — LOW (ref 3.8–10.5)
WBC # FLD AUTO: 3.21 K/UL — LOW (ref 3.8–10.5)
WBC # FLD AUTO: 3.33 K/UL — LOW (ref 3.8–10.5)
WBC # FLD AUTO: 3.33 K/UL — LOW (ref 3.8–10.5)
WBC # FLD AUTO: 3.57 K/UL — LOW (ref 3.8–10.5)
WBC # FLD AUTO: 4.08 K/UL — SIGNIFICANT CHANGE UP (ref 3.8–10.5)
WBC # FLD AUTO: 4.08 K/UL — SIGNIFICANT CHANGE UP (ref 3.8–10.5)
WBC # FLD AUTO: 4.22 K/UL — SIGNIFICANT CHANGE UP (ref 3.8–10.5)
WBC # FLD AUTO: 4.22 K/UL — SIGNIFICANT CHANGE UP (ref 3.8–10.5)
WBC # FLD AUTO: 5.32 K/UL — SIGNIFICANT CHANGE UP (ref 3.8–10.5)

## 2023-01-01 PROCEDURE — 99239 HOSP IP/OBS DSCHRG MGMT >30: CPT

## 2023-01-01 PROCEDURE — 99285 EMERGENCY DEPT VISIT HI MDM: CPT | Mod: 25

## 2023-01-01 PROCEDURE — 99223 1ST HOSP IP/OBS HIGH 75: CPT

## 2023-01-01 PROCEDURE — 99232 SBSQ HOSP IP/OBS MODERATE 35: CPT

## 2023-01-01 PROCEDURE — 99285 EMERGENCY DEPT VISIT HI MDM: CPT

## 2023-01-01 PROCEDURE — 76705 ECHO EXAM OF ABDOMEN: CPT | Mod: 26

## 2023-01-01 PROCEDURE — 71045 X-RAY EXAM CHEST 1 VIEW: CPT | Mod: 26

## 2023-01-01 PROCEDURE — 99291 CRITICAL CARE FIRST HOUR: CPT | Mod: GC

## 2023-01-01 PROCEDURE — 93010 ELECTROCARDIOGRAM REPORT: CPT

## 2023-01-01 PROCEDURE — 36569 INSJ PICC 5 YR+ W/O IMAGING: CPT

## 2023-01-01 PROCEDURE — 12345: CPT | Mod: NC

## 2023-01-01 PROCEDURE — 99233 SBSQ HOSP IP/OBS HIGH 50: CPT

## 2023-01-01 RX ORDER — FOLIC ACID 0.8 MG
1 TABLET ORAL DAILY
Refills: 0 | Status: DISCONTINUED | OUTPATIENT
Start: 2023-01-01 | End: 2023-01-01

## 2023-01-01 RX ORDER — SODIUM CHLORIDE 9 MG/ML
1000 INJECTION, SOLUTION INTRAVENOUS ONCE
Refills: 0 | Status: COMPLETED | OUTPATIENT
Start: 2023-01-01 | End: 2023-01-01

## 2023-01-01 RX ORDER — THIAMINE MONONITRATE (VIT B1) 100 MG
500 TABLET ORAL ONCE
Refills: 0 | Status: COMPLETED | OUTPATIENT
Start: 2023-01-01 | End: 2023-01-01

## 2023-01-01 RX ORDER — POTASSIUM CHLORIDE 20 MEQ
40 PACKET (EA) ORAL EVERY 4 HOURS
Refills: 0 | Status: COMPLETED | OUTPATIENT
Start: 2023-01-01 | End: 2023-01-01

## 2023-01-01 RX ORDER — THIAMINE MONONITRATE (VIT B1) 100 MG
500 TABLET ORAL EVERY 24 HOURS
Refills: 0 | Status: DISCONTINUED | OUTPATIENT
Start: 2023-01-01 | End: 2023-01-01

## 2023-01-01 RX ORDER — QUETIAPINE FUMARATE 200 MG/1
50 TABLET, FILM COATED ORAL ONCE
Refills: 0 | Status: COMPLETED | OUTPATIENT
Start: 2023-01-01 | End: 2023-01-01

## 2023-01-01 RX ORDER — THIAMINE MONONITRATE (VIT B1) 100 MG
100 TABLET ORAL ONCE
Refills: 0 | Status: COMPLETED | OUTPATIENT
Start: 2023-01-01 | End: 2023-01-01

## 2023-01-01 RX ORDER — PHENOBARBITAL 60 MG
260 TABLET ORAL ONCE
Refills: 0 | Status: DISCONTINUED | OUTPATIENT
Start: 2023-01-01 | End: 2023-01-01

## 2023-01-01 RX ORDER — ACETAMINOPHEN 500 MG
650 TABLET ORAL EVERY 6 HOURS
Refills: 0 | Status: DISCONTINUED | OUTPATIENT
Start: 2023-01-01 | End: 2023-01-01

## 2023-01-01 RX ORDER — ENOXAPARIN SODIUM 100 MG/ML
40 INJECTION SUBCUTANEOUS EVERY 24 HOURS
Refills: 0 | Status: DISCONTINUED | OUTPATIENT
Start: 2023-01-01 | End: 2023-01-01

## 2023-01-01 RX ORDER — PHENOBARBITAL 60 MG
390 TABLET ORAL ONCE
Refills: 0 | Status: DISCONTINUED | OUTPATIENT
Start: 2023-01-01 | End: 2023-01-01

## 2023-01-01 RX ORDER — PHENOBARBITAL 60 MG
64.8 TABLET ORAL
Refills: 0 | Status: DISCONTINUED | OUTPATIENT
Start: 2023-01-01 | End: 2023-01-01

## 2023-01-01 RX ORDER — METOPROLOL TARTRATE 50 MG
1 TABLET ORAL
Qty: 0 | Refills: 0 | DISCHARGE

## 2023-01-01 RX ORDER — POTASSIUM CHLORIDE 20 MEQ
40 PACKET (EA) ORAL ONCE
Refills: 0 | Status: COMPLETED | OUTPATIENT
Start: 2023-01-01 | End: 2023-01-01

## 2023-01-01 RX ORDER — THIAMINE MONONITRATE (VIT B1) 100 MG
200 TABLET ORAL DAILY
Refills: 0 | Status: DISCONTINUED | OUTPATIENT
Start: 2023-01-01 | End: 2023-01-01

## 2023-01-01 RX ORDER — QUETIAPINE FUMARATE 200 MG/1
50 TABLET, FILM COATED ORAL EVERY 4 HOURS
Refills: 0 | Status: DISCONTINUED | OUTPATIENT
Start: 2023-01-01 | End: 2023-01-01

## 2023-01-01 RX ORDER — LANOLIN ALCOHOL/MO/W.PET/CERES
3 CREAM (GRAM) TOPICAL AT BEDTIME
Refills: 0 | Status: DISCONTINUED | OUTPATIENT
Start: 2023-01-01 | End: 2023-01-01

## 2023-01-01 RX ORDER — FOLIC ACID 0.8 MG
1 TABLET ORAL ONCE
Refills: 0 | Status: COMPLETED | OUTPATIENT
Start: 2023-01-01 | End: 2023-01-01

## 2023-01-01 RX ORDER — THIAMINE MONONITRATE (VIT B1) 100 MG
1 TABLET ORAL
Qty: 30 | Refills: 0
Start: 2023-01-01 | End: 2023-01-01

## 2023-01-01 RX ORDER — QUETIAPINE FUMARATE 200 MG/1
25 TABLET, FILM COATED ORAL EVERY 12 HOURS
Refills: 0 | Status: DISCONTINUED | OUTPATIENT
Start: 2023-01-01 | End: 2023-01-01

## 2023-01-01 RX ORDER — METOPROLOL TARTRATE 50 MG
25 TABLET ORAL DAILY
Refills: 0 | Status: DISCONTINUED | OUTPATIENT
Start: 2023-01-01 | End: 2023-01-01

## 2023-01-01 RX ORDER — PHENOBARBITAL 60 MG
32.4 TABLET ORAL
Refills: 0 | Status: CANCELLED | OUTPATIENT
Start: 2023-01-01 | End: 2023-01-01

## 2023-01-01 RX ORDER — SODIUM CHLORIDE 9 MG/ML
1000 INJECTION INTRAMUSCULAR; INTRAVENOUS; SUBCUTANEOUS ONCE
Refills: 0 | Status: COMPLETED | OUTPATIENT
Start: 2023-01-01 | End: 2023-01-01

## 2023-01-01 RX ORDER — SODIUM CHLORIDE 9 MG/ML
1000 INJECTION, SOLUTION INTRAVENOUS
Refills: 0 | Status: DISCONTINUED | OUTPATIENT
Start: 2023-01-01 | End: 2023-01-01

## 2023-01-01 RX ORDER — ONDANSETRON 8 MG/1
4 TABLET, FILM COATED ORAL EVERY 8 HOURS
Refills: 0 | Status: DISCONTINUED | OUTPATIENT
Start: 2023-01-01 | End: 2023-01-01

## 2023-01-01 RX ORDER — PHENOBARBITAL 60 MG
100 TABLET ORAL
Refills: 0 | Status: DISCONTINUED | OUTPATIENT
Start: 2023-01-01 | End: 2023-01-01

## 2023-01-01 RX ORDER — CLONAZEPAM 1 MG
0 TABLET ORAL
Refills: 0 | DISCHARGE

## 2023-01-01 RX ORDER — PHENOBARBITAL 60 MG
97.2 TABLET ORAL
Refills: 0 | Status: DISCONTINUED | OUTPATIENT
Start: 2023-01-01 | End: 2023-01-01

## 2023-01-01 RX ORDER — THIAMINE MONONITRATE (VIT B1) 100 MG
100 TABLET ORAL DAILY
Refills: 0 | Status: DISCONTINUED | OUTPATIENT
Start: 2023-01-01 | End: 2023-01-01

## 2023-01-01 RX ADMIN — Medication 390 MILLIGRAM(S): at 21:24

## 2023-01-01 RX ADMIN — Medication 105 MILLIGRAM(S): at 11:28

## 2023-01-01 RX ADMIN — Medication 1 MILLIGRAM(S): at 14:27

## 2023-01-01 RX ADMIN — QUETIAPINE FUMARATE 50 MILLIGRAM(S): 200 TABLET, FILM COATED ORAL at 14:21

## 2023-01-01 RX ADMIN — QUETIAPINE FUMARATE 50 MILLIGRAM(S): 200 TABLET, FILM COATED ORAL at 05:31

## 2023-01-01 RX ADMIN — Medication 1 MILLIGRAM(S): at 05:02

## 2023-01-01 RX ADMIN — Medication 25 MILLIGRAM(S): at 07:45

## 2023-01-01 RX ADMIN — QUETIAPINE FUMARATE 50 MILLIGRAM(S): 200 TABLET, FILM COATED ORAL at 17:57

## 2023-01-01 RX ADMIN — SODIUM CHLORIDE 1000 MILLILITER(S): 9 INJECTION, SOLUTION INTRAVENOUS at 03:18

## 2023-01-01 RX ADMIN — Medication 40 MILLIEQUIVALENT(S): at 10:13

## 2023-01-01 RX ADMIN — Medication 2 MILLIGRAM(S): at 06:16

## 2023-01-01 RX ADMIN — ENOXAPARIN SODIUM 40 MILLIGRAM(S): 100 INJECTION SUBCUTANEOUS at 14:22

## 2023-01-01 RX ADMIN — Medication 1 MILLIGRAM(S): at 12:44

## 2023-01-01 RX ADMIN — Medication 105 MILLIGRAM(S): at 00:04

## 2023-01-01 RX ADMIN — Medication 650 MILLIGRAM(S): at 16:56

## 2023-01-01 RX ADMIN — QUETIAPINE FUMARATE 50 MILLIGRAM(S): 200 TABLET, FILM COATED ORAL at 10:16

## 2023-01-01 RX ADMIN — Medication 4 MILLIGRAM(S): at 11:06

## 2023-01-01 RX ADMIN — Medication 25 MILLIGRAM(S): at 05:41

## 2023-01-01 RX ADMIN — Medication 3 MILLIGRAM(S): at 14:27

## 2023-01-01 RX ADMIN — Medication 50 MILLIGRAM(S): at 20:19

## 2023-01-01 RX ADMIN — ENOXAPARIN SODIUM 40 MILLIGRAM(S): 100 INJECTION SUBCUTANEOUS at 14:20

## 2023-01-01 RX ADMIN — Medication 25 MILLIGRAM(S): at 05:03

## 2023-01-01 RX ADMIN — Medication 25 MILLIGRAM(S): at 13:45

## 2023-01-01 RX ADMIN — Medication 40 MILLIEQUIVALENT(S): at 14:57

## 2023-01-01 RX ADMIN — SODIUM CHLORIDE 75 MILLILITER(S): 9 INJECTION, SOLUTION INTRAVENOUS at 07:14

## 2023-01-01 RX ADMIN — Medication 105 MILLIGRAM(S): at 14:21

## 2023-01-01 RX ADMIN — QUETIAPINE FUMARATE 50 MILLIGRAM(S): 200 TABLET, FILM COATED ORAL at 01:48

## 2023-01-01 RX ADMIN — Medication 1 MILLIGRAM(S): at 21:24

## 2023-01-01 RX ADMIN — QUETIAPINE FUMARATE 50 MILLIGRAM(S): 200 TABLET, FILM COATED ORAL at 05:52

## 2023-01-01 RX ADMIN — Medication 3 MILLIGRAM(S): at 18:17

## 2023-01-01 RX ADMIN — Medication 25 MILLIGRAM(S): at 06:16

## 2023-01-01 RX ADMIN — ENOXAPARIN SODIUM 40 MILLIGRAM(S): 100 INJECTION SUBCUTANEOUS at 14:58

## 2023-01-01 RX ADMIN — Medication 1 TABLET(S): at 14:22

## 2023-01-01 RX ADMIN — QUETIAPINE FUMARATE 50 MILLIGRAM(S): 200 TABLET, FILM COATED ORAL at 17:56

## 2023-01-01 RX ADMIN — Medication 3 MILLIGRAM(S): at 06:26

## 2023-01-01 RX ADMIN — Medication 4 MILLIGRAM(S): at 22:41

## 2023-01-01 RX ADMIN — QUETIAPINE FUMARATE 50 MILLIGRAM(S): 200 TABLET, FILM COATED ORAL at 03:18

## 2023-01-01 RX ADMIN — Medication 40 MILLIEQUIVALENT(S): at 17:58

## 2023-01-01 RX ADMIN — Medication 2 MILLIGRAM(S): at 08:33

## 2023-01-01 RX ADMIN — Medication 3 MILLIGRAM(S): at 02:21

## 2023-01-01 RX ADMIN — Medication 2 MILLIGRAM(S): at 21:16

## 2023-01-01 RX ADMIN — Medication 105 MILLIGRAM(S): at 14:25

## 2023-01-01 RX ADMIN — Medication 1 TABLET(S): at 14:20

## 2023-01-01 RX ADMIN — Medication 2 MILLIGRAM(S): at 10:00

## 2023-01-01 RX ADMIN — Medication 100 MILLIGRAM(S): at 20:18

## 2023-01-01 RX ADMIN — Medication 1 MILLIGRAM(S): at 20:18

## 2023-01-01 RX ADMIN — Medication 1 MILLIGRAM(S): at 22:41

## 2023-01-01 RX ADMIN — SODIUM CHLORIDE 1000 MILLILITER(S): 9 INJECTION INTRAMUSCULAR; INTRAVENOUS; SUBCUTANEOUS at 20:19

## 2023-01-01 RX ADMIN — SODIUM CHLORIDE 1000 MILLILITER(S): 9 INJECTION INTRAMUSCULAR; INTRAVENOUS; SUBCUTANEOUS at 01:06

## 2023-01-01 RX ADMIN — Medication 2 MILLIGRAM(S): at 02:49

## 2023-01-01 RX ADMIN — Medication 260 MILLIGRAM(S): at 04:06

## 2023-01-01 RX ADMIN — QUETIAPINE FUMARATE 50 MILLIGRAM(S): 200 TABLET, FILM COATED ORAL at 02:13

## 2023-01-01 RX ADMIN — Medication 1 TABLET(S): at 11:25

## 2023-01-01 RX ADMIN — Medication 1 MILLIGRAM(S): at 01:41

## 2023-01-01 RX ADMIN — Medication 40 MILLIEQUIVALENT(S): at 14:24

## 2023-01-01 RX ADMIN — Medication 4 MILLIGRAM(S): at 07:18

## 2023-01-01 RX ADMIN — Medication 4 MILLIGRAM(S): at 15:20

## 2023-01-01 RX ADMIN — Medication 102 MILLIGRAM(S): at 11:07

## 2023-01-01 RX ADMIN — QUETIAPINE FUMARATE 50 MILLIGRAM(S): 200 TABLET, FILM COATED ORAL at 18:17

## 2023-01-01 RX ADMIN — Medication 2 MILLIGRAM(S): at 13:18

## 2023-01-01 RX ADMIN — Medication 40 MILLIEQUIVALENT(S): at 10:16

## 2023-01-01 RX ADMIN — Medication 2 MILLIGRAM(S): at 14:22

## 2023-01-01 RX ADMIN — Medication 1 MILLIGRAM(S): at 10:02

## 2023-01-01 RX ADMIN — QUETIAPINE FUMARATE 50 MILLIGRAM(S): 200 TABLET, FILM COATED ORAL at 14:58

## 2023-01-01 RX ADMIN — Medication 3 MILLIGRAM(S): at 10:23

## 2023-01-01 RX ADMIN — QUETIAPINE FUMARATE 50 MILLIGRAM(S): 200 TABLET, FILM COATED ORAL at 21:27

## 2023-01-01 RX ADMIN — QUETIAPINE FUMARATE 50 MILLIGRAM(S): 200 TABLET, FILM COATED ORAL at 10:14

## 2023-01-01 RX ADMIN — Medication 260 MILLIGRAM(S): at 01:08

## 2023-01-01 RX ADMIN — Medication 40 MILLIEQUIVALENT(S): at 18:23

## 2023-01-01 RX ADMIN — QUETIAPINE FUMARATE 50 MILLIGRAM(S): 200 TABLET, FILM COATED ORAL at 05:41

## 2023-01-01 RX ADMIN — Medication 4 MILLIGRAM(S): at 01:54

## 2023-01-01 RX ADMIN — Medication 97.2 MILLIGRAM(S): at 18:15

## 2023-01-01 RX ADMIN — SODIUM CHLORIDE 1000 MILLILITER(S): 9 INJECTION, SOLUTION INTRAVENOUS at 00:04

## 2023-01-01 RX ADMIN — Medication 4 MILLIGRAM(S): at 18:32

## 2023-01-01 RX ADMIN — QUETIAPINE FUMARATE 50 MILLIGRAM(S): 200 TABLET, FILM COATED ORAL at 01:51

## 2023-01-01 RX ADMIN — Medication 64.8 MILLIGRAM(S): at 05:41

## 2023-01-01 RX ADMIN — QUETIAPINE FUMARATE 50 MILLIGRAM(S): 200 TABLET, FILM COATED ORAL at 10:13

## 2023-01-01 RX ADMIN — Medication 2 MILLIGRAM(S): at 17:50

## 2023-01-01 RX ADMIN — QUETIAPINE FUMARATE 50 MILLIGRAM(S): 200 TABLET, FILM COATED ORAL at 22:56

## 2023-01-01 RX ADMIN — Medication 97.2 MILLIGRAM(S): at 06:30

## 2023-01-01 RX ADMIN — Medication 1 MILLIGRAM(S): at 13:48

## 2023-01-01 RX ADMIN — Medication 102 MILLIGRAM(S): at 11:50

## 2023-01-01 RX ADMIN — Medication 3 MILLIGRAM(S): at 22:09

## 2023-01-01 RX ADMIN — Medication 25 MILLIGRAM(S): at 06:31

## 2023-01-01 RX ADMIN — Medication 102 MILLIGRAM(S): at 14:39

## 2023-01-01 RX ADMIN — Medication 97.2 MILLIGRAM(S): at 07:13

## 2023-01-01 RX ADMIN — QUETIAPINE FUMARATE 50 MILLIGRAM(S): 200 TABLET, FILM COATED ORAL at 22:02

## 2023-01-01 RX ADMIN — Medication 25 MILLIGRAM(S): at 06:26

## 2023-01-01 RX ADMIN — Medication 1 MILLIGRAM(S): at 14:22

## 2023-01-01 RX ADMIN — Medication 97.2 MILLIGRAM(S): at 17:56

## 2023-01-01 RX ADMIN — Medication 102 MILLIGRAM(S): at 12:45

## 2023-01-01 RX ADMIN — QUETIAPINE FUMARATE 50 MILLIGRAM(S): 200 TABLET, FILM COATED ORAL at 11:25

## 2023-01-01 RX ADMIN — Medication 25 MILLIGRAM(S): at 05:52

## 2023-08-30 NOTE — CHART NOTE - NSCHARTNOTEFT_GEN_A_CORE
Phenobarbital protocol per [facility] [place] (for additional details see Chuck et al., [employer] 2019, PMID: [id number]):   - Give (4mg/kg IBW) mg IM Phenobarbital NOW   - 3 hours after first administration, give (3 mg/kg IBW) mg IM Phenobarb   - 3 hours after second administration, give (3 mg/kg IBW) mg IM Phenobarb   - 5 hours post this 10mg/kg loading dose, check a phenobarb level.  If <15 ug/kg then give an additional 130mg IV phenobarb.   - Day 2:  Give phenobarb PO, (~IBWx2) 97.2mg q12h   - Day 3: as day 2.  Give 97.2mg q12h   - Day 4: Give 64.8mg q12h   - Day 5: Give 64.8mg q12h   - Day 6: Give 32.4mg q12h   - Day 7: Give 32.4mg q12h, then discontinue   - NO BENZODIAZEPINES on this protocol.  Place Provider to RN "no benzodiazepines"   - Vitals q4h   - No PRN benzos, no PRN barbiturates; this is not a CIWA protocol   - Seroquel 50mg PO for anxiety q4h   - Haldol 5mg PRN agitation q4h IM; 1mg IV q4h is also acceptable (please be sure to check QTc prior to administration)    - If vitals unstable, primary team can consider treatment with beta blockers    Isai Presley MD PGY-3

## 2023-08-30 NOTE — ED PROVIDER NOTE - PROGRESS NOTE DETAILS
Isai Presley MD PGY-3  Pt still with anxiety, tachycardia. Tremulousness improved.  - Next dose of Phenobarb 260mg || TOTAL: 650mg || GOAL: for IBW 77kg = [id number]  - no CIWA, no benzos

## 2023-08-30 NOTE — ED ADULT TRIAGE NOTE - CHIEF COMPLAINT QUOTE
Pt c/o feeling anxious with headache and is concerned for alcohol withdrawals. States he is a daily drinker, drank 1.5 bottles of wine this morning but has not drank since. Pt is tremulous, nauseous. Denies visual or auditory hallucinations, vomiting, CP, SOB. Non diaphoretic. No hx of EtOH withdrawal seizures.

## 2023-08-30 NOTE — ED PROVIDER NOTE - ATTENDING CONTRIBUTION TO CARE
I have personally performed a history and physical examination of the patient and discussed management with the resident as well as the patient.  I reviewed the resident's note and agree with the documented findings and plan of care.  I have authored and modified critical sections of the Provider Note, including but not limited to HPI, Physical Exam and MDM.    42M with H/O of alcohol use disorder complicated by alcohol withdrawal (he has had alcohol withdrawal seizures and ICU admission in the past) who presents after a weeklong "binge" of alcohol.  CIWA equals 10 on arrival.  Will obtain baseline metabolic evaluation.  Shortness of breath treatment for alcohol withdrawal.  History of withdrawal seizures requiring ICU admission in the past.    will admit patient for prior to will admit patient for further treatment and disposition planning for alcohol withdrawals.

## 2023-08-30 NOTE — ED PROVIDER NOTE - PHYSICAL EXAMINATION
GEN: Awake, AOx3, NAD.  HEENT: NCAT  ---EYES: no scleral icterus, EOMI, PERRLA  ---MOUTH: tongue black on keratinized surface  CARDIO: RRR. Normal S1/S2, no m/r/g. No JVD.  RESP: Normal respiratory effort  ABD: Soft, NTND.   MSK: No obvious deformity or ROM deficit.   SKIN: Warm, dry.   NEURO: Moves all four extremities spontaneously  PSYCH: Appropriate mood & affect.

## 2023-08-30 NOTE — ED ADULT NURSE NOTE - NSFALLHARMRISKINTERV_ED_ALL_ED
Assistance OOB with selected safe patient handling equipment if applicable/Assistance with ambulation/Communicate risk of Fall with Harm to all staff, patient, and family/Monitor gait and stability/Monitor for mental status changes and reorient to person, place, and time, as needed/Provide visual cue: red socks, yellow wristband, yellow gown, etc/Reinforce activity limits and safety measures with patient and family/Toileting schedule using arm’s reach rule for commode and bathroom/Use of alarms - bed, stretcher, chair and/or video monitoring/Bed in lowest position, wheels locked, appropriate side rails in place/Call bell, personal items and telephone in reach/Instruct patient to call for assistance before getting out of bed/chair/stretcher/Non-slip footwear applied when patient is off stretcher/[place] to call system/Physically safe environment - no spills, clutter or unnecessary equipment/Purposeful Proactive Rounding/Room/bathroom lighting operational, light cord in reach

## 2023-08-30 NOTE — ED PROVIDER NOTE - OBJECTIVE STATEMENT
42M with H/O of alcohol use disorder complicated by alcohol withdrawal (he has had alcohol withdrawal seizures and ICU admission in the past) who presents after a weeklong "binge" of alcohol.  He has been drinking 2 bottles of wine and sixpack of beer daily for the last week. prior to that he was also drinking significantly as well.  He had maintained sobriety for several years prior to the last month, but his anxiety has been very severe and he relapsed.  He is describing severe anxiety, gagging, shakiness, weakness.  He denies any chest pain, shortness of breath, nausea/vomiting, numbness or tingling in his hands or feet.

## 2023-08-30 NOTE — ED PROVIDER NOTE - CLINICAL SUMMARY MEDICAL DECISION MAKING FREE TEXT BOX
42 M with AUD with history of severe alcohol withdrawal who presents requesting assistance with monitoring of alcohol withdrawal and connection with resources for maintenance of sobriety.  Given patient's history of high risk alcohol withdrawal and the risk for severe alcohol withdrawal even with a short-term relapse, will administer phenobarbital weight-based (approximately 4 mg/kg of IBW according to protocol to see)  - Labs  - Phenobarb per protocols  - IVF, ECG  - Thiamine, Folate (incl levels)  - b12 level to assess for deficiency ico lower extermity weakness, etoh use (?SCD)  - TBA for EtOH w/d

## 2023-08-31 PROBLEM — F10.10 ALCOHOL ABUSE, UNCOMPLICATED: Chronic | Status: ACTIVE | Noted: 2022-09-25

## 2023-08-31 NOTE — H&P ADULT - PROBLEM SELECTOR PLAN 3
Due to ethanol withdrawal  Reviewed prior CT head for similar symptoms iso EtOH withdrawal   Tylenol PRN

## 2023-08-31 NOTE — H&P ADULT - PROBLEM SELECTOR PLAN 1
Pt already loaded with Phenobarbital while in ED;  Phenobarbital 390mg IM x 1 dose AND 260mg IM x 2 doses (Total=910mg); Last dose at 4:00AM, 8/31  No Benzodiazapine were administered prior;   Initial CIWA per ED =10    High risk for DTs per prior hx of withdrawal requiring MICU admission  MICU consulted for assistance with Phenobarbital taper/ maintenance    No Benzodiazepines order to RN   CIWA without standing or PRN Benzodiazepines, for reporting and scoring only, ordered   Thiamine 500mg daily IVPB Pt already loaded with Phenobarbital while in ED;  Phenobarbital 390mg IM x 1 dose AND 260mg IM x 2 doses (Total=910mg); Last dose at 4:00AM, 8/31  No Benzodiazapine were administered prior;   Initial CIWA per ED =10    High risk for DTs per prior hx of withdrawal requiring MICU admission  MICU consulted for assistance with Phenobarbital taper/ maintenance  (pending)  No Benzodiazepines order to RN submitted    CIWA without standing or PRN Benzodiazepines, for monitoring/ scoring only, ordered   Thiamine 500mg daily IVPB  Haldol PRN IM for agitation  Seroquel q12h, may need PRN anxiety Pt already loaded with Phenobarbital while in ED;  Phenobarbital 390mg IM x 1 dose AND 260mg IM x 2 doses (Total=910mg); Last dose at 4:00AM, 8/31  No Benzodiazapine were administered prior;   Initial CIWA per ED =10    High risk for DTs per prior hx of withdrawal requiring MICU admission  MICU consulted for assistance with Phenobarbital taper/ maintenance  (pending)  No Benzodiazepines order to RN submitted    CIWA without standing or PRN Benzodiazepines, for monitoring/ scoring only, ordered   Thiamine 500mg daily IVPB  Haldol PRN IM for agitation  Seroquel q12h, may need PRN anxiety  Social work c/s for assistance with alcohol use cessation program Pt already loaded with Phenobarbital while in ED;  Phenobarbital 390mg IM x 1 dose AND 260mg IM x 2 doses (Total=910mg); Last dose at 4:00AM, 8/31  No Benzodiazapine were administered prior;   Initial CIWA per ED =10    High risk for DTs per prior hx of withdrawal requiring MICU admission  MICU consulted for assistance with Phenobarbital taper/ maintenance  (pending)  No Benzodiazepines order to RN submitted    CIWA without standing or PRN Benzodiazepines, for monitoring/ scoring only, ordered   Thiamine 500mg daily IVPB  Haldol PRN IM for agitation  Seroquel q12h, may need PRN anxiety  Social work c/s for assistance with alcohol use cessation program  Screening EKG, [id number]  Monitor EKG for QTc prolongation daily

## 2023-08-31 NOTE — PATIENT PROFILE ADULT - NSPRESCRALCFREQ_GEN_A_NUR
42M with known back pain / sciatic and herniated disc L4-L5     PLAN   - No acute neurosurgical intervention   - Patient would not like to have surgery and would like to try conservative management   - Can DC patient with Medrol dose pack   - Can try epidural steriod injections   - PT/OT   - If conservative management does not help or improve symptoms, patient can follow up with Dr. Amaya outpatient please have patient call to make an appointment   - Discussed case with attending 
4 or more times a week

## 2023-08-31 NOTE — CONSULT NOTE ADULT - SUBJECTIVE AND OBJECTIVE BOX
43yo male with MHx of anxiety (on Rx Klonopin), HTN, alcohol use disorder complicated by alcohol withdrawal seizures and ICU admission in the past); Pt reports week-long "binge" of alcohol.  States that has been drinking 3 bottles of wine and six-pack of beer daily for the past  appx 3 weeks.  He was able to maintain sobriety for several years prior to the last month, but his anxiety has been very severe and he relapsed. He is reporting severe anxiety specifically gagging, shakiness, gen weakness. Reports also severe generalized weakness attributed to not eating for several days due to overconsumption of alcohol Otherwise reports no falls, vertigo, chest pain, shortness of breath, nausea/vomiting, numbness or tingling in his hands or feet.  Of note, states that does not take Klonopin when uses alcohol, last Klonopin dose 1-2 weeks ago (no withdrawal seizures). States that ran out of Metoprolol a few weeks ago. Report having "black" discoloration of tongue, states that has not taken/ eaten medical (activated) charcoal.     ED course: CIWA=10 on arrival, s/p Phenobarbital 390mg IM x 1 dose AND 260mg IM x 2 doses (total=910mg); Thiamine IVPB 500mg x 1 dose, 2L of LR IV    MICU consulted for phenobarbital dosing recommendations.     CHIEF COMPLAINT:    HPI:    PAST MEDICAL & SURGICAL HISTORY:  Alcohol Withdrawal      Alcohol-Induced Pancreatitis      ETOH abuse      No significant past surgical history          FAMILY HISTORY:  FH: lung cancer (Grandparent)          Allergies    No Known Allergies    Intolerances        HOME MEDICATIONS:    REVIEW OF SYSTEMS:  Constitutional:   Eyes:  ENT:  CV:  Resp:  GI:  :  MSK:  Integumentary:  Neurological:  Psychiatric:  Endocrine:  Hematologic/Lymphatic:  Allergic/Immunologic:  [ ] All other systems negative  [ ] Unable to assess ROS because ________    OBJECTIVE:  ICU Vital Signs Last 24 Hrs  T(C): 36.8 (31 Aug 2023 03:04), Max: 37 (30 Aug 2023 22:51)  T(F): 98.2 (31 Aug 2023 03:04), Max: 98.6 (30 Aug 2023 22:51)  HR: 84 (31 Aug 2023 03:04) (84 - 148)  BP: 130/77 (31 Aug 2023 03:04) (117/74 - 130/77)  BP(mean): --  ABP: --  ABP(mean): --  RR: 18 (31 Aug 2023 03:04) (18 - 22)  SpO2: 99% (31 Aug 2023 03:04) (96% - 100%)    O2 Parameters below as of 31 Aug 2023 03:04  Patient On (Oxygen Delivery Method): room air              CAPILLARY BLOOD GLUCOSE      POCT Blood Glucose.: 172 mg/dL (30 Aug 2023 19:34)      PHYSICAL EXAM:  General:   HEENT:   Lymph Nodes:  Neck:   Respiratory:   Cardiovascular:   Abdomen:   Extremities:   Skin:   Neurological:  Psychiatry:    HOSPITAL MEDICATIONS:  MEDICATIONS  (STANDING):  dextrose 5% + sodium chloride 0.45% 1000 milliLiter(s) (75 mL/Hr) IV Continuous <Continuous>  enoxaparin Injectable 40 milliGRAM(s) SubCutaneous every 24 hours  folic acid 1 milliGRAM(s) Oral daily  metoprolol succinate ER 25 milliGRAM(s) Oral daily  multivitamin 1 Tablet(s) Oral daily  QUEtiapine 25 milliGRAM(s) Oral every 12 hours  thiamine IVPB 500 milliGRAM(s) IV Intermittent every 24 hours    MEDICATIONS  (PRN):  acetaminophen     Tablet .. 650 milliGRAM(s) Oral every 6 hours PRN Temp greater or equal to 38C (100.4F), Mild Pain (1 - 3)  aluminum hydroxide/magnesium hydroxide/simethicone Suspension 30 milliLiter(s) Oral every 4 hours PRN Dyspepsia  melatonin 3 milliGRAM(s) Oral at bedtime PRN Insomnia  ondansetron Injectable 4 milliGRAM(s) IV Push every 8 hours PRN Nausea and/or Vomiting      LABS:                        15.0   5.32  )-----------( 85       ( 30 Aug 2023 21:37 )             42.4     08-30    138  |  92<L>  |  11  ----------------------------<  71  3.5   |  26  |  0.82    Ca    9.6      30 Aug 2023 23:58  Phos  3.3     08-30  Mg     2.00     08-30    TPro  8.5<H>  /  Alb  4.5  /  TBili  0.8  /  DBili  x   /  AST  214<H>  /  ALT  184<H>  /  AlkPhos  88  08-30    PT/INR - ( 30 Aug 2023 23:58 )   PT: 9.1 sec;   INR: <0.90 ratio         PTT - ( 30 Aug 2023 23:58 )  PTT:26.2 sec  Urinalysis Basic - ( 30 Aug 2023 23:58 )    Color: x / Appearance: x / SG: x / pH: x  Gluc: 71 mg/dL / Ketone: x  / Bili: x / Urobili: x   Blood: x / Protein: x / Nitrite: x   Leuk Esterase: x / RBC: x / WBC x   Sq Epi: x / Non Sq Epi: x / Bacteria: x            MICROBIOLOGY:     RADIOLOGY:  [ ] Reviewed and interpreted by me    EKG: 43yo male with MHx of anxiety (on Rx Klonopin), HTN, alcohol use disorder complicated by alcohol withdrawal seizures and ICU admission in the past); Pt reports week-long "binge" of alcohol.  States that has been drinking 3 bottles of wine and six-pack of beer daily for the past  appx 3 weeks.  He was able to maintain sobriety for several years prior to the last month, but his anxiety has been very severe and he relapsed. He is reporting severe anxiety specifically gagging, shakiness, gen weakness. Reports also severe generalized weakness attributed to not eating for several days due to overconsumption of alcohol Otherwise reports no falls, vertigo, chest pain, shortness of breath, nausea/vomiting, numbness or tingling in his hands or feet.  Of note, states that does not take Klonopin when uses alcohol, last Klonopin dose 1-2 weeks ago (no withdrawal seizures). States that ran out of Metoprolol a few weeks ago. Report having "black" discoloration of tongue, states that has not taken/ eaten medical (activated) charcoal.     ED course: CIWA=10 on arrival, s/p Phenobarbital 390mg IM x 1 dose AND 260mg IM x 2 doses (total=910mg); Thiamine IVPB 500mg x 1 dose, 2L of LR IV    MICU consulted for phenobarbital dosing recommendations. Patient stable. Reports anxiety and headaches.     CHIEF COMPLAINT:    HPI:    PAST MEDICAL & SURGICAL HISTORY:  Alcohol Withdrawal      Alcohol-Induced Pancreatitis      ETOH abuse      No significant past surgical history          FAMILY HISTORY:  FH: lung cancer (Grandparent)          Allergies    No Known Allergies    Intolerances        HOME MEDICATIONS:    REVIEW OF SYSTEMS:  Constitutional:   Eyes:  ENT:  CV:  Resp:  GI:  :  MSK:  Integumentary:  Neurological:  Psychiatric:  Endocrine:  Hematologic/Lymphatic:  Allergic/Immunologic:  [ ] All other systems negative  [ ] Unable to assess ROS because ________    OBJECTIVE:  ICU Vital Signs Last 24 Hrs  T(C): 36.8 (31 Aug 2023 03:04), Max: 37 (30 Aug 2023 22:51)  T(F): 98.2 (31 Aug 2023 03:04), Max: 98.6 (30 Aug 2023 22:51)  HR: 84 (31 Aug 2023 03:04) (84 - 148)  BP: 130/77 (31 Aug 2023 03:04) (117/74 - 130/77)  BP(mean): --  ABP: --  ABP(mean): --  RR: 18 (31 Aug 2023 03:04) (18 - 22)  SpO2: 99% (31 Aug 2023 03:04) (96% - 100%)    O2 Parameters below as of 31 Aug 2023 03:04  Patient On (Oxygen Delivery Method): room air              CAPILLARY BLOOD GLUCOSE      POCT Blood Glucose.: 172 mg/dL (30 Aug 2023 19:34)      PHYSICAL EXAM:  General:   HEENT:   Lymph Nodes:  Neck:   Respiratory:   Cardiovascular:   Abdomen:   Extremities:   Skin:   Neurological:  Psychiatry:    HOSPITAL MEDICATIONS:  MEDICATIONS  (STANDING):  dextrose 5% + sodium chloride 0.45% 1000 milliLiter(s) (75 mL/Hr) IV Continuous <Continuous>  enoxaparin Injectable 40 milliGRAM(s) SubCutaneous every 24 hours  folic acid 1 milliGRAM(s) Oral daily  metoprolol succinate ER 25 milliGRAM(s) Oral daily  multivitamin 1 Tablet(s) Oral daily  QUEtiapine 25 milliGRAM(s) Oral every 12 hours  thiamine IVPB 500 milliGRAM(s) IV Intermittent every 24 hours    MEDICATIONS  (PRN):  acetaminophen     Tablet .. 650 milliGRAM(s) Oral every 6 hours PRN Temp greater or equal to 38C (100.4F), Mild Pain (1 - 3)  aluminum hydroxide/magnesium hydroxide/simethicone Suspension 30 milliLiter(s) Oral every 4 hours PRN Dyspepsia  melatonin 3 milliGRAM(s) Oral at bedtime PRN Insomnia  ondansetron Injectable 4 milliGRAM(s) IV Push every 8 hours PRN Nausea and/or Vomiting      LABS:                        15.0   5.32  )-----------( 85       ( 30 Aug 2023 21:37 )             42.4     08-30    138  |  92<L>  |  11  ----------------------------<  71  3.5   |  26  |  0.82    Ca    9.6      30 Aug 2023 23:58  Phos  3.3     08-30  Mg     2.00     08-30    TPro  8.5<H>  /  Alb  4.5  /  TBili  0.8  /  DBili  x   /  AST  214<H>  /  ALT  184<H>  /  AlkPhos  88  08-30    PT/INR - ( 30 Aug 2023 23:58 )   PT: 9.1 sec;   INR: <0.90 ratio         PTT - ( 30 Aug 2023 23:58 )  PTT:26.2 sec  Urinalysis Basic - ( 30 Aug 2023 23:58 )    Color: x / Appearance: x / SG: x / pH: x  Gluc: 71 mg/dL / Ketone: x  / Bili: x / Urobili: x   Blood: x / Protein: x / Nitrite: x   Leuk Esterase: x / RBC: x / WBC x   Sq Epi: x / Non Sq Epi: x / Bacteria: x            MICROBIOLOGY:     RADIOLOGY:  [ ] Reviewed and interpreted by me    EKG: 43yo male with MHx of anxiety (on Rx Klonopin), HTN, alcohol use disorder complicated by alcohol withdrawal seizures and ICU admission in the past); Pt reports week-long "binge" of alcohol.  States that has been drinking 3 bottles of wine and six-pack of beer daily for the past  appx 3 weeks.  He was able to maintain sobriety for several years prior to the last month, but his anxiety has been very severe and he relapsed. He is reporting severe anxiety specifically gagging, shakiness, gen weakness. Reports also severe generalized weakness attributed to not eating for several days due to overconsumption of alcohol Otherwise reports no falls, vertigo, chest pain, shortness of breath, nausea/vomiting, numbness or tingling in his hands or feet.  Of note, states that does not take Klonopin when uses alcohol, last Klonopin dose 1-2 weeks ago (no withdrawal seizures). States that ran out of Metoprolol a few weeks ago. Report having "black" discoloration of tongue, states that has not taken/ eaten medical (activated) charcoal.     ED course: CIWA=10 on arrival, s/p Phenobarbital 390mg IM x 1 dose AND 260mg IM x 2 doses (total=910mg); Thiamine IVPB 500mg x 1 dose, 2L of LR IV    MICU consulted for phenobarbital dosing recommendations. Patient stable. Reports anxiety and headaches.     CHIEF COMPLAINT:    HPI:    PAST MEDICAL & SURGICAL HISTORY:  Alcohol Withdrawal      Alcohol-Induced Pancreatitis      ETOH abuse      No significant past surgical history          FAMILY HISTORY:  FH: lung cancer (Grandparent)          Allergies    No Known Allergies    Intolerances        HOME MEDICATIONS:    REVIEW OF SYSTEMS:  + nausea, headahce, anxiety    OBJECTIVE:  ICU Vital Signs Last 24 Hrs  T(C): 36.8 (31 Aug 2023 03:04), Max: 37 (30 Aug 2023 22:51)  T(F): 98.2 (31 Aug 2023 03:04), Max: 98.6 (30 Aug 2023 22:51)  HR: 84 (31 Aug 2023 03:04) (84 - 148)  BP: 130/77 (31 Aug 2023 03:04) (117/74 - 130/77)  BP(mean): --  ABP: --  ABP(mean): --  RR: 18 (31 Aug 2023 03:04) (18 - 22)  SpO2: 99% (31 Aug 2023 03:04) (96% - 100%)    O2 Parameters below as of 31 Aug 2023 03:04  Patient On (Oxygen Delivery Method): room air              CAPILLARY BLOOD GLUCOSE      POCT Blood Glucose.: 172 mg/dL (30 Aug 2023 19:34)      PHYSICAL EXAM:   General: NAD  HEENT: NC/AT  Neck: Supple  Respiratory: CTAB  Cardiovascular: RRR  Abdomen: soft and nondistended  Neurological: nonfocal  Psychiatry: AA03; anxious     HOSPITAL MEDICATIONS:  MEDICATIONS  (STANDING):  dextrose 5% + sodium chloride 0.45% 1000 milliLiter(s) (75 mL/Hr) IV Continuous <Continuous>  enoxaparin Injectable 40 milliGRAM(s) SubCutaneous every 24 hours  folic acid 1 milliGRAM(s) Oral daily  metoprolol succinate ER 25 milliGRAM(s) Oral daily  multivitamin 1 Tablet(s) Oral daily  QUEtiapine 25 milliGRAM(s) Oral every 12 hours  thiamine IVPB 500 milliGRAM(s) IV Intermittent every 24 hours    MEDICATIONS  (PRN):  acetaminophen     Tablet .. 650 milliGRAM(s) Oral every 6 hours PRN Temp greater or equal to 38C (100.4F), Mild Pain (1 - 3)  aluminum hydroxide/magnesium hydroxide/simethicone Suspension 30 milliLiter(s) Oral every 4 hours PRN Dyspepsia  melatonin 3 milliGRAM(s) Oral at bedtime PRN Insomnia  ondansetron Injectable 4 milliGRAM(s) IV Push every 8 hours PRN Nausea and/or Vomiting      LABS:                        15.0   5.32  )-----------( 85       ( 30 Aug 2023 21:37 )             42.4     08-30    138  |  92<L>  |  11  ----------------------------<  71  3.5   |  26  |  0.82    Ca    9.6      30 Aug 2023 23:58  Phos  3.3     08-30  Mg     2.00     08-30    TPro  8.5<H>  /  Alb  4.5  /  TBili  0.8  /  DBili  x   /  AST  214<H>  /  ALT  184<H>  /  AlkPhos  88  08-30    PT/INR - ( 30 Aug 2023 23:58 )   PT: 9.1 sec;   INR: <0.90 ratio         PTT - ( 30 Aug 2023 23:58 )  PTT:26.2 sec  Urinalysis Basic - ( 30 Aug 2023 23:58 )    Color: x / Appearance: x / SG: x / pH: x  Gluc: 71 mg/dL / Ketone: x  / Bili: x / Urobili: x   Blood: x / Protein: x / Nitrite: x   Leuk Esterase: x / RBC: x / WBC x   Sq Epi: x / Non Sq Epi: x / Bacteria: x            MICROBIOLOGY:     RADIOLOGY:  [ ] Reviewed and interpreted by me    EKG:

## 2023-08-31 NOTE — H&P ADULT - PROBLEM SELECTOR PLAN 2
Due to poor oral intake iso EtOH overuse and likely alcoholic ketoacidosis given low serum bicarb   -check phosphorus   -check CPK r/o rhabdo  -check TSH  -started D5W + 1/2NS  -Aspiration and Fall precautions Due to poor oral intake iso EtOH overuse and likely alcoholic ketoacidosis given low serum bicarb   -check phosphorus   -check CPK r/o rhabdo  -check TSH  -started D5W + 1/2NS (s/p Thiamine supp)  -Aspiration and Fall precautions

## 2023-08-31 NOTE — CONSULT NOTE ADULT - ATTENDING COMMENTS
43yo male with MHx of anxiety (on Rx Klonopin), HTN, alcohol use disorder complicated by alcohol withdrawal seizures and ICU admission in the past) p/w general weakness in the setting of alcohol dependence and withdrawal, high risk for DTs. MICU consulted for phenobarbital dosing.   patient phenobarb loaded in the ER, MICU consulted for phenobarb taper  avoid benzos while using phenobarb, vitals q4-6  haldol, and seroquel prn  (4mg/kg IBW) mg IM Phenobarbital   3 hours after first administration, give (3 mg/kg IBW) mg IM Phenobarb   3 hours after second administration, give (3 mg/kg IBW) mg IM Phenobarb   5 hours post this 10mg/kg loading dose, check a phenobarb level.  If <15 ug/kg then give an additional 130mg IV phenobarb.   Day 2:  Give phenobarb PO, (~IBWx2) 97.2mg q12h   - Day 3: as day 2.  Give 97.2mg q12h   - Day 4: Give 64.8mg q12h   - Day 5: Give 64.8mg q12h   - Day 6: Give 32.4mg q12h   - Day 7: Give 32.4mg q12h, then discontinue    reconsult as needed

## 2023-08-31 NOTE — PATIENT PROFILE ADULT - FALL HARM RISK - HARM RISK INTERVENTIONS
Assistance with ambulation/Assistance OOB with selected safe patient handling equipment/Communicate Risk of Fall with Harm to all staff/Discuss with provider need for PT consult/Monitor for mental status changes/Monitor gait and stability/Reinforce activity limits and safety measures with patient and family/Tailored Fall Risk Interventions/Toileting schedule using arm’s reach rule for commode and bathroom/Use of alarms - bed, chair and/or voice tab/Visual Cue: Yellow wristband and red socks/Bed in lowest position, wheels locked, appropriate side rails in place/Call bell, personal items and telephone in reach/Instruct patient to call for assistance before getting out of bed or chair/Non-slip footwear when patient is out of bed/[place] to call system/Physically safe environment - no spills, clutter or unnecessary equipment/Purposeful Proactive Rounding/Room/bathroom lighting operational, light cord in reach

## 2023-08-31 NOTE — H&P ADULT - HISTORY OF PRESENT ILLNESS
43yo male with MHx of alcohol use disorder complicated by alcohol withdrawal seizures and ICU admission in the past); P reports week-long "binge" of alcohol.  States that has been drinking 2 bottles of wine and six-pack of beer daily for the last week.  He had maintained sobriety for several years prior to the last month, but his anxiety has been very severe and he relapsed. He is reporting severe anxiety specifically gagging, shakiness, gen weakness. Reports no chest pain, shortness of breath, nausea/vomiting, numbness or tingling in his hands or feet.    ED course: CIWA=10 on arrival, s/p Phenobarbital 390mg IM x 1 dose AND 260mg IM x 2 doses (total=910mg); Thiamine IVPB 500mg x 1 dose, 2L of LR IV 43yo male with MHx of anxiety (on Rx Klonopin), HTN, alcohol use disorder complicated by alcohol withdrawal seizures and ICU admission in the past); P reports week-long "binge" of alcohol.  States that has been drinking 2 bottles of wine and six-pack of beer daily for the last week.  He had maintained sobriety for several years prior to the last month, but his anxiety has been very severe and he relapsed. He is reporting severe anxiety specifically gagging, shakiness, gen weakness. Reports also severe generalized weakness attributed to not eating for several days due to overconsumption of alcohol Otherwise reports no falls, vertigo, chest pain, shortness of breath, nausea/vomiting, numbness or tingling in his hands or feet.  Of note, states that does not take Klonopin when uses alcohol, last Klonopin dose 1-2 weeks ago (no withdrawal seizures). States that ran out of Metoprolol a few weeks ago.     ED course: CIWA=10 on arrival, s/p Phenobarbital 390mg IM x 1 dose AND 260mg IM x 2 doses (total=910mg); Thiamine IVPB 500mg x 1 dose, 2L of LR IV 43yo male with MHx of anxiety (on Rx Klonopin), HTN, alcohol use disorder complicated by alcohol withdrawal seizures and ICU admission in the past); Pt reports week-long "binge" of alcohol.  States that has been drinking 3 bottles of wine and six-pack of beer daily for the past  appx 3 weeks.  He was able to maintain sobriety for several years prior to the last month, but his anxiety has been very severe and he relapsed. He is reporting severe anxiety specifically gagging, shakiness, gen weakness. Reports also severe generalized weakness attributed to not eating for several days due to overconsumption of alcohol Otherwise reports no falls, vertigo, chest pain, shortness of breath, nausea/vomiting, numbness or tingling in his hands or feet.  Of note, states that does not take Klonopin when uses alcohol, last Klonopin dose 1-2 weeks ago (no withdrawal seizures). States that ran out of Metoprolol a few weeks ago. Report having "black" discoloration of tongue, states that has not taken/ eaten medical (activated) charcoal.     ED course: CIWA=10 on arrival, s/p Phenobarbital 390mg IM x 1 dose AND 260mg IM x 2 doses (total=910mg); Thiamine IVPB 500mg x 1 dose, 2L of LR IV

## 2023-08-31 NOTE — H&P ADULT - PROBLEM SELECTOR PLAN 6
Lovenox sq [id number] Wine pigments including tannins likely responsible for dark brown discoloration of the tongue;    -Monitor/ examine daily

## 2023-08-31 NOTE — ED ADULT NURSE REASSESSMENT NOTE - NS ED NURSE REASSESS COMMENT FT1
Break coverage RN: TIMBO as noted. Pt alert and awake, oxygen saturation %, will medicate with another dose of phenobarb as per ordered. Pt USIV infiltrated. MD Presley made aware will placed new IV.

## 2023-08-31 NOTE — ED PROCEDURE NOTE - PROCEDURE ADDITIONAL DETAILS
Peripheral IV access in the Emergency Department obtained under dynamic ultrasound guidance with dark nonpulsatile blood return.  Catheter was flushed afterwards without any resistance or resulting extravasation.  IV catheter confirmed in compressible vein after insertion.

## 2023-08-31 NOTE — PATIENT PROFILE ADULT - DEAF OR HARD OF HEARING?
This patient has been assessed with a concern for Malnutrition and has been determined to have a diagnosis/diagnoses of Mild protein-calorie malnutrition.    This patient is being managed with:   Diet Regular - Pediatric-  Entered: Jun 14 2021 10:27AM     This patient has been assessed with a concern for Malnutrition and has been determined to have a diagnosis/diagnoses of Mild protein-calorie malnutrition.    This patient is being managed with:   Diet Clear Liquid - Pediatric-  Entered: Jun 14 2021  1:36AM     This patient has been assessed with a concern for Malnutrition and has been determined to have a diagnosis/diagnoses of Mild protein-calorie malnutrition.    This patient is being managed with:   Diet Clear Liquid - Pediatric-  Entered: Mohinder 10 2021  7:16AM     This patient has been assessed with a concern for Malnutrition and has been determined to have a diagnosis/diagnoses of Mild protein-calorie malnutrition.    This patient is being managed with:   Diet Regular - Pediatric-  Entered: Jun 11 2021  7:41AM     no

## 2023-08-31 NOTE — H&P ADULT - NSHPPHYSICALEXAM_GEN_ALL_CORE
ICU Vital Signs Last 24 Hrs  T(C): 36.8 (31 Aug 2023 03:04), Max: 37 (30 Aug 2023 22:51)  T(F): 98.2 (31 Aug 2023 03:04), Max: 98.6 (30 Aug 2023 22:51)  HR: 84 (31 Aug 2023 03:04) (84 - 148)  BP: 130/77 (31 Aug 2023 03:04) (117/74 - 130/77)  BP(mean): --  ABP: --  ABP(mean): --  RR: 18 (31 Aug 2023 03:04) (18 - 22)  SpO2: 99% (31 Aug 2023 03:04) (96% - 100%)    O2 Parameters below as of 31 Aug 2023 03:04  Patient On (Oxygen Delivery Method): room air

## 2023-08-31 NOTE — H&P ADULT - NS ATTEST RISK PROBLEM GEN_ALL_CORE FT
Alcohol dependence with withdrawal, high risk for DTs, requiring CIWA and Phenobarbital maintenance dosing; Alcohol dependence with withdrawal, high risk for DTs, requiring CIWA and Phenobarbital maintenance dosing;  MICU consulted;

## 2023-08-31 NOTE — ED PROCEDURE NOTE - ATTENDING CONTRIBUTION TO CARE
I was present in the department during the E/M service provided. I was in the department during the key portions of the service provided. LOY.
I have personally performed a history and physical examination of the patient and discussed management with the resident as well as the patient.  I reviewed the resident's note and agree with the documented findings and plan of care.  I have authored and modified critical sections of the Provider Note, including but not limited to HPI, Physical Exam and MDM.    Uncomplicated IV placement under US guidance.
I was present in the department during the E/M service provided. I was in the department during the key portions of the service provided. LOY.

## 2023-08-31 NOTE — H&P ADULT - NSHPLABSRESULTS_GEN_ALL_CORE
.    -Personally interpreted EKG:     -Personally reviewed the following labs below:                        15.0   5.32  )-----------( 85       ( 30 Aug 2023 21:37 )             42.4   08-30    138  |  92<L>  |  11  ----------------------------<  71  3.5   |  26  |  0.82    Ca    9.6      30 Aug 2023 23:58  Phos  3.3     08-30  Mg     2.00     08-30    TPro  8.5<H>  /  Alb  4.5  /  TBili  0.8  /  DBili  x   /  AST  214<H>  /  ALT  184<H>  /  AlkPhos  88  08-30      - Personally reviewed CT BRAIN dated  09/27/2022    INTERPRETATION: Clinical indication: Headache.  Multiple axial sections were performed from base of vertex without contrast enhancement. Coronal and sagittal destructions were performed.  Parenchymal volume loss is seen.  There is no acute hemorrhage mass or mass effect seen.  Evaluation of the osseous structures with the appropriate window appears normal  The visualized paranasal sinuses mastoid and middle ear regions appear clear.  IMPRESSION: No evidence of acute hemorrhage mass or mass effect .    -Personally interpreted EKG: Sinus tachycardia 131bpm, QTc 443, no acute Tw or ST changes, no acute Tw or ST changes, no PACs or PVCs     -Personally reviewed the following labs below:                        15.0   5.32  )-----------( 85       ( 30 Aug 2023 21:37 )             42.4   08-30    138  |  92<L>  |  11  ----------------------------<  71  3.5   |  26  |  0.82    Ca    9.6      30 Aug 2023 23:58  Phos  3.3     08-30  Mg     2.00     08-30    TPro  8.5<H>  /  Alb  4.5  /  TBili  0.8  /  DBili  x   /  AST  214<H>  /  ALT  184<H>  /  AlkPhos  88  08-30      - Personally reviewed CT BRAIN dated  09/27/2022    INTERPRETATION: Clinical indication: Headache.  Multiple axial sections were performed from base of vertex without contrast enhancement. Coronal and sagittal destructions were performed.  Parenchymal volume loss is seen.  There is no acute hemorrhage mass or mass effect seen.  Evaluation of the osseous structures with the appropriate window appears normal  The visualized paranasal sinuses mastoid and middle ear regions appear clear.  IMPRESSION: No evidence of acute hemorrhage mass or mass effect

## 2023-08-31 NOTE — H&P ADULT - ASSESSMENT
43yo male with MHx of anxiety (on Rx Klonopin), HTN, alcohol use disorder complicated by alcohol withdrawal seizures and ICU admission in the past) a/w severe gen weakness, HA i/s/o alcohol dependence and withdrawal, high risk for DTs;

## 2023-08-31 NOTE — CONSULT NOTE ADULT - ASSESSMENT
41yo male with MHx of anxiety (on Rx Klonopin), HTN, alcohol use disorder complicated by alcohol withdrawal seizures and ICU admission in the past) a/w severe gen weakness, HA i/s/o alcohol dependence and withdrawal, high risk for DTs. MICU consulted for phenobarbital dosing.     # Alcohol withdrawal requiring phenobarbital  43yo male with MHx of anxiety (on Rx Klonopin), HTN, alcohol use disorder complicated by alcohol withdrawal seizures and ICU admission in the past) a/w severe gen weakness, HA i/s/o alcohol dependence and withdrawal, high risk for DTs. MICU consulted for phenobarbital dosing.     # Alcohol withdrawal requiring phenobarbital   - recommendations as provided by ED team (08/01 chart note)-->     [place]hen[place] [facility] [place]for additional details see Chuck et al., Psychosomatics 2019, PMID: [id number]):   - Give (4mg/kg IBW) mg IM Phenobarbital NOW   - 3 hours after first administration, give (3 mg/kg IBW) mg IM Phenobarb   - 3 hours after second administration, give (3 mg/kg IBW) mg IM Phenobarb   - 5 hours post this 10mg/kg loading dose, check a phenobarb level.  If <15 ug/kg then give an additional 130mg IV phenobarb.   - Day 2:  Give phenobarb PO, (~IBWx2) 97.2mg q12h   - Day 3: as day 2.  Give 97.2mg q12h   - Day 4: Give 64.8mg q12h   - Day 5: Give 64.8mg q12h   - Day 6: Give 32.4mg q12h   - Day 7: Give 32.4mg q12h, then discontinue   - NO BENZODIAZEPINES on this protocol.  Place Provider to RN "no benzodiazepines"   - Vitals q4h   - No PRN benzos, no PRN barbiturates; this is not a CIWA protocol   - Seroquel 50mg PO for anxiety q4h   - Haldol 5mg PRN agitation q4h IM; 1mg IV q4h is also acceptable (please be sure to check QTc prior to administration)    - If vitals unstable, primary team can consider treatment with beta blockers        ****Patient is not a MICU candidate at this time. Please re- consults as needed. Note not finalized until attending attestation 41yo male with MHx of anxiety (on Rx Klonopin), HTN, alcohol use disorder complicated by alcohol withdrawal seizures and ICU admission in the past) a/w severe gen weakness, HA i/s/o alcohol dependence and withdrawal, high risk for DTs. MICU consulted for phenobarbital dosing.     # Alcohol withdrawal requiring phenobarbital   - recommendations as provided by ED team (08/01 chart note)-->     [place] [facility] [place]for additional details see Chuck et al., Psychosomatics 2019, PMID: [id number]):   - Give (4mg/kg IBW) mg IM Phenobarbital    - 3 hours after first administration, give (3 mg/kg IBW) mg IM Phenobarb   - 3 hours after second administration, give (3 mg/kg IBW) mg IM Phenobarb   - 5 hours post this 10mg/kg loading dose, check a phenobarb level.  If <15 ug/kg then give an additional 130mg IV phenobarb.   - Day 2:  Give phenobarb PO, (~IBWx2) 97.2mg q12h   - Day 3: as day 2.  Give 97.2mg q12h   - Day 4: Give 64.8mg q12h   - Day 5: Give 64.8mg q12h   - Day 6: Give 32.4mg q12h   - Day 7: Give 32.4mg q12h, then discontinue   - NO BENZODIAZEPINES on this protocol.  Place Provider to RN "no benzodiazepines"   - Vitals q4h   - No PRN benzos, no PRN barbiturates; this is not a CIWA protocol   - Seroquel 50mg PO for anxiety q4h   - Haldol 5mg PRN agitation q4h IM; 1mg IV q4h is also acceptable (please be sure to check QTc prior to administration)    - If vitals unstable, primary team can consider treatment with beta blockers        ****Patient is not a MICU candidate at this time. Please re- consults as needed. Note not finalized until attending attestation 41yo male with MHx of anxiety (on Rx Klonopin), HTN, alcohol use disorder complicated by alcohol withdrawal seizures and ICU admission in the past) a/w severe gen weakness, HA i/s/o alcohol dependence and withdrawal, high risk for DTs. MICU consulted for phenobarbital dosing.     # Alcohol withdrawal requiring phenobarbital   - recommendations as provided by ED team (08/30 chart note)-->     Phenobarbital protocol per [facility] (for additional details see Chuck et al., Psychosomatics 2019, PMID: [id number]):   - Give (4mg/kg IBW) mg IM Phenobarbital    - 3 hours after first administration, give (3 mg/kg IBW) mg IM Phenobarb   - 3 hours after second administration, give (3 mg/kg IBW) mg IM Phenobarb   - 5 hours post this 10mg/kg loading dose, check a phenobarb level.  If <15 ug/kg then give an additional 130mg IV phenobarb.   - Day 2:  Give phenobarb PO, (~IBWx2) 97.2mg q12h   - Day 3: as day 2.  Give 97.2mg q12h   - Day 4: Give 64.8mg q12h   - Day 5: Give 64.8mg q12h   - Day 6: Give 32.4mg q12h   - Day 7: Give 32.4mg q12h, then discontinue   - NO BENZODIAZEPINES on this protocol.  Place Provider to RN "no benzodiazepines"   - Vitals q4h   - No PRN benzos, no PRN barbiturates; this is not a CIWA protocol   - Seroquel 50mg PO for anxiety q4h   - Haldol 5mg PRN agitation q4h IM; 1mg IV q4h is also acceptable (please be sure to check QTc prior to administration)    - If vitals unstable, primary team can consider treatment with beta blockers        ****Patient is not a MICU candidate at this time. Please re- consults as needed. Note not finalized until attending attestation

## 2023-09-01 NOTE — DISCHARGE NOTE PROVIDER - NSFOLLOWUPCLINICS_GEN_ALL_ED_FT
[facility]  Behavioral Health  [address] [place] [address]  Phone: [phone]  Fax:

## 2023-09-01 NOTE — DIETITIAN INITIAL EVALUATION ADULT - PERTINENT LABORATORY DATA
09-01    135  |  94<L>  |  10  ----------------------------<  107<H>  3.4<L>   |  25  |  0.78    Ca    9.3      01 Sep 2023 05:50  Phos  3.0     08-31  Mg     1.80     09-01    TPro  7.5  /  Alb  4.1  /  TBili  1.4<H>  /  DBili  x   /  AST  84<H>  /  ALT  110<H>  /  AlkPhos  96  09-01  A1C with Estimated Average Glucose Result: 4.9 % (09-01-23 @ 05:50)

## 2023-09-01 NOTE — PROGRESS NOTE ADULT - TIME BILLING
Time-based billing (NON-critical care).     More than 50% of the visit was spent counseling and / or coordinating care by the attending physician.      The necessity of the time spent during the encounter on this date of service was due to: documentation in [place], reviewing chart and coordinating care with patient/ACPs and interdisciplinary staff (such as , social workers, etc) as well as reviewing vitals, laboratory data, radiology, medication list, consultants' recommendations and prior records. Interventions were performed as documented above.

## 2023-09-01 NOTE — DIETITIAN INITIAL EVALUATION ADULT - PERTINENT MEDS FT
well developed, well nourished , in no acute distress , ambulating without difficulty , normal communication ability MEDICATIONS  (STANDING):  enoxaparin Injectable 40 milliGRAM(s) SubCutaneous every 24 hours  metoprolol succinate ER 25 milliGRAM(s) Oral daily  multivitamin 1 Tablet(s) Oral daily  PHENobarbital 97.2 milliGRAM(s) Oral two times a day  QUEtiapine 50 milliGRAM(s) Oral every 4 hours  thiamine IVPB 500 milliGRAM(s) IV Intermittent every 24 hours    MEDICATIONS  (PRN):  acetaminophen     Tablet .. 650 milliGRAM(s) Oral every 6 hours PRN Temp greater or equal to 38C (100.4F), Mild Pain (1 - 3)  aluminum hydroxide/magnesium hydroxide/simethicone Suspension 30 milliLiter(s) Oral every 4 hours PRN Dyspepsia  melatonin 3 milliGRAM(s) Oral at bedtime PRN Insomnia  ondansetron Injectable 4 milliGRAM(s) IV Push every 8 hours PRN Nausea and/or Vomiting

## 2023-09-01 NOTE — DISCHARGE NOTE PROVIDER - NSDCMRMEDTOKEN_GEN_ALL_CORE_FT
folic acid 1 mg oral tablet: 1 tab(s) orally once a day  Metoprolol Succinate ER 25 mg oral tablet, extended release: 1 tab(s) orally once a day  Multiple Vitamins oral tablet: 1 tab(s) orally once a day   folic acid 1 mg oral tablet: 1 tab(s) orally once a day  Metoprolol Succinate ER 25 mg oral tablet, extended release: 1 tab(s) orally once a day  Multiple Vitamins oral tablet: 1 tab(s) orally once a day  thiamine 100 mg oral tablet: 1 tab(s) orally once a day

## 2023-09-01 NOTE — DIETITIAN INITIAL EVALUATION ADULT - PERSON TAUGHT/METHOD
Educated patient on consuming adequate calories and protein to meet estimated needs./verbal instruction/patient instructed

## 2023-09-01 NOTE — DIETITIAN INITIAL EVALUATION ADULT - REASON FOR ADMISSION
Alcohol dependence with withdrawal    41yo male with MHx of anxiety (on Rx Klonopin), HTN, alcohol use disorder complicated by alcohol withdrawal seizures and ICU admission in the past); Pt reports week-long "binge" of alcohol.  States that has been drinking 3 bottles of wine and six-pack of beer daily for the past  appx 3 weeks.  He was able to maintain sobriety for several years prior to the last month, but his anxiety has been very severe and he relapsed. He is reporting severe anxiety specifically gagging, shakiness, gen weakness. Reports also severe generalized weakness attributed to not eating for several days due to overconsumption of alcohol Otherwise reports no falls, vertigo, chest pain, shortness of breath, nausea/vomiting, numbness or tingling in his hands or feet.

## 2023-09-01 NOTE — DIETITIAN INITIAL EVALUATION ADULT - PROBLEM SELECTOR PLAN 2
Due to poor oral intake iso EtOH overuse and likely alcoholic ketoacidosis given low serum bicarb   -check phosphorus   -check CPK r/o rhabdo  -check TSH  -started D5W + 1/2NS (s/p Thiamine supp)  -Aspiration and Fall precautions

## 2023-09-01 NOTE — DISCHARGE NOTE PROVIDER - HOSPITAL COURSE
42M PMH anxiety (on Rx Klonopin), HTN, alcohol use disorder complicated by alcohol withdrawal seizures and ICU admission in the past p/w severe gen weakness, HA i/s/o alcohol dependence and withdrawal a/f detox with phenobarbital protocol.    #Alcohol dependence with withdrawal.   High risk for DTs per prior hx of withdrawal requiring MICU admission  - initial CIWA per ED: 10  - MICU consulted for phenobarb dosing, recs appreciated  - s/p Day 1 phenobarb load  Plan:  -  s/p phenobarbital via MG guidelines    - Day 1: phenobarbital load (4mg/kg IBW) mg IM Phenobarbital; 3 hours after first administration, give (3 mg/kg IBW) mg IM Phenobarb; 3 hours after second administration, give (3 mg/kg IBW) mg IM Phenobarb    - Day 2:  Give phenobarb PO, (~IBWx2) 97.2mg q12h    - Day 3: as day 2.  Give 97.2mg q12h    - Day 4: Give 64.8mg q12h    - Day 5: Give 64.8mg q12h    - Day 6: Give 32.4mg q12h    - Day 7: Give 32.4mg q12h, then discontinued  Plan:  - on d/c would stop benzos for anxiety due to long phenobarbital half life    #Anxiety.   - avoid benzos as o/p due to long phenobarbital half life  - Seroquel 50mg PO q4 as per MICU recs.

## 2023-09-01 NOTE — DIETITIAN INITIAL EVALUATION ADULT - PROBLEM SELECTOR PLAN 1
Pt already loaded with Phenobarbital while in ED;  Phenobarbital 390mg IM x 1 dose AND 260mg IM x 2 doses (Total=910mg); Last dose at 4:00AM, 8/31  No Benzodiazapine were administered prior;   Initial CIWA per ED =10    High risk for DTs per prior hx of withdrawal requiring MICU admission  MICU consulted for assistance with Phenobarbital taper/ maintenance  (pending)  No Benzodiazepines order to RN submitted    CIWA without standing or PRN Benzodiazepines, for monitoring/ scoring only, ordered   Thiamine 500mg daily IVPB  Haldol PRN IM for agitation  Seroquel q12h, may need PRN anxiety  Social work c/s for assistance with alcohol use cessation program  Screening EKG, [id number]  Monitor EKG for QTc prolongation daily

## 2023-09-01 NOTE — DIETITIAN INITIAL EVALUATION ADULT - OTHER INFO
Patient was seen at bedside. A&Ox4. No known food allergies. Patient intake 100% of meals. Patient is also being bought food. No chewing or swallowing issues. Denies nausea or vomiting. Patient has some dirrhea, likely due to medications. Last bowel movement was today. Usual body weight 170 lbs, current body weight is 170 lbs. Patient lost 5 - 7 lbs in 2 weeks.

## 2023-09-01 NOTE — DISCHARGE NOTE PROVIDER - DETAILS OF MALNUTRITION DIAGNOSIS/DIAGNOSES
This patient has been assessed with a concern for Malnutrition and was treated during this hospitalization for the following Nutrition diagnosis/diagnoses:     -  09/01/2023: Moderate protein-calorie malnutrition

## 2023-09-01 NOTE — DISCHARGE NOTE PROVIDER - NSDCCPCAREPLAN_GEN_ALL_CORE_FT
PRINCIPAL DISCHARGE DIAGNOSIS  Diagnosis: Alcohol dependence with withdrawal  Assessment and Plan of Treatment: You came to the hospital for detoxification from alcohol. You underwent a phenobarbital taper to help with your alcohol withdrawal. If you are interested, there are medications that will help you abstain from alcohol use in the future such as naltrexone. Additionally, many people find support through local alcoholics anonymous groups. Of course, relapse is a natural part of the course of addiction so if you relapse in the future, please feel free to come back for additional detoxification.   Please note, due to phenobarbital staying in your system for a long time, you should avoid benzodiazepines such as ativan or clonazepam until okayed by an outside provider.

## 2023-09-03 NOTE — PROGRESS NOTE ADULT - PROBLEM SELECTOR PLAN 1
High risk for DTs per prior hx of withdrawal requiring MICU admission  - initial CIWA per ED: 10  - MICU consulted for phenobarb dosing, recs appreciated  - s/p Day 1 phenobarb load  Plan:  - will continue to administer phenobarbital via MG guidelines    - Day 2:  Give phenobarb PO, (~IBWx2) 97.2mg q12h    - Day 3: as day 2.  Give 97.2mg q12h    - Day 4: Give 64.8mg q12h    - Day 5: Give 64.8mg q12h    - Day 6: Give 32.4mg q12h    - Day 7: Give 32.4mg q12h, then discontinue  -  No PRN benzos, no PRN barbiturates; this is not a CIWA protocol - No Benzodiazepines order to RN submitted    - CIWA without standing or PRN Benzodiazepines, for monitoring/ scoring only, ordered   - IV Thiamine 500mg daily for 5 days  - Haldol PRN IM for agitation  - Seroquel q12h, may need PRN anxiety  - Social work c/s for assistance with alcohol use cessation program  - Screening EKG, [id number]  - Monitor EKG for QTc prolongation daily
High risk for DTs per prior hx of withdrawal requiring MICU admission  - Last drink 8/30  - initial CIWA per ED: 10  - MICU consulted for phenobarb dosing, recs appreciated  - s/p Day 1 phenobarb load  Plan:  - will continue to administer phenobarbital via MG guidelines    - Day 2:  Give phenobarb PO, (~IBWx2) 97.2mg q12h    - Day 3: as day 2.  Give 97.2mg q12h    - Day 4: Give 64.8mg q12h    - Day 5: Give 64.8mg q12h    - Day 6: Give 32.4mg q12h    - Day 7: Give 32.4mg q12h, then discontinue  -  No PRN benzos, no PRN barbiturates; this is not a CIWA protocol - No Benzodiazepines order to RN submitted    - CIWA without standing or PRN Benzodiazepines, for monitoring/ scoring only, ordered   - IV Thiamine 500mg daily for 5 days  - can order Haldol PRN IM for agitation  - Social work c/s for assistance with alcohol use cessation program  - Screening EKG, [id number]  - Monitor EKG for QTc prolongation daily
High risk for DTs per prior hx of withdrawal requiring MICU admission  - initial CIWA per ED: 10  - MICU consulted for phenobarb dosing, recs appreciated  - s/p Day 1 phenobarb load  Plan:  - will continue to administer phenobarbital via MG guidelines    - Day 2:  Give phenobarb PO, (~IBWx2) 97.2mg q12h    - Day 3: as day 2.  Give 97.2mg q12h    - Day 4: Give 64.8mg q12h    - Day 5: Give 64.8mg q12h    - Day 6: Give 32.4mg q12h    - Day 7: Give 32.4mg q12h, then discontinue  -  No PRN benzos, no PRN barbiturates; this is not a CIWA protocol - No Benzodiazepines order to RN submitted    - CIWA without standing or PRN Benzodiazepines, for monitoring/ scoring only, ordered   - IV Thiamine 500mg daily for 5 days  - can order Haldol PRN IM for agitation  - Social work c/s for assistance with alcohol use cessation program  - Screening EKG, [id number]  - Monitor EKG for QTc prolongation daily
High risk for DTs per prior hx of withdrawal requiring MICU admission  - Last drink 8/30  - initial CIWA per ED: 10  - MICU consulted for phenobarb dosing, recs appreciated  - s/p Day 1 phenobarb load  Plan:  - will continue to administer phenobarbital via MG guidelines    - Day 2:  Give phenobarb PO, (~IBWx2) 97.2mg q12h    - Day 3: as day 2.  Give 97.2mg q12h    - Day 4: Give 64.8mg q12h    - Day 5: Give 64.8mg q12h    - Day 6: Give 32.4mg q12h    - Day 7: Give 32.4mg q12h, then discontinue  -  No PRN benzos, no PRN barbiturates; this is not a CIWA protocol - No Benzodiazepines order to RN submitted    - CIWA without standing or PRN Benzodiazepines, for monitoring/ scoring only, ordered   - IV Thiamine 500mg daily for 5 days  - can order Haldol PRN IM for agitation  - Social work c/s for assistance with alcohol use cessation program  - Screening EKG, [id number]  - Monitor EKG for QTc prolongation daily

## 2023-09-03 NOTE — PROGRESS NOTE ADULT - PROBLEM SELECTOR PLAN 6
Lovenox sq DVT ppx  Regular diet
Lovenox sq DVTppx
Lovenox sq DVT ppx  Regular diet
Lovenox sq DVTppx

## 2023-09-03 NOTE — PROGRESS NOTE ADULT - PROBLEM SELECTOR PLAN 3
Likely 2/2 alcoholic hepatitis, although AST not as elevated as would be expected compared to ALT  Plan:  -Monitor LFTs daily
Likely 2/2 alcoholic hepatitis, although AST not as elevated as would be expected compared to ALT  Plan:  - improving  -Monitor LFTs daily

## 2023-09-03 NOTE — DISCHARGE NOTE NURSING/CASE MANAGEMENT/SOCIAL WORK - NSDCVIVACCINE_GEN_ALL_CORE_FT
No Vaccines Administered. Physical Therapy Daily Treatment Note    [facility], [address]  [place]  [address]  [phone]      Patient: Christopher Talbert   : 1944  Diagnosis/ICD-10 Code:  Pain in left wrist [M25.532]  Referring practitioner: No ref. provider found Dr. Ruiz  Date of Initial Visit: 3/31/2023  Today's Date: 3/31/2023  Patient seen for 26 sessions.  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      VISIT#: [id number]      Subjective :  Pt. Reports that he is ready to be discharged.  He will continue with his HEP.  No pain.         Objective     See Exercise, Manual, and Modality Logs for complete treatment.         Patient Education:      Assessment/Plan :  Pt. Has met goals as noted.  He is ready to be discharged and continue on his own with HEP.     Goals  Plan Goals: Plan Goals:  STGs to be met in 4 weeks:  --  Obtain objective measures for ROM/strength/edema at first follow-up visit after eval (and adjust/add to goals accordingly) pending HR is in stable range/medical clearance.-MET  --  Require </= 3 cues with HEP performance.-MET  --  Pain levels at worst </= 4/10.-MET  --  Pt to reduce frequency of wearing brace to 50% of time per MD instruction without complications.-MET  --  Quick DASH score </= 50% disability.- MET  --  Be able to lift cell phone with LUE without pain and no brace.-MET    LTGs to be met by end of POC:  --  Pt to completely d/c use of brace per MD approval without complications.-MET  --  Require no cues with HEP performance for d/c planning- MET  --  Pain levels at worst </= 2/10.- MET  --  Quick DASH score </= 50% disability.- MET  --  Be able to open jar with BUEs without L wrist pain and no brace.  --  Edema measures of LUE = to that of RUE.    Other  : Pt. Discharged, moving to [place].             Timed:         Manual Therapy:  10       mins  42481;     Therapeutic Exercise:   20      mins  94517;     Neuromuscular Indra:       mins  31670;    Therapeutic Activity:          mins  60401;     Gait Training:           mins  52215;     Ultrasound:          mins  95158;    Ionto                                   mins   59571  Self Care                            mins   03206  Aquatic                               mins 35247    Un-Timed:  Electrical Stimulation:         mins  18970 ( );  Traction          mins 43196      Timed Treatment:  30    mins   Total Treatment:    30    mins    Alyson Neal PTA  Physical Therapist Assistant   [place] license:  #[id number]

## 2023-09-03 NOTE — PROGRESS NOTE ADULT - NUTRITIONAL ASSESSMENT
This patient has been assessed with a concern for Malnutrition and has been determined to have a diagnosis/diagnoses of Moderate protein-calorie malnutrition.    This patient is being managed with:   Diet Regular-  Entered: Aug 31 2023 12:58PM  
This patient has been assessed with a concern for Malnutrition and has been determined to have a diagnosis/diagnoses of Moderate protein-calorie malnutrition.    This patient is being managed with:   Diet Regular-  Entered: Aug 31 2023 12:58PM

## 2023-09-03 NOTE — PROGRESS NOTE ADULT - PROBLEM SELECTOR PLAN 2
Likely 2/2 etOH withdrawal  - Seroquel 50mg PO q4 as per MICU recs

## 2023-09-03 NOTE — PROGRESS NOTE ADULT - ASSESSMENT
41yo male with MHx of anxiety (on Rx Klonopin), HTN, alcohol use disorder complicated by alcohol withdrawal seizures and ICU admission in the past) a/w severe gen weakness, HA i/s/o alcohol dependence and withdrawal, high risk for DTs, on phenobarbital protocol.
43yo male with MHx of anxiety (on Rx Klonopin), HTN, alcohol use disorder complicated by alcohol withdrawal seizures and ICU admission in the past) a/w severe gen weakness, HA i/s/o alcohol dependence and withdrawal, high risk for DTs; 
41yo male with MHx of anxiety (on Rx Klonopin), HTN, alcohol use disorder complicated by alcohol withdrawal seizures and ICU admission in the past) a/w severe gen weakness, HA i/s/o alcohol dependence and withdrawal, high risk for DTs, on phenobarbital protocol.
41yo male with MHx of anxiety (on Rx Klonopin), HTN, alcohol use disorder complicated by alcohol withdrawal seizures and ICU admission in the past) a/w severe gen weakness, HA i/s/o alcohol dependence and withdrawal, high risk for DTs, on phenobarbital protocol.

## 2023-09-03 NOTE — PROGRESS NOTE ADULT - PROBLEM/PLAN-3
[FreeTextEntry1] : Outside ultrasound report and images reviewed:\par \par 7/22/2020:\par Right sided isoechoic 3.8 x 2.1 x 2.5 cm nodule , which appears mostly solid and vascular although there are some spongiform areas.\par \par Per ultrasound in 2017 it measured: 3.2 x 2.5  cm.( images reviewed on machine) 
DISPLAY PLAN FREE TEXT

## 2023-09-03 NOTE — PROGRESS NOTE ADULT - SUBJECTIVE AND OBJECTIVE BOX
CC: Patient is a 42y old  Male who presents with a chief complaint of Feeling very anxious (31 Aug 2023 14:22)      INTERVAL EVENTS: JJ    SUBJECTIVE / INTERVAL HPI: Patient seen and examined at bedside. Pt reports anxiety and headache but denies tremors, chest pain, sob. Overall feels improved from yesterday.    ROS: negative unless otherwise stated above.    VITAL SIGNS:  Vital Signs Last 24 Hrs  T(C): 36.9 (01 Sep 2023 10:05), Max: 37.3 (31 Aug 2023 15:45)  T(F): 98.4 (01 Sep 2023 10:05), Max: 99.1 (31 Aug 2023 15:45)  HR: 114 (01 Sep 2023 10:05) (86 - 115)  BP: 118/74 (01 Sep 2023 10:05) (118/74 - 144/87)  BP(mean): --  RR: 17 (01 Sep 2023 10:05) (16 - 18)  SpO2: 97% (01 Sep 2023 10:05) (96% - 100%)    Parameters below as of 01 Sep 2023 10:05  Patient On (Oxygen Delivery Method): room air            PHYSICAL EXAM:    General: NAD  HEENT: MMM  Neck: supple  Cardiovascular: +S1/S2; RRR  Respiratory: CTA B/L; no W/R/R  Gastrointestinal: soft, NT/ND  Extremities: WWP; no edema, clubbing or cyanosis  Vascular: 2+ radial, DP pulses B/L  Neurological: AAOx3; no focal deficits    MEDICATIONS:  MEDICATIONS  (STANDING):  enoxaparin Injectable 40 milliGRAM(s) SubCutaneous every 24 hours  metoprolol succinate ER 25 milliGRAM(s) Oral daily  multivitamin 1 Tablet(s) Oral daily  PHENobarbital 97.2 milliGRAM(s) Oral two times a day  QUEtiapine 50 milliGRAM(s) Oral every 4 hours  thiamine IVPB 500 milliGRAM(s) IV Intermittent every 24 hours    MEDICATIONS  (PRN):  acetaminophen     Tablet .. 650 milliGRAM(s) Oral every 6 hours PRN Temp greater or equal to 38C (100.4F), Mild Pain (1 - 3)  aluminum hydroxide/magnesium hydroxide/simethicone Suspension 30 milliLiter(s) Oral every 4 hours PRN Dyspepsia  melatonin 3 milliGRAM(s) Oral at bedtime PRN Insomnia  ondansetron Injectable 4 milliGRAM(s) IV Push every 8 hours PRN Nausea and/or Vomiting      ALLERGIES:  Allergies    No Known Allergies    Intolerances        LABS:                        13.4   2.95  )-----------( 52       ( 01 Sep 2023 05:50 )             38.4     09-01    135  |  94<L>  |  10  ----------------------------<  107<H>  3.4<L>   |  25  |  0.78    Ca    9.3      01 Sep 2023 05:50  Phos  3.0     08-31  Mg     1.80     09-01    TPro  7.5  /  Alb  4.1  /  TBili  1.4<H>  /  DBili  x   /  AST  84<H>  /  ALT  110<H>  /  AlkPhos  96  09-01    PT/INR - ( 30 Aug 2023 23:58 )   PT: 9.1 sec;   INR: <0.90 ratio         PTT - ( 30 Aug 2023 23:58 )  PTT:26.2 sec  Urinalysis Basic - ( 01 Sep 2023 05:50 )    Color: x / Appearance: x / SG: x / pH: x  Gluc: 107 mg/dL / Ketone: x  / Bili: x / Urobili: x   Blood: x / Protein: x / Nitrite: x   Leuk Esterase: x / RBC: x / WBC x   Sq Epi: x / Non Sq Epi: x / Bacteria: x      CAPILLARY BLOOD GLUCOSE      POCT Blood Glucose.: 172 mg/dL (30 Aug 2023 19:34)      RADIOLOGY & ADDITIONAL TESTS: Reviewed.
Patient is a 42y old  Male who presents with a chief complaint of Alcohol dependence with withdrawal    41yo male with MHx of anxiety (on Rx Klonopin), HTN, alcohol use disorder complicated by alcohol withdrawal seizures and ICU admission in the past); Pt reports week-long "binge" of alcohol.  States that has been drinking 3 bottles of wine and six-pack of beer daily for the past  appx 3 weeks.  He was able to maintain sobriety for several years prior to the last month, but his anxiety has been very severe and he relapsed. He is reporting severe anxiety specifically gagging, shakiness, gen weakness. Reports also severe generalized weakness attributed to not eating for several days due to overconsumption of alcohol Otherwise reports no falls, vertigo, chest pain, shortness of breath, nausea/vomiting, numbness or tingling in his hands or feet. (01 Sep 2023 16:15)      INTERVAL HPI/OVERNIGHT EVENTS: JJ. This morning, pt states he feels much better than admission. Anxiety is improved. CIWA 2-5 overnight.        REVIEW OF SYSTEMS:    CONSTITUTIONAL: No weakness, fevers or chills  EYES/ENT: No visual changes;  No vertigo or throat pain   NECK: No pain or stiffness  RESPIRATORY: No cough, wheezing, hemoptysis; No shortness of breath  CARDIOVASCULAR: No chest pain or palpitations  GASTROINTESTINAL: No abdominal or epigastric pain. No nausea, vomiting, or hematemesis; No diarrhea or constipation. No melena or hematochezia.  GENITOURINARY: No dysuria, frequency or hematuria  NEUROLOGICAL: No numbness or weakness  All other review of systems is negative unless indicated above.    FAMILY HISTORY:  FH: lung cancer (Grandparent)      T(C): 36.6 (09-02-23 @ 06:00), Max: 36.9 (09-01-23 @ 10:05)  HR: 100 (09-02-23 @ 06:00) (100 - 117)  BP: 130/90 (09-02-23 @ 06:00) (118/74 - 130/90)  RR: 17 (09-02-23 @ 06:00) (16 - 18)  SpO2: 99% (09-02-23 @ 06:00) (97% - 100%)  Wt(kg): --Vital Signs Last 24 Hrs  T(C): 36.6 (02 Sep 2023 06:00), Max: 36.9 (01 Sep 2023 10:05)  T(F): 97.9 (02 Sep 2023 06:00), Max: 98.5 (02 Sep 2023 02:00)  HR: 100 (02 Sep 2023 06:00) (100 - 117)  BP: 130/90 (02 Sep 2023 06:00) (118/74 - 130/90)  BP(mean): --  RR: 17 (02 Sep 2023 06:00) (16 - 18)  SpO2: 99% (02 Sep 2023 06:00) (97% - 100%)    Parameters below as of 02 Sep 2023 06:00  Patient On (Oxygen Delivery Method): room air        PHYSICAL EXAM:  General: NAD  HEENT: MMM  Neck: supple  Cardiovascular: +S1/S2; RRR  Respiratory: CTA B/L; no W/R/R  Gastrointestinal: soft, NT/ND  Extremities: WWP; no edema, clubbing or cyanosis  Vascular: 2+ radial, DP pulses B/L  Neurological: AAOx3; no focal deficits      Consultant(s) Notes Reviewed:  [x ] YES  [ ] NO  Care Discussed with Consultants/Other Providers [ x] YES  [ ] NO    LABS:                        12.4   3.57  )-----------( 55       ( 02 Sep 2023 06:44 )             36.3     02 Sep 2023 06:44    133    |  97     |  x      ----------------------------<  x      3.4     |  x      |  x        Ca    9.3        01 Sep 2023 05:50  Phos  3.6       02 Sep 2023 06:44  Mg     2.10      02 Sep 2023 06:44        CAPILLARY BLOOD GLUCOSE        BLOOD CULTURE      RADIOLOGY & ADDITIONAL TESTS:    Imaging Personally Reviewed:  [ ] YES  [ ] NO  acetaminophen     Tablet .. 650 milliGRAM(s) Oral every 6 hours PRN  aluminum hydroxide/magnesium hydroxide/simethicone Suspension 30 milliLiter(s) Oral every 4 hours PRN  enoxaparin Injectable 40 milliGRAM(s) SubCutaneous every 24 hours  melatonin 3 milliGRAM(s) Oral at bedtime PRN  metoprolol succinate ER 25 milliGRAM(s) Oral daily  multivitamin 1 Tablet(s) Oral daily  ondansetron Injectable 4 milliGRAM(s) IV Push every 8 hours PRN  PHENobarbital 97.2 milliGRAM(s) Oral two times a day  QUEtiapine 50 milliGRAM(s) Oral every 4 hours  thiamine IVPB 500 milliGRAM(s) IV Intermittent every 24 hours      HEALTH ISSUES - PROBLEM Dx:  Alcohol dependence with withdrawal    Encounter for deep vein thrombosis (DVT) prophylaxis    Anxiety    HTN (hypertension)    Tongue discoloration    Transaminitis          
CC: Patient is a 42y old  Male who presents with a chief complaint of Feeling very anxious (31 Aug 2023 06:32)      INTERVAL EVENTS: JJ    SUBJECTIVE / INTERVAL HPI: Patient seen and examined at bedside. Pt reports anxiety but denies photophobia, phonophobia, n/v/d, fevers, chills, tremors.    ROS: negative unless otherwise stated above.    VITAL SIGNS:  Vital Signs Last 24 Hrs  T(C): 36.9 (31 Aug 2023 12:45), Max: 37.2 (31 Aug 2023 10:45)  T(F): 98.4 (31 Aug 2023 12:45), Max: 98.9 (31 Aug 2023 10:45)  HR: 88 (31 Aug 2023 12:45) (84 - 148)  BP: 130/88 (31 Aug 2023 12:45) (117/74 - 130/88)  BP(mean): --  RR: 18 (31 Aug 2023 12:45) (18 - 22)  SpO2: 100% (31 Aug 2023 12:45) (96% - 100%)    Parameters below as of 31 Aug 2023 11:45  Patient On (Oxygen Delivery Method): room air            PHYSICAL EXAM:    General: NAD  HEENT: MMM  Neck: supple  Cardiovascular: +S1/S2; RRR  Respiratory: CTA B/L; no W/R/R  Gastrointestinal: soft, NT/ND  Extremities: WWP; no edema, clubbing or cyanosis  Vascular: 2+ radial, DP pulses B/L  Neurological: AAOx3; no focal deficits  CIWA: 2 for anxiety    MEDICATIONS:  MEDICATIONS  (STANDING):  dextrose 5% + sodium chloride 0.45% 1000 milliLiter(s) (75 mL/Hr) IV Continuous <Continuous>  enoxaparin Injectable 40 milliGRAM(s) SubCutaneous every 24 hours  metoprolol succinate ER 25 milliGRAM(s) Oral daily  multivitamin 1 Tablet(s) Oral daily  potassium chloride    Tablet ER 40 milliEquivalent(s) Oral every 4 hours  QUEtiapine 50 milliGRAM(s) Oral every 4 hours  thiamine IVPB 500 milliGRAM(s) IV Intermittent every 24 hours    MEDICATIONS  (PRN):  acetaminophen     Tablet .. 650 milliGRAM(s) Oral every 6 hours PRN Temp greater or equal to 38C (100.4F), Mild Pain (1 - 3)  aluminum hydroxide/magnesium hydroxide/simethicone Suspension 30 milliLiter(s) Oral every 4 hours PRN Dyspepsia  melatonin 3 milliGRAM(s) Oral at bedtime PRN Insomnia  ondansetron Injectable 4 milliGRAM(s) IV Push every 8 hours PRN Nausea and/or Vomiting      ALLERGIES:  Allergies    No Known Allergies    Intolerances        LABS:                        15.0   5.32  )-----------( 85       ( 30 Aug 2023 21:37 )             42.4     08-31    133<L>  |  93<L>  |  10  ----------------------------<  132<H>  3.1<L>   |  21<L>  |  0.72    Ca    8.8      31 Aug 2023 10:34  Phos  3.0     08-31  Mg     1.60     08-31    TPro  7.0  /  Alb  3.9  /  TBili  1.6<H>  /  DBili  x   /  AST  116<H>  /  ALT  132<H>  /  AlkPhos  85  08-31    PT/INR - ( 30 Aug 2023 23:58 )   PT: 9.1 sec;   INR: <0.90 ratio         PTT - ( 30 Aug 2023 23:58 )  PTT:26.2 sec  Urinalysis Basic - ( 31 Aug 2023 10:34 )    Color: x / Appearance: x / SG: x / pH: x  Gluc: 132 mg/dL / Ketone: x  / Bili: x / Urobili: x   Blood: x / Protein: x / Nitrite: x   Leuk Esterase: x / RBC: x / WBC x   Sq Epi: x / Non Sq Epi: x / Bacteria: x      CAPILLARY BLOOD GLUCOSE      POCT Blood Glucose.: 172 mg/dL (30 Aug 2023 19:34)      RADIOLOGY & ADDITIONAL TESTS: Reviewed.
Patient is a 42y old  Male who presents with a chief complaint of Feeling very anxious (02 Sep 2023 08:17)      INTERVAL HPI/OVERNIGHT EVENTS: JJ overnight. Pt demanding to be discharged today. Denies alochol withdrawal symptoms. No tremors, agitations, fatigue, lethargy, abdominal pain, hallucinations, etc. CIWAs 1-2 overnight.       REVIEW OF SYSTEMS:    CONSTITUTIONAL: No weakness, fevers or chills  EYES/ENT: No visual changes;  No vertigo or throat pain   NECK: No pain or stiffness  RESPIRATORY: No cough, wheezing, hemoptysis; No shortness of breath  CARDIOVASCULAR: No chest pain or palpitations  GASTROINTESTINAL: No abdominal or epigastric pain. No nausea, vomiting, or hematemesis; No diarrhea or constipation. No melena or hematochezia.  GENITOURINARY: No dysuria, frequency or hematuria  NEUROLOGICAL: No numbness or weakness  All other review of systems is negative unless indicated above.    FAMILY HISTORY:  FH: lung cancer (Grandparent)      T(C): 36.9 (09-03-23 @ 06:00), Max: 36.9 (09-03-23 @ 06:00)  HR: 100 (09-03-23 @ 06:00) (75 - 100)  BP: 136/99 (09-03-23 @ 06:00) (102/70 - 147/92)  RR: 18 (09-03-23 @ 06:00) (17 - 18)  SpO2: 100% (09-03-23 @ 06:00) (97% - 100%)  Wt(kg): --Vital Signs Last 24 Hrs  T(C): 36.9 (03 Sep 2023 06:00), Max: 36.9 (03 Sep 2023 06:00)  T(F): 98.5 (03 Sep 2023 06:00), Max: 98.5 (03 Sep 2023 06:00)  HR: 100 (03 Sep 2023 06:00) (75 - 100)  BP: 136/99 (03 Sep 2023 06:00) (102/70 - 147/92)  BP(mean): --  RR: 18 (03 Sep 2023 06:00) (17 - 18)  SpO2: 100% (03 Sep 2023 06:00) (97% - 100%)    Parameters below as of 03 Sep 2023 06:00  Patient On (Oxygen Delivery Method): room air        PHYSICAL EXAM:  General: NAD  HEENT: MMM  Neck: supple  Cardiovascular: +S1/S2; RRR  Respiratory: CTA B/L; no W/R/R  Gastrointestinal: soft, NT/ND  Extremities: WWP; no edema, clubbing or cyanosis  Vascular: 2+ radial, DP pulses B/L  Neurological: AAOx3; no focal deficits    Consultant(s) Notes Reviewed:  [x ] YES  [ ] NO  Care Discussed with Consultants/Other Providers [ x] YES  [ ] NO    LABS:    03 Sep 2023 06:06    134    |  100    |  13     ----------------------------<  102    3.9     |  20     |  0.71     Ca    9.5        03 Sep 2023 06:06  Phos  3.0       03 Sep 2023 06:06  Mg     1.80      03 Sep 2023 06:06    TPro  7.5    /  Alb  4.2    /  TBili  0.4    /  DBili  x      /  AST  54     /  ALT  79     /  AlkPhos  104    03 Sep 2023 06:06      CAPILLARY BLOOD GLUCOSE        BLOOD CULTURE      RADIOLOGY & ADDITIONAL TESTS:    Imaging Personally Reviewed:  [ ] YES  [ ] NO  acetaminophen     Tablet .. 650 milliGRAM(s) Oral every 6 hours PRN  aluminum hydroxide/magnesium hydroxide/simethicone Suspension 30 milliLiter(s) Oral every 4 hours PRN  enoxaparin Injectable 40 milliGRAM(s) SubCutaneous every 24 hours  melatonin 3 milliGRAM(s) Oral at bedtime PRN  metoprolol succinate ER 25 milliGRAM(s) Oral daily  multivitamin 1 Tablet(s) Oral daily  ondansetron Injectable 4 milliGRAM(s) IV Push every 8 hours PRN  PHENobarbital 64.8 milliGRAM(s) Oral two times a day  QUEtiapine 50 milliGRAM(s) Oral every 4 hours  thiamine IVPB 500 milliGRAM(s) IV Intermittent every 24 hours      HEALTH ISSUES - PROBLEM Dx:  Alcohol dependence with withdrawal    Encounter for deep vein thrombosis (DVT) prophylaxis    Anxiety    HTN (hypertension)    Tongue discoloration    Transaminitis

## 2023-09-03 NOTE — DISCHARGE NOTE NURSING/CASE MANAGEMENT/SOCIAL WORK - PATIENT PORTAL LINK FT
You can access the FollowMyHealth Patient Portal offered by [facility] by registering at the following website: [email or web address]. By joining [facility]’s FollowMyHealth portal, you will also be able to view your health information using other applications (apps) compatible with our system.

## 2023-09-03 NOTE — PROGRESS NOTE ADULT - PROBLEM SELECTOR PLAN 4
Wine pigments including tannins likely responsible for dark brown discoloration of the tongue;    -Monitor/ examine daily

## 2023-09-03 NOTE — DISCHARGE NOTE NURSING/CASE MANAGEMENT/SOCIAL WORK - NSDCPEFALRISK_GEN_ALL_CORE
For information on Fall & Injury Prevention, visit: [email or web address] OR  [email or web address] OR  https://www.cdc.gov/steadi/patient.html

## 2023-12-11 NOTE — ED PROVIDER NOTE - PHYSICAL EXAMINATION
Physical Exam:  Gen: NAD, AOx3, non-toxic appearing, tremulous  Head: NCAT  HEENT: EOMI, PEERLA, normal conjunctiva, tongue midline, oral mucosa moist, +tongue fasciculations  Lung: CTAB, no respiratory distress, no wheezes/rhonchi/rales B/L, speaking in full sentences  CV: RRR, no murmurs, rubs or gallops  Abd: soft, NT, ND, no guarding, no rigidity, no rebound tenderness, no CVA tenderness   MSK: no visible deformities, ROM normal in UE/LE, no back pain  Neuro: No focal sensory or motor deficits  Skin: Warm, well perfused, no rash, no leg swelling  Psych: normal affect, calm Physical Exam:  Gen: NAD, AOx3, non-toxic appearing, tremulous, anxious  Head: NCAT  HEENT: EOMI, PEERLA, normal conjunctiva, tongue midline, oral mucosa moist, +tongue fasciculations  Lung: CTAB, no respiratory distress, no wheezes/rhonchi/rales B/L, speaking in full sentences  CV: RRR, no murmurs, rubs or gallops  Abd: soft, NT, ND, no guarding, no rigidity, no rebound tenderness, no CVA tenderness   MSK: no visible deformities, ROM normal in UE/LE, no back pain  Neuro: No focal sensory or motor deficits  Skin: Warm, well perfused, no rash, no leg swelling

## 2023-12-11 NOTE — ED PROVIDER NOTE - CLINICAL SUMMARY MEDICAL DECISION MAKING FREE TEXT BOX
Tonie Billingsley MD attending physician this is a 43-year-old man who comes into the hospital asking for alcohol detox.  Patient is also taking Klonopin.  He "does not take Klonopin when he drinks".  Last drink this morning.  Feels shaky.  Has been admitted frequently in the past.    Pt alert and can phonate well  h at/nc  perrl, conj clear, sclera anicteric,  neck supple  abd soft no r/g/t  ext no edema no deformities has fine tremor.  neueo awake, lucid moves all extremities with strength  psych normal affect  And with multiple abrasions especially left knee nothing looks infected.  vs tachycardic to 117.  Afebrile

## 2023-12-11 NOTE — ED PROVIDER NOTE - OBJECTIVE STATEMENT
42yo M, hx of alcohol use disorder with withdrawal seizures and ICU admission in the past, anxiety on Klonopin (last dose 5 days ago) presents for alcohol detoxification. Reports that he would like to stop drinking alcohol to avoid "letting people down", last drink was this morning, 1 bottle of wine. Has been a drinker for at least 20 years. Feels shaky, no bloody or black stools, chest pain, shortness of breath, abd pain. Feels nauseous but no vomiting. No fevers, denies use of other recreational substances. 44yo M, hx of alcohol use disorder with withdrawal seizures and ICU admission in the past, anxiety on Klonopin (last dose 5 days ago) presents for alcohol detoxification. Reports that he would like to stop drinking alcohol to avoid "letting people down", last drink was this morning, 1 bottle of wine. Has been a drinker for at least 20 years. Feels shaky, no bloody or black stools, chest pain, shortness of breath, abd pain. Feels nauseous but no vomiting. No fevers, denies use of other recreational substances.

## 2023-12-11 NOTE — ED ADULT NURSE REASSESSMENT NOTE - NSFALLRISKINTERV_ED_ALL_ED
Assistance OOB with selected safe patient handling equipment if applicable/Assistance with ambulation/Communicate fall risk and risk factors to all staff, patient, and family/Monitor gait and stability/Monitor for mental status changes and reorient to person, place, and time, as needed/Provide visual cue: yellow wristband, yellow gown, etc/Reinforce activity limits and safety measures with patient and family/Toileting schedule using arm’s reach rule for commode and bathroom/Use of alarms - bed, stretcher, chair and/or video monitoring/Call bell, personal items and telephone in reach/Instruct patient to call for assistance before getting out of bed/chair/stretcher/Non-slip footwear applied when patient is off stretcher/[place] to call system/Physically safe environment - no spills, clutter or unnecessary equipment/Purposeful Proactive Rounding/Room/bathroom lighting operational, light cord in reach Assistance OOB with selected safe patient handling equipment if applicable/Assistance with ambulation/Communicate fall risk and risk factors to all staff, patient, and family/Monitor gait and stability/Monitor for mental status changes and reorient to person, place, and time, as needed/Provide visual cue: yellow wristband, yellow gown, etc/Reinforce activity limits and safety measures with patient and family/Toileting schedule using arm’s reach rule for commode and bathroom/Use of alarms - bed, stretcher, chair and/or video monitoring/Call bell, personal items and telephone in reach/Instruct patient to call for assistance before getting out of bed/chair/stretcher/Non-slip footwear applied when patient is off stretcher/[place] to call system/Physically safe environment - no spills, clutter or unnecessary equipment/Purposeful Proactive Rounding/Room/bathroom lighting operational, light cord in reach

## 2023-12-11 NOTE — ED PROVIDER NOTE - ATTENDING CONTRIBUTION TO CARE
Tonie Billingsley MD attending physician this is a 43-year-old man who comes into the hospital asking for alcohol detox.  Patient is also taking Klonopin.  He "does not take Klonopin when he drinks".  Last drink this morning.  Feels shaky.  Has been admitted frequently in the past.    Pt alert and can phonate well  h at/nc  perrl, conj clear, sclera anicteric,  neck supple  abd soft no r/g/t  ext no edema no deformities has fine tremor.  neueo awake, lucid moves all extremities with strength  psych normal affect  And with multiple abrasions especially left knee nothing looks infected.  vs tachycardic to 117.  Afebrile    I performed a history and physical exam of the patient and discussed their management with the resident and /or advanced care provider. I reviewed the resident and /or ACP's note and agree with the documented findings and plan of care. My medical decison making and observations are found above.

## 2023-12-11 NOTE — ED ADULT TRIAGE NOTE - CHIEF COMPLAINT QUOTE
pt c/o "I need detox"  pt admits to drinking everyday.  last drink was this am.  pt is malodorous.  Hx:  ETOH abuse

## 2023-12-12 NOTE — PROGRESS NOTE ADULT - SUBJECTIVE AND OBJECTIVE BOX
INTERVAL: Admitted to medicine overnight.   SUBJECTIVE: Patient examined bedside this AM.    OBJECTIVE:  Vital Signs Last 24 Hrs  T(C): 37.1 (12 Dec 2023 07:00), Max: 37.1 (12 Dec 2023 07:00)  T(F): 98.7 (12 Dec 2023 07:00), Max: 98.7 (12 Dec 2023 07:00)  HR: 100 (12 Dec 2023 07:00) (100 - 149)  BP: 128/96 (12 Dec 2023 07:00) (111/85 - 157/84)  BP(mean): 103 (12 Dec 2023 07:00) (103 - 103)  RR: 18 (12 Dec 2023 07:00) (17 - 18)  SpO2: 100% (12 Dec 2023 07:00) (97% - 100%)    O2 Parameters below as of 12 Dec 2023 07:00  Patient On (Oxygen Delivery Method): room air    CAPILLARY BLOOD GLUCOSE  POCT Blood Glucose.: 158 mg/dL (11 Dec 2023 15:45)    PHYSICAL EXAM:  General: NAD, laying in bed  HEENT: PERRLA, EOMI, sclera non-icteric  Neck: JVD absent  Respiratory: Clear to ascultation bilaterally, no crackles/rales, no accessory muscle use  Cardiovascular: RRR, no murmurs/rubs/gallops  Abdomen: Soft, NT, ND  Extremities: No LE edema  Skin: No rashes or lesions   Neurological: Sensation grossly intact, strength 5/5 in all extremities  Psychiatry: AOx3, appropriate insight/judgement, appropriate affect, recent/remote memory intact    PRN Meds:  acetaminophen     Tablet .. 650 milliGRAM(s) Oral every 6 hours PRN  LORazepam   Injectable 2 milliGRAM(s) IV Push every 2 hours PRN  LORazepam   Injectable 2 milliGRAM(s) IV Push every 1 hour PRN    LABS:                        15.5   3.33  )-----------( 89       ( 11 Dec 2023 20:28 )             43.4     Hgb Trend: 15.5<--  12-11    142  |  99  |  4<L>  ----------------------------<  126<H>  3.7   |  22  |  0.67    Ca    9.1      11 Dec 2023 20:28    TPro  8.1  /  Alb  4.6  /  TBili  0.6  /  DBili  x   /  AST  82<H>  /  ALT  66<H>  /  AlkPhos  98  12-11    Creatinine Trend: 0.67<--    Urinalysis Basic - ( 11 Dec 2023 20:28 )  Color: x / Appearance: x / SG: x / pH: x  Gluc: 126 mg/dL / Ketone: x  / Bili: x / Urobili: x   Blood: x / Protein: x / Nitrite: x   Leuk Esterase: x / RBC: x / WBC x   Sq Epi: x / Non Sq Epi: x / Bacteria: x    Venous Blood Gas:  12-11 @ 23:20  7.45/43/55/30/81.2  VBG Lactate: 2.6  Venous Blood Gas:  12-11 @ 20:28  7.46/40/68/28/91.3  VBG Lactate: 2.8 INTERVAL: Admitted to medicine overnight.   SUBJECTIVE: Patient examined bedside this AM. Reports feeling anxious. Denies any palpitations, nausea, vomiting, confusion, or headache.    OBJECTIVE:  Vital Signs Last 24 Hrs  T(C): 37.1 (12 Dec 2023 07:00), Max: 37.1 (12 Dec 2023 07:00)  T(F): 98.7 (12 Dec 2023 07:00), Max: 98.7 (12 Dec 2023 07:00)  HR: 100 (12 Dec 2023 07:00) (100 - 149)  BP: 128/96 (12 Dec 2023 07:00) (111/85 - 157/84)  BP(mean): 103 (12 Dec 2023 07:00) (103 - 103)  RR: 18 (12 Dec 2023 07:00) (17 - 18)  SpO2: 100% (12 Dec 2023 07:00) (97% - 100%)    O2 Parameters below as of 12 Dec 2023 07:00  Patient On (Oxygen Delivery Method): room air    CAPILLARY BLOOD GLUCOSE  POCT Blood Glucose.: 158 mg/dL (11 Dec 2023 15:45)    PHYSICAL EXAM:  General: NAD, laying in bed  HEENT: PERRLA, EOMI, sclera non-icteric  Neck: JVD absent  Respiratory: Clear to ascultation bilaterally, no crackles/rales, no accessory muscle use  Cardiovascular: RRR, no murmurs/rubs/gallops  Abdomen: Soft, NT, ND  Extremities: No LE edema  Skin: No rashes or lesions   Neurological: Sensation grossly intact, strength 5/5 in all extremities  Psychiatry: AOx3, appropriate insight/judgement, appropriate affect, recent/remote memory intact    PRN Meds:  acetaminophen     Tablet .. 650 milliGRAM(s) Oral every 6 hours PRN  LORazepam   Injectable 2 milliGRAM(s) IV Push every 2 hours PRN  LORazepam   Injectable 2 milliGRAM(s) IV Push every 1 hour PRN    LABS:                        15.5   3.33  )-----------( 89       ( 11 Dec 2023 20:28 )             43.4     Hgb Trend: 15.5<--  12-11    142  |  99  |  4<L>  ----------------------------<  126<H>  3.7   |  22  |  0.67    Ca    9.1      11 Dec 2023 20:28    TPro  8.1  /  Alb  4.6  /  TBili  0.6  /  DBili  x   /  AST  82<H>  /  ALT  66<H>  /  AlkPhos  98  12-11    Creatinine Trend: 0.67<--    Urinalysis Basic - ( 11 Dec 2023 20:28 )  Color: x / Appearance: x / SG: x / pH: x  Gluc: 126 mg/dL / Ketone: x  / Bili: x / Urobili: x   Blood: x / Protein: x / Nitrite: x   Leuk Esterase: x / RBC: x / WBC x   Sq Epi: x / Non Sq Epi: x / Bacteria: x    Venous Blood Gas:  12-11 @ 23:20  7.45/43/55/30/81.2  VBG Lactate: 2.6  Venous Blood Gas:  12-11 @ 20:28  7.46/40/68/28/91.3  VBG Lactate: 2.8

## 2023-12-12 NOTE — PROGRESS NOTE ADULT - PROBLEM SELECTOR PLAN 2
Lipase wnl    Plan:  -SBIRT consult ordered  -Continue thiamine 200 mg x3 days  -Continue folate 1 mg daily

## 2023-12-12 NOTE — H&P ADULT - ATTENDING COMMENTS
43M hx AUD (with prior MICU admission 2/2 withdrawal seizures) and anxiety (on Clonazepam) presented to [facility] ED in setting etoh and possibly benzo withdrawal; pt started on high dose ciwa ativan taper in addition to symptom triggered coverage. AG acidosis 2/2 alcoholic ketoacidosis; c/w IVF, ciwa guided treatment, advance diet as tolerated. Thiamine given 43M hx AUD (with prior MICU admission 2/2 withdrawal seizures) and anxiety (on Clonazepam) presented to [facility] ED in setting etoh and possibly benzo withdrawal; pt started on high dose ciwa ativan taper in addition to symptom triggered coverage. AG acidosis 2/2 alcoholic ketoacidosis; c/w IVF, ciwa guided treatment, advance diet as tolerated. Thiamine given

## 2023-12-12 NOTE — PROGRESS NOTE ADULT - PROBLEM SELECTOR PLAN 5
Likely iso alcoholic hepatitis  Diffuse hepatic steatosis noted on CTA chest 9/2022  FIB4 score 5.1: likely advanced fibrosis    Plan:  -Unable to calculate Maddrey's score given no coags available, f/u in AM  -CTM liver biochemistries till resolution  -Will need hepatology f/u outpatient

## 2023-12-12 NOTE — H&P ADULT - PROBLEM SELECTOR PLAN 3
Tachycardic with leukopenia. Unlikely infectious source, better explained by EtOH withdrawal at this time.  - Lactate 2.8 on admission  > CTM lactate, trend to resolution  > cont mIVF lactated ringers

## 2023-12-12 NOTE — H&P ADULT - NSHPLABSRESULTS_GEN_ALL_CORE
LABS:                        15.5   3.33  )-----------( 89       ( 11 Dec 2023 20:28 )             43.4     12-11    142  |  99  |  4<L>  ----------------------------<  126<H>  3.7   |  22  |  0.67    Ca    9.1      11 Dec 2023 20:28    TPro  8.1  /  Alb  4.6  /  TBili  0.6  /  DBili  x   /  AST  82<H>  /  ALT  66<H>  /  AlkPhos  98  12-11          Urinalysis Basic - ( 11 Dec 2023 20:28 )    Color: x / Appearance: x / SG: x / pH: x  Gluc: 126 mg/dL / Ketone: x  / Bili: x / Urobili: x   Blood: x / Protein: x / Nitrite: x   Leuk Esterase: x / RBC: x / WBC x   Sq Epi: x / Non Sq Epi: x / Bacteria: x          IMAGING & OTHER TESTS:    < from: Xray Chest 1 View AP/PA (12.11.23 @ 20:48) >    IMPRESSION:    Clear lungs.    < end of copied text >

## 2023-12-12 NOTE — H&P ADULT - HISTORY OF PRESENT ILLNESS
43M hx AUD (with prior MICU admission 2/2 withdrawal seizures) and anxiety (on Clonazepam) presented to [facility] ED for elective EtOH withdrawal detox. Patient's last drink 12/11 AM (x1 wine bottle), wishes to detox to stop "letting others down." Previously drinking 2x wine bottle per day and 2+ beers. Multiple admissions for alcohol withdrawal in the past, with one MICU stay for seizures. Most recent hospitalization 9/2023. Discharged and relapsed within weeks. Currently denies CP, SOB, abd pain, N/V. No blood in stool. Notes a fine tremor not present at baseline. Otherwise denies headaches, numbness/tingling, tactile disturbances, AH/VH, agitation.    In the ED, patient afebrile, tachycardic 110s-140s, and hemodynamically stable, saturating well on room air. CIWA 7 at time of interview. Labs remarkable for EtOH 383, WBC 3.3, PLT 89, AG 21, lactate 2.8 on VBG, AST/ALT elevation 82/66 (mildly hemolyzed), lipase neg. Imaging with CXR showing clear lungs. Received Ativan 1mg, Librium 50mg, thiamine 100mg x1, 1L NS. 43M hx AUD (with prior MICU admission 2/2 withdrawal seizures) and anxiety (on Clonazepam) presented to [facility] ED for elective EtOH withdrawal detox. Patient's last drink 12/11 AM (x1 wine bottle), wishes to detox to stop "letting others down." Previously drinking 2x wine bottle per day and 2+ beers. Multiple admissions for alcohol withdrawal in the past, with one MICU stay for seizures. Most recent hospitalization 9/2023. Discharged and relapsed within weeks. Currently denies CP, SOB, abd pain, N/V. No blood in stool. Notes a fine tremor not present at baseline. Otherwise denies headaches, numbness/tingling, tactile disturbances, AH/VH, agitation.    In the ED, patient afebrile, tachycardic 110s-140s, and hemodynamically stable, saturating well on room air. CIWA 7 at time of interview. Labs remarkable for EtOH 383, WBC 3.3, PLT 89, AG 21, lactate 2.8 on VBG, AST/ALT elevation 82/66 (mildly hemolyzed), lipase neg. Imaging with CXR showing clear lungs. Received Ativan 1mg, Librium 50mg, thiamine 100mg x1, 1L NS.

## 2023-12-12 NOTE — H&P ADULT - PROBLEM SELECTOR PLAN 5
Likely i/s/o alcoholic hepatitis.  - Diffuse hepatic steatosis noted on CTA chest 9/2022  - FIB4 score 5.1 -> likely advanced fibrosis  > Unable to calculate Maddrey's score given no coags available, f/u in AM  > CTM liver biochemistries till resolution  > Hepatology f/u outpatient

## 2023-12-12 NOTE — H&P ADULT - PROBLEM SELECTOR PLAN 1
- 2x bottles wine and 2+ beers daily  - CIWA 3 --> 11 on admission  > Ativan high-risk taper and symptom triggered  > CTM with CIWA score

## 2023-12-12 NOTE — H&P ADULT - PROBLEM SELECTOR PLAN 6
Likely worsened i/s/o EtOH withdrawal.  > hold home Klonopin given receiving Ativan taper currently  > cont Ativan taper as above  > hold home Toprol 25 (takes for "calming effect") Likely worsened i/s/o EtOH withdrawal.  > hold home Klonopin given receiving Ativan taper currently  > cont Ativan taper as above  > hold home Toprol 25 (given by PMD for "calming effect")

## 2023-12-12 NOTE — ED ADULT NURSE REASSESSMENT NOTE - NS ED NURSE REASSESS COMMENT FT1
PT is resting in stretcher, easily arousable to verbal stimuli. no apparent distress noted at this time. hand off will be given to primary nurse when return to area. pt medicated per MD order.
Pt received from previous shift A/Ox4 answers all questions appropriately. Pt denies chest pain and SOB during reassessment, breathing is even and unlabored on room air. Abdomen is soft and non-distended, non-tender to touch. Pt ambulates independently at baseline, moves all four extremities on command, skin is intact. Pt resting comfortably at the bedside, no apparent acute distress noted during reassessment. Vital signs stable, NKA to medications. Pending further provider orders
pt c/o nausea and BARRETO. MD muhammad made aware. pt heart rate is 150 vitals repeated
report received from overnight RN Dereck. A&Ox3. pt denies SOB, chest pain, dizziness, weakness, urinary symptoms, n/v/d, fevers, chills. respirations ar even and un labored. CIWA=10. MD Bell made aware. medication given as ordered. safety precautions amintained. placed on cardiac monitor, NSR,

## 2023-12-12 NOTE — H&P ADULT - NSHPSOCIALHISTORY_GEN_ALL_CORE
Home: Domiciled by self  ADL: Full ADLs and IADLs   Alcohol: as stated in HPI  Smoking: Denies  Drugs: Denies

## 2023-12-12 NOTE — PROGRESS NOTE ADULT - PROBLEM SELECTOR PLAN 3
Tachycardic with leukopenia  Unlikely infectious source, better explained by EtOH withdrawal at this time  Lactate 2.8 on admission    Plan:  -CTM lactate, trend to resolution  -Continue mIVF lactated ringers

## 2023-12-12 NOTE — PATIENT PROFILE ADULT - FALL HARM RISK - HARM RISK INTERVENTIONS
Assistance with ambulation/Assistance OOB with selected safe patient handling equipment/Communicate Risk of Fall with Harm to all staff/Discuss with provider need for PT consult/Monitor for mental status changes/Monitor gait and stability/Reinforce activity limits and safety measures with patient and family/Tailored Fall Risk Interventions/Toileting schedule using arm’s reach rule for commode and bathroom/Use of alarms - bed, chair and/or voice tab/Visual Cue: Yellow wristband and red socks/Bed in lowest position, wheels locked, appropriate side rails in place/Call bell, personal items and telephone in reach/Instruct patient to call for assistance before getting out of bed or chair/Non-slip footwear when patient is out of bed/[place] to call system/Physically safe environment - no spills, clutter or unnecessary equipment/Purposeful Proactive Rounding/Room/bathroom lighting operational, light cord in reach Assistance with ambulation/Assistance OOB with selected safe patient handling equipment/Communicate Risk of Fall with Harm to all staff/Discuss with provider need for PT consult/Monitor for mental status changes/Monitor gait and stability/Reinforce activity limits and safety measures with patient and family/Tailored Fall Risk Interventions/Toileting schedule using arm’s reach rule for commode and bathroom/Use of alarms - bed, chair and/or voice tab/Visual Cue: Yellow wristband and red socks/Bed in lowest position, wheels locked, appropriate side rails in place/Call bell, personal items and telephone in reach/Instruct patient to call for assistance before getting out of bed or chair/Non-slip footwear when patient is out of bed/[place] to call system/Physically safe environment - no spills, clutter or unnecessary equipment/Purposeful Proactive Rounding/Room/bathroom lighting operational, light cord in reach

## 2023-12-12 NOTE — PROGRESS NOTE ADULT - PROBLEM SELECTOR PLAN 7
DVT PPx: SCDs, no pharmacologic given thrombocytopenia  Diet: Regular  Code: Full code  Disposition: PT/OT pending hospital course  Communication: mother Amelie ([phone])    Discharge information:  Pharmacy -   PCP - Dr. Jaswinder Botello DVT PPx: SCDs, no pharmacologic given thrombocytopenia  Diet: Regular  Code: Full code  Disposition: PT/OT pending hospital course  Communication: mother Amelie ([phone])    Discharge information:  Pharmacy -   PCP - Dr. Jaswinder Botello

## 2023-12-12 NOTE — ED ADULT NURSE NOTE - OBJECTIVE STATEMENT
report received from overnight RN. PT came in requesting detox and admits to daily alcohol intake. NAD pt denies SOB, chest pain, dizziness, weakness, urinary symptoms, HA, n/v/d, fevers, chills. respirations are even and un labored. IV placed prior to shift. safety precautions maintained.

## 2023-12-12 NOTE — H&P ADULT - PROBLEM SELECTOR PLAN 4
Likely i/s/o liver dysfunction. Hx of GI bleed 10+ years ago.  > SCDs for DVT ppx given low platelet count and likely liver dysfunction Likely i/s/o liver dysfunction. Hx of GI bleed 10+ years ago.  > SCDs for DVT ppx given low platelet count and likely liver dysfunction  > PLT transfusion for goal >10, >20 if fever, >50 if hemorrhage

## 2023-12-12 NOTE — PROGRESS NOTE ADULT - ATTENDING COMMENTS
Bella Mar MD  Medicine Attending  Teams preferred/Pager: [phone]    I have personally seen and examined patient. I discussed the case with Dr. Bell and agree with findings and plan as detailed per note above. 42 y/o M with hx of alcohol use disorder and anxiety as well as hx of seizure disorder. Pt's CIWA scores initially elevated to 10 but improved to 4. last drink was yesterday morning, 1 bottle of wine. drinks 1-2 bottles daily. denies usually klonipin during this time.     PMH  as noted above    SHx  denies other drug use    Exam   VSS, intermittently tachycardic  tremors noted b/l UE  breathing comfortably  CVS mild tachy  abd: +BS soft NT/ND  ext: moving all extremities     Labs  INR and albumin WNL  mild LFT elevation  platelets low at 80    Plan:  #ETOH withdrawal  -continue with high dose ativan taper  -monitor CIWA closely    #lFTs  mildly elevated  synthetic function preserved    #FEN/GI  -po as tolerated Bella Mar MD  Medicine Attending  Teams preferred/Pager: [phone]    I have personally seen and examined patient. I discussed the case with Dr. Bell and agree with findings and plan as detailed per note above. 42 y/o M with hx of alcohol use disorder and anxiety as well as hx of seizure disorder. Pt's CIWA scores initially elevated to 10 but improved to 4. last drink was yesterday morning, 1 bottle of wine. drinks 1-2 bottles daily. denies usually klonipin during this time.     PMH  as noted above    SHx  denies other drug use    Exam   VSS, intermittently tachycardic  tremors noted b/l UE  breathing comfortably  CVS mild tachy  abd: +BS soft NT/ND  ext: moving all extremities     Labs  INR and albumin WNL  mild LFT elevation  platelets low at 80    Plan:  #ETOH withdrawal  -continue with high dose ativan taper  -monitor CIWA closely    #lFTs  mildly elevated  synthetic function preserved    #FEN/GI  -po as tolerated

## 2023-12-12 NOTE — H&P ADULT - PROBLEM SELECTOR PLAN 2
- Lipase wnl  > SBIRT consult  > cont thiamine 200mg x3 days  > cont folate 1mg qD - Lipase wnl  > SBIRT consult ordered  > cont thiamine 200mg x3 days  > cont folate 1mg qD

## 2023-12-12 NOTE — H&P ADULT - NSHPPHYSICALEXAM_GEN_ALL_CORE
T(C): 36.7 (12-12-23 @ 01:50), Max: 36.7 (12-11-23 @ 23:01)  HR: 112 (12-12-23 @ 01:50) (111 - 149)  BP: 157/84 (12-12-23 @ 01:50) (111/85 - 157/84)  RR: 17 (12-12-23 @ 01:50) (17 - 18)  SpO2: 100% (12-12-23 @ 01:50) (97% - 100%)    GENERAL: disheveled-appearing, NAD, appears comfortable  HEENT: NC/AT, EOMI, no conjunctival icterus, moist mucus membranes  NECK: supple, non-tender, no JVD, no LAD  LUNGS: non-labored breathing, CTAB, no wheezing/rales/rhonchi  CV: tachycardic, no m/r/g, 2+ palpable peripheral pulses bi/l, cap refill <2 secs  ABD: non-distended, soft, non-tender, no rebound tenderness or guarding  : no CVA tenderness  MSK: full ROM, strength 5/5 bi/l  EXT: warm and well-perfused, no peripheral edema  SKIN: no rashes or lesions  NEURO: AAOx3, no focal deficits T(C): 36.7 (12-12-23 @ 01:50), Max: 36.7 (12-11-23 @ 23:01)  HR: 112 (12-12-23 @ 01:50) (111 - 149)  BP: 157/84 (12-12-23 @ 01:50) (111/85 - 157/84)  RR: 17 (12-12-23 @ 01:50) (17 - 18)  SpO2: 100% (12-12-23 @ 01:50) (97% - 100%)    GENERAL: disheveled-appearing, NAD, appears comfortable  HEENT: NC/AT, EOMI, no conjunctival icterus, moist mucus membranes  NECK: supple, non-tender, no JVD, no LAD  LUNGS: non-labored breathing, CTAB, no wheezing/rales/rhonchi  CV: tachycardic, no m/r/g, 2+ palpable peripheral pulses bi/l, cap refill <2 secs  ABD: non-distended, soft, non-tender, no rebound tenderness or guarding  : no CVA tenderness  MSK: full ROM, strength 5/5 bi/l  EXT: warm and well-perfused, no peripheral edema  SKIN: no rashes or lesions  NEURO: AAOx3, no focal deficits, tremors UEs R>L, no asterixis

## 2023-12-12 NOTE — H&P ADULT - PROBLEM SELECTOR PLAN 7
DVT PPx: SCDs, none pharmacologic given thrombocytopenia  Diet: Regular  Code: Full code  Dispo: PT/OT Pending Hospital Course  Communication: mother Amelie ([phone])    Discharge information:  Pharmacy -   PCP - DVT PPx: SCDs, none pharmacologic given thrombocytopenia  Diet: Regular  Code: Full code  Dispo: PT/OT Pending Hospital Course  Communication: mother Amelie ([phone])    Discharge information:  Pharmacy -   PCP - DVT PPx: SCDs, none pharmacologic given thrombocytopenia  Diet: Regular  Code: Full code  Dispo: PT/OT Pending Hospital Course  Communication: mother Amelie ([phone])    Discharge information:  Pharmacy -   PCP - Dr. Jaswinder Botello DVT PPx: SCDs, none pharmacologic given thrombocytopenia  Diet: Regular  Code: Full code  Dispo: PT/OT Pending Hospital Course  Communication: mother Amelie ([phone])    Discharge information:  Pharmacy -   PCP - Dr. Jaswinder Botello

## 2023-12-12 NOTE — PROGRESS NOTE ADULT - PROBLEM SELECTOR PLAN 4
Likely iso liver dysfunction  Hx of GI bleed 10+ years ago    Plan:  -SCDs for DVT ppx given low platelet count and likely liver dysfunction  -Platelet transfusion for goal >10, >20 if fever, >50 if hemorrhage

## 2023-12-12 NOTE — PATIENT PROFILE ADULT - FUNCTIONAL ASSESSMENT - BASIC MOBILITY 1.
"Julisa is requesting Tylenol 3 for headache and \"feeling awful\". Julisa declines regular tylenol, stating that it doesn't work for her. Provider notified.  " 4 = No assist / stand by assistance

## 2023-12-12 NOTE — CHART NOTE - NSCHARTNOTEFT_GEN_A_CORE
Search Terms: Jeronimo Lazaro, 1980Search Date: 12/12/2023 10:53:27 AM  Searching on behalf of: [address] [place] [address]  The Drug Utilization Report below displays all of the controlled substance prescriptions, if any, that your patient has filled in the last twelve months. The information displayed on this report is compiled from pharmacy submissions to the Department, and accurately reflects the information as submitted by the pharmacies.    This report was requested by: Akanksha Mejía | Reference #: [id number]    There are no results for the search terms that you entered. Search Terms: Jeronimo Lazaro, 1980Search Date: 12/12/2023 10:53:27 AM  Searching on behalf of: [address] [place] [address]  The Drug Utilization Report below displays all of the controlled substance prescriptions, if any, that your patient has filled in the last twelve months. The information displayed on this report is compiled from pharmacy submissions to the Department, and accurately reflects the information as submitted by the pharmacies.    This report was requested by: Akanksha Mejía | Reference #: [id number]    There are no results for the search terms that you entered.

## 2023-12-12 NOTE — H&P ADULT - ASSESSMENT
43M hx AUD (with prior MICU admission 2/2 withdrawal seizures) and anxiety (on Clonazepam) admitted for elective alcohol withdrawal detox. High risk Ativan taper started.

## 2023-12-12 NOTE — H&P ADULT - NSHPREVIEWOFSYSTEMS_GEN_ALL_CORE
Constitutional: denies weight loss, fatigue, fevers, chills  Skin: denies rashes or wounds  HEENT: no vision or hearing changes  CV: denies BOYD or TAMAR  Resp: denies SOB or cough  GI: denies diarrhea, constipation, N/V, or changes to appetite  Urinary: denies urgency, dysuria  Neurologic: +tremors of hands R>L. denies headache or pain  MSK: denies arthralgias or myalgias  Hematologic: denies bleeding or easy bruising  Lymphatics: denies LAD or recent infection  Psych: anxious

## 2023-12-13 NOTE — PROGRESS NOTE ADULT - PROBLEM SELECTOR PLAN 5
Likely iso alcoholic hepatitis  Diffuse hepatic steatosis noted on CTA chest 9/2022  FIB4 score 5.1: likely advanced fibrosis    Plan:  -Unable to calculate Maddrey's score given no coags available, f/u in AM  -CTM liver biochemistries till resolution  -Will need hepatology f/u outpatient Likely iso alcoholic hepatitis  Diffuse hepatic steatosis noted on CTA chest 9/2022  FIB4 score 5.1: likely advanced fibrosis    Plan:  -CTM liver biochemistries till resolution  -Will need hepatology f/u outpatient

## 2023-12-13 NOTE — PROGRESS NOTE ADULT - PROBLEM SELECTOR PLAN 7
DVT PPx: SCDs, no pharmacologic given thrombocytopenia  Diet: Regular  Code: Full code  Disposition: PT/OT pending hospital course  Communication: mother Amelie ([phone])    Discharge information:  Pharmacy -   PCP - Dr. Jaswinder Botello DVT PPx: SCDs, no pharmacologic given thrombocytopenia  Diet: Regular  Code: Full code  Disposition: PT/OT pending hospital course  Communication: mother Amelie ([phone])    Discharge information:  Pharmacy -   PCP - Dr. Jaswinder Botello

## 2023-12-13 NOTE — PROGRESS NOTE ADULT - ATTENDING COMMENTS
Bella Mar MD  Medicine Attending  Teams preferred/Pager: [phone]    I have personally seen and examined patient. I discussed the case with Dr. Bell and agree with findings and plan as detailed per note above. 44 y/o M with hx of alcohol use disorder and anxiety as well as hx of seizure disorder. no issues overnight. CIWA scores much improved. still having mild tremors but overall feels better. denies cp, sob, abdominal pain nausea      Labs  notable for decreasing platelets to 49    Plan:  #ETOH withdrawal  -continue with high dose ativan taper  -monitor CIWA closely    #lFTs  mildly elevated  synthetic function preserved    #FEN/GI  -po as tolerated Bella Mar MD  Medicine Attending  Teams preferred/Pager: [phone]    I have personally seen and examined patient. I discussed the case with Dr. Bell and agree with findings and plan as detailed per note above. 44 y/o M with hx of alcohol use disorder and anxiety as well as hx of seizure disorder. no issues overnight. CIWA scores much improved. still having mild tremors but overall feels better. denies cp, sob, abdominal pain nausea      Labs  notable for decreasing platelets to 49    Plan:  #ETOH withdrawal  -continue with high dose ativan taper  -monitor CIWA closely    #lFTs  mildly elevated  synthetic function preserved    #FEN/GI  -po as tolerated

## 2023-12-13 NOTE — PROGRESS NOTE ADULT - SUBJECTIVE AND OBJECTIVE BOX
INTERVAL: No acute overnight events.  SUBJECTIVE: Patient examined bedside this AM.    OBJECTIVE:  Vital Signs Last 24 Hrs  T(C): 36.4 (13 Dec 2023 05:12), Max: 36.8 (12 Dec 2023 08:40)  T(F): 97.6 (13 Dec 2023 05:12), Max: 98.3 (12 Dec 2023 08:40)  HR: 75 (13 Dec 2023 06:22) (73 - 150)  BP: 133/83 (13 Dec 2023 06:22) (118/90 - 144/95)  RR: 16 (13 Dec 2023 06:22) (16 - 18)  SpO2: 99% (13 Dec 2023 06:22) (98% - 100%)    O2 Parameters below as of 13 Dec 2023 06:22  Patient On (Oxygen Delivery Method): room air    CAPILLARY BLOOD GLUCOSE  POCT Blood Glucose.: 158 mg/dL (11 Dec 2023 15:45)    PHYSICAL EXAM:  General: NAD, laying in bed  HEENT: PERRLA, EOMI, sclera non-icteric  Neck: JVD absent  Respiratory: Clear to ascultation bilaterally, no crackles/rales, no accessory muscle use  Cardiovascular: RRR, no murmurs/rubs/gallops  Abdomen: Soft, NT, ND  Extremities: No LE edema  Skin: No rashes or lesions   Neurological: Sensation grossly intact, strength 5/5 in all extremities  Psychiatry: AOx3, appropriate insight/judgement, appropriate affect, recent/remote memory intact    PRN Meds:  acetaminophen     Tablet .. 650 milliGRAM(s) Oral every 6 hours PRN  LORazepam   Injectable 2 milliGRAM(s) IV Push every 2 hours PRN  LORazepam   Injectable 2 milliGRAM(s) IV Push every 1 hour PRN    LABS:                        14.1   3.01  )-----------( 74       ( 12 Dec 2023 07:30 )             40.0     Hgb Trend: 14.1<--, 15.5<--  12-12    140  |  102  |  6<L>  ----------------------------<  77  4.5   |  25  |  0.67    Ca    8.5      12 Dec 2023 07:30  Phos  4.0     12-12  Mg     1.80     12-12    TPro  7.3  /  Alb  4.2  /  TBili  0.8  /  DBili  x   /  AST  97<H>  /  ALT  65<H>  /  AlkPhos  85  12-12    Creatinine Trend: 0.67<--, 0.67<--    PT/INR - ( 12 Dec 2023 07:30 )   PT: 10.7 sec;   INR: 0.95 ratio    PTT - ( 12 Dec 2023 07:30 )  PTT:30.7 sec    Urinalysis Basic - ( 12 Dec 2023 07:30 )  Color: x / Appearance: x / SG: x / pH: x  Gluc: 77 mg/dL / Ketone: x  / Bili: x / Urobili: x   Blood: x / Protein: x / Nitrite: x   Leuk Esterase: x / RBC: x / WBC x   Sq Epi: x / Non Sq Epi: x / Bacteria: x    Venous Blood Gas:  12-11 @ 23:20  7.45/43/55/30/81.2  VBG Lactate: 2.6  Venous Blood Gas:  12-11 @ 20:28  7.46/40/68/28/91.3  VBG Lactate: 2.8 INTERVAL: No acute overnight events.  SUBJECTIVE: Patient examined bedside this AM. Still feeling anxious but overall feels like he is slightly better than when he presented to ED.    OBJECTIVE:  Vital Signs Last 24 Hrs  T(C): 36.4 (13 Dec 2023 05:12), Max: 36.8 (12 Dec 2023 08:40)  T(F): 97.6 (13 Dec 2023 05:12), Max: 98.3 (12 Dec 2023 08:40)  HR: 75 (13 Dec 2023 06:22) (73 - 150)  BP: 133/83 (13 Dec 2023 06:22) (118/90 - 144/95)  RR: 16 (13 Dec 2023 06:22) (16 - 18)  SpO2: 99% (13 Dec 2023 06:22) (98% - 100%)    O2 Parameters below as of 13 Dec 2023 06:22  Patient On (Oxygen Delivery Method): room air    CAPILLARY BLOOD GLUCOSE  POCT Blood Glucose.: 158 mg/dL (11 Dec 2023 15:45)    PHYSICAL EXAM:  General: NAD, laying in bed  HEENT: PERRLA, EOMI, sclera non-icteric  Neck: JVD absent  Respiratory: Clear to ascultation bilaterally, no crackles/rales, no accessory muscle use  Cardiovascular: RRR, no murmurs/rubs/gallops  Abdomen: Soft, NT, ND  Extremities: No LE edema  Skin: No rashes or lesions   Neurological: Sensation grossly intact, strength 5/5 in all extremities  Psychiatry: AOx3, appropriate insight/judgement, appropriate affect, recent/remote memory intact    PRN Meds:  acetaminophen     Tablet .. 650 milliGRAM(s) Oral every 6 hours PRN  LORazepam   Injectable 2 milliGRAM(s) IV Push every 2 hours PRN  LORazepam   Injectable 2 milliGRAM(s) IV Push every 1 hour PRN    LABS:                        14.1   3.01  )-----------( 74       ( 12 Dec 2023 07:30 )             40.0     Hgb Trend: 14.1<--, 15.5<--  12-12    140  |  102  |  6<L>  ----------------------------<  77  4.5   |  25  |  0.67    Ca    8.5      12 Dec 2023 07:30  Phos  4.0     12-12  Mg     1.80     12-12    TPro  7.3  /  Alb  4.2  /  TBili  0.8  /  DBili  x   /  AST  97<H>  /  ALT  65<H>  /  AlkPhos  85  12-12    Creatinine Trend: 0.67<--, 0.67<--    PT/INR - ( 12 Dec 2023 07:30 )   PT: 10.7 sec;   INR: 0.95 ratio    PTT - ( 12 Dec 2023 07:30 )  PTT:30.7 sec    Urinalysis Basic - ( 12 Dec 2023 07:30 )  Color: x / Appearance: x / SG: x / pH: x  Gluc: 77 mg/dL / Ketone: x  / Bili: x / Urobili: x   Blood: x / Protein: x / Nitrite: x   Leuk Esterase: x / RBC: x / WBC x   Sq Epi: x / Non Sq Epi: x / Bacteria: x    Venous Blood Gas:  12-11 @ 23:20  7.45/43/55/30/81.2  VBG Lactate: 2.6  Venous Blood Gas:  12-11 @ 20:28  7.46/40/68/28/91.3  VBG Lactate: 2.8

## 2023-12-14 NOTE — PROGRESS NOTE ADULT - SUBJECTIVE AND OBJECTIVE BOX
INTERVAL: No acute overnight events.  SUBJECTIVE: Patient examined bedside this AM. Still feeling anxious but overall feels like he is slightly better than when he presented to ED.    MEDICATIONS  (STANDING):  folic acid Injectable 1 milliGRAM(s) IV Push daily  lactated ringers. 1000 milliLiter(s) (75 mL/Hr) IV Continuous <Continuous>  LORazepam   Injectable 2 milliGRAM(s) IV Push every 4 hours  LORazepam   Injectable   IV Push   metoprolol succinate ER 25 milliGRAM(s) Oral daily  thiamine IVPB 200 milliGRAM(s) IV Intermittent daily      OBJECTIVE:  Vital Signs Last 24 Hrs  T(C): 36.6 (14 Dec 2023 05:24), Max: 36.8 (13 Dec 2023 15:31)  T(F): 97.8 (14 Dec 2023 05:24), Max: 98.3 (13 Dec 2023 15:31)  HR: 94 (14 Dec 2023 05:24) (78 - 100)  BP: 131/89 (14 Dec 2023 05:24) (115/83 - 131/89)  BP(mean): --  RR: 16 (14 Dec 2023 05:24) (16 - 18)  SpO2: 100% (14 Dec 2023 05:24) (97% - 100%)    Parameters below as of 14 Dec 2023 05:24  Patient On (Oxygen Delivery Method): room air        PHYSICAL EXAM:  General: NAD, laying in bed  HEENT: PERRLA, EOMI, sclera non-icteric  Neck: JVD absent  Respiratory: Clear to ascultation bilaterally, no crackles/rales, no accessory muscle use  Cardiovascular: RRR, no murmurs/rubs/gallops  Abdomen: Soft, NT, ND  Extremities: No LE edema  Skin: No rashes or lesions   Neurological: Sensation grossly intact, strength 5/5 in all extremities  Psychiatry: AOx3, appropriate insight/judgement, appropriate affect, recent/remote memory intact      LABS:                                   13.8   4.08  )-----------( 42       ( 14 Dec 2023 05:30 )             40.2     12-13    137  |  99  |  8   ----------------------------<  91  3.3<L>   |  26  |  0.72    Ca    9.4      13 Dec 2023 06:50  Phos  2.8     12-13  Mg     1.80     12-13    TPro  7.4  /  Alb  4.4  /  TBili  1.7<H>  /  DBili  x   /  AST  74<H>  /  ALT  55<H>  /  AlkPhos  104  12-13      Urinalysis Basic - ( 12 Dec 2023 07:30 )  Color: x / Appearance: x / SG: x / pH: x  Gluc: 77 mg/dL / Ketone: x  / Bili: x / Urobili: x   Blood: x / Protein: x / Nitrite: x   Leuk Esterase: x / RBC: x / WBC x   Sq Epi: x / Non Sq Epi: x / Bacteria: x    Venous Blood Gas:  12-11 @ 23:20  7.45/43/55/30/81.2  VBG Lactate: 2.6  Venous Blood Gas:  12-11 @ 20:28  7.46/40/68/28/91.3  VBG Lactate: 2.8 INTERVAL: No acute overnight events.  SUBJECTIVE: Patient examined bedside this AM. Tremors improved, states that he is having trouble walking    MEDICATIONS  (STANDING):  folic acid Injectable 1 milliGRAM(s) IV Push daily  lactated ringers. 1000 milliLiter(s) (75 mL/Hr) IV Continuous <Continuous>  LORazepam   Injectable 2 milliGRAM(s) IV Push every 4 hours  LORazepam   Injectable   IV Push   metoprolol succinate ER 25 milliGRAM(s) Oral daily  thiamine IVPB 200 milliGRAM(s) IV Intermittent daily      OBJECTIVE:  Vital Signs Last 24 Hrs  T(C): 36.6 (14 Dec 2023 05:24), Max: 36.8 (13 Dec 2023 15:31)  T(F): 97.8 (14 Dec 2023 05:24), Max: 98.3 (13 Dec 2023 15:31)  HR: 94 (14 Dec 2023 05:24) (78 - 100)  BP: 131/89 (14 Dec 2023 05:24) (115/83 - 131/89)  BP(mean): --  RR: 16 (14 Dec 2023 05:24) (16 - 18)  SpO2: 100% (14 Dec 2023 05:24) (97% - 100%)    Parameters below as of 14 Dec 2023 05:24  Patient On (Oxygen Delivery Method): room air        PHYSICAL EXAM:  General: NAD, laying in bed  HEENT: PERRLA, EOMI, sclera non-icteric  Neck: JVD absent  Respiratory: Clear to ascultation bilaterally, no crackles/rales, no accessory muscle use  Cardiovascular: RRR, no murmurs/rubs/gallops  Abdomen: Soft, NT, ND  Extremities: No LE edema  Skin: No rashes or lesions   Neurological: Sensation grossly intact, strength 5/5 in all extremities  Psychiatry: AOx3, appropriate insight/judgement, appropriate affect, recent/remote memory intact      LABS:                                   13.8   4.08  )-----------( 42       ( 14 Dec 2023 05:30 )             40.2     12-14    137  |  100  |  11  ----------------------------<  97  3.7   |  19<L>  |  0.76    Ca    9.4      14 Dec 2023 05:30  Phos  3.2     12-14  Mg     1.80     12-14    TPro  7.6  /  Alb  4.3  /  TBili  1.1  /  DBili  x   /  AST  59<H>  /  ALT  46<H>  /  AlkPhos  105  12-14        Urinalysis Basic - ( 12 Dec 2023 07:30 )  Color: x / Appearance: x / SG: x / pH: x  Gluc: 77 mg/dL / Ketone: x  / Bili: x / Urobili: x   Blood: x / Protein: x / Nitrite: x   Leuk Esterase: x / RBC: x / WBC x   Sq Epi: x / Non Sq Epi: x / Bacteria: x    Venous Blood Gas:  12-11 @ 23:20  7.45/43/55/30/81.2  VBG Lactate: 2.6  Venous Blood Gas:  12-11 @ 20:28  7.46/40/68/28/91.3  VBG Lactate: 2.8

## 2023-12-14 NOTE — PROGRESS NOTE ADULT - PROBLEM SELECTOR PLAN 4
Likely iso liver dysfunction  Hx of GI bleed 10+ years ago    Plan:  -SCDs for DVT ppx given low platelet count and likely liver dysfunction  -Platelet transfusion for goal >10, >20 if fever, >50 if hemorrhage Likely iso alcoholic hepatitis  Diffuse hepatic steatosis noted on CTA chest 9/2022  FIB4 score 5.1: likely advanced fibrosis  US abd this admission again with hepatic steatosis    Plan:  -CTM liver biochemistries till resolution  -Will need hepatology f/u outpatient

## 2023-12-14 NOTE — PROGRESS NOTE ADULT - PROBLEM SELECTOR PLAN 5
Likely iso alcoholic hepatitis  Diffuse hepatic steatosis noted on CTA chest 9/2022  FIB4 score 5.1: likely advanced fibrosis    Plan:  -CTM liver biochemistries till resolution  -Will need hepatology f/u outpatient Tachycardic with leukopenia  Unlikely infectious source, better explained by EtOH withdrawal at this time  Lactate 2.8 on admission    Plan:  -CTM lactate, trend to resolution  -Continue mIVF lactated ringers

## 2023-12-14 NOTE — PROGRESS NOTE ADULT - PROBLEM SELECTOR PLAN 7
DVT PPx: SCDs, no pharmacologic given thrombocytopenia  Diet: Regular  Code: Full code  Disposition: PT/OT pending hospital course  Communication: mother Amelie ([phone])    Discharge information:  Pharmacy -   PCP - Dr. Jaswinder Botello DVT PPx: SCDs, no pharmacologic given thrombocytopenia  Diet: Regular  Code: Full code  Disposition: PT/OT pending hospital course  Communication: mother Amelie ([phone])    Discharge information:  Pharmacy -   PCP - Dr. Jaswinder Botello DVT PPx: SCDs, no pharmacologic given thrombocytopenia  Diet: Regular  Code: Full code  Disposition: PT pending hospital course  Communication: mother Amelie ([phone])    Discharge information:  Pharmacy -   PCP - Dr. Jaswinder Botello DVT PPx: SCDs, no pharmacologic given thrombocytopenia  Diet: Regular  Code: Full code  Disposition: PT pending hospital course  Communication: mother Amelie ([phone])    Discharge information:  Pharmacy -   PCP - Dr. Jaswinder Botello

## 2023-12-14 NOTE — PROGRESS NOTE ADULT - ATTENDING COMMENTS
Bella Mar MD  Medicine Attending  Teams preferred/Pager: [phone]    I have personally seen and examined patient. I discussed the case with Dr. Mejía and agree with findings and plan as detailed per note above. 44 y/o M with hx of alcohol use disorder and anxiety as well as hx of seizure disorder. no issues overnight. CIWA scores much improved. feels much better. biggest issue currently is anxiety. plans to see psych as outpatient      Labs  notable for decreasing platelets to 38. no bleeding    Plan:  #ETOH withdrawal  -continue with high dose ativan taper  -monitor CIWA closely    #lFTs  mildly elevated  synthetic function preserved  -u/s shows steatosis    #thrombocytopenia  -meds reviewed, nothing seems to be affecting it  -likely from etoh  -will check labs in AM to further eval-screen for hep c, Hiv    #FEN/GI  -po as tolerated Bella Mar MD  Medicine Attending  Teams preferred/Pager: [phone]    I have personally seen and examined patient. I discussed the case with Dr. Mejía and agree with findings and plan as detailed per note above. 42 y/o M with hx of alcohol use disorder and anxiety as well as hx of seizure disorder. no issues overnight. CIWA scores much improved. feels much better. biggest issue currently is anxiety. plans to see psych as outpatient      Labs  notable for decreasing platelets to 38. no bleeding    Plan:  #ETOH withdrawal  -continue with high dose ativan taper  -monitor CIWA closely    #lFTs  mildly elevated  synthetic function preserved  -u/s shows steatosis    #thrombocytopenia  -meds reviewed, nothing seems to be affecting it  -likely from etoh  -will check labs in AM to further eval-screen for hep c, Hiv    #FEN/GI  -po as tolerated

## 2023-12-14 NOTE — PROGRESS NOTE ADULT - PROBLEM SELECTOR PLAN 3
Tachycardic with leukopenia  Unlikely infectious source, better explained by EtOH withdrawal at this time  Lactate 2.8 on admission    Plan:  -CTM lactate, trend to resolution  -Continue mIVF lactated ringers Lipase wnl    Plan:  -SBIRT consult ordered  -Continue thiamine 200 mg x3 days  -Continue folate 1 mg daily

## 2023-12-14 NOTE — PROGRESS NOTE ADULT - PROBLEM SELECTOR PLAN 2
Lipase wnl    Plan:  -SBIRT consult ordered  -Continue thiamine 200 mg x3 days  -Continue folate 1 mg daily Likely iso liver dysfunction  Hx of GI bleed 10+ years ago    Plan:  -SCDs for DVT ppx given low platelet count and likely liver dysfunction  -Platelet transfusion for goal >10, >20 if fever, >50 if hemorrhage  -f/u hep C, hiv, and TTP/ITP studies to evaluate for other causes

## 2023-12-14 NOTE — PROVIDER CONTACT NOTE (OTHER) - BACKGROUND
Patient admitted with alcohol dependence with withdrawal. PMH of ETOH abuse and alcohol induced pancreatitis.

## 2023-12-15 NOTE — PROGRESS NOTE ADULT - PROBLEM SELECTOR PLAN 1
2x bottles wine and 2+ beers daily  CIWA 3 to 11 on admission    Plan:  -Ativan high-risk taper and symptom triggered  -CTM with CIWA score, currently 3-4
2x bottles wine and 2+ beers daily  CIWA 3 to 11 on admission    Plan:  -Ativan high-risk taper and symptom triggered  -CTM with CIWA score
2x bottles wine and 2+ beers daily  CIWA 3 to 11 on admission    Plan:  -Ativan high-risk taper and symptom triggered  -CTM with CIWA score, currently 3-4
2x bottles wine and 2+ beers daily  CIWA 3 to 11 on admission    Plan:  -Ativan high-risk taper and symptom triggered  -CTM with CIWA score

## 2023-12-15 NOTE — PROGRESS NOTE ADULT - PROBLEM SELECTOR PLAN 6
Likely worsened iso EtOH withdrawal    Plan:  -Hold home Klonopin given receiving Ativan taper currently  -Continue Ativan taper as above  -Hold home Toprol 25 (given by PMD for "calming effect")

## 2023-12-15 NOTE — SBIRT NOTE ADULT - NSSBIRTALCPOSREINDET_GEN_A_CORE
SW completed SBIRT assessment with pt. Pt reported near daily alcohol use and shared he has been struggling with soberity for many years. SW and pt discussed triggers to use. 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 SW and pt discussed and identified healthy coping skills. Pt shared he sees a psychiatrist and attends AA and has a sponsor. Pt declined additional resources and support.

## 2023-12-15 NOTE — PROGRESS NOTE ADULT - SUBJECTIVE AND OBJECTIVE BOX
INTERVAL:  SUBJECTIVE: Patient examined bedside this AM.    OBJECTIVE:  ICU Vital Signs Last 24 Hrs  T(C): 36.7 (15 Dec 2023 05:45), Max: 36.8 (14 Dec 2023 17:45)  T(F): 98.1 (15 Dec 2023 05:45), Max: 98.2 (14 Dec 2023 17:45)  HR: 90 (15 Dec 2023 05:45) (86 - 106)  BP: 113/82 (15 Dec 2023 05:45) (112/79 - 132/89)  BP(mean): --  ABP: --  ABP(mean): --  RR: 18 (15 Dec 2023 05:45) (16 - 18)  SpO2: 100% (15 Dec 2023 05:45) (98% - 100%)    O2 Parameters below as of 15 Dec 2023 05:45  Patient On (Oxygen Delivery Method): room air              CAPILLARY BLOOD GLUCOSE          PHYSICAL EXAM:  General: NAD, laying in bed  HEENT: PERRLA, EOMI, sclera non-icteric  Neck: JVD absent  Respiratory: Clear to ascultation bilaterally, no crackles/rales, no accessory muscle use  Cardiovascular: RRR, no murmurs/rubs/gallops  Abdomen: Soft, NT, ND  Extremities: No LE edema  Skin: No rashes or lesions   Neurological: Sensation grossly intact, strength 5/5 in all extremities  Psychiatry: AOx3, appropriate insight/judgement, appropriate affect, recent/remote memory intact    PRN Meds:  acetaminophen     Tablet .. 650 milliGRAM(s) Oral every 6 hours PRN  LORazepam   Injectable 2 milliGRAM(s) IV Push every 2 hours PRN  LORazepam   Injectable 2 milliGRAM(s) IV Push every 1 hour PRN      LABS:                        13.8   4.08  )-----------( 42       ( 14 Dec 2023 05:30 )             40.2     Hgb Trend: 13.8<--, 14.2<--, 14.1<--, 15.5<--  12-14    137  |  100  |  11  ----------------------------<  97  3.7   |  19<L>  |  0.76    Ca    9.4      14 Dec 2023 05:30  Phos  3.2     12-14  Mg     1.80     12-14    TPro  7.6  /  Alb  4.3  /  TBili  1.1  /  DBili  x   /  AST  59<H>  /  ALT  46<H>  /  AlkPhos  105  12-14    Creatinine Trend: 0.76<--, 0.72<--, 0.67<--, 0.67<--    Urinalysis Basic - ( 14 Dec 2023 05:30 )    Color: x / Appearance: x / SG: x / pH: x  Gluc: 97 mg/dL / Ketone: x  / Bili: x / Urobili: x   Blood: x / Protein: x / Nitrite: x   Leuk Esterase: x / RBC: x / WBC x   Sq Epi: x / Non Sq Epi: x / Bacteria: x            MICROBIOLOGY:       RADIOLOGY:  [ ] Reviewed and interpreted by me    EKG: INTERVAL: No acute overnight events.   SUBJECTIVE: Patient examined bedside this AM.    OBJECTIVE:  Vital Signs Last 24 Hrs  T(C): 36.7 (15 Dec 2023 05:45), Max: 36.8 (14 Dec 2023 17:45)  T(F): 98.1 (15 Dec 2023 05:45), Max: 98.2 (14 Dec 2023 17:45)  HR: 90 (15 Dec 2023 05:45) (86 - 106)  BP: 113/82 (15 Dec 2023 05:45) (112/79 - 132/89)  RR: 18 (15 Dec 2023 05:45) (16 - 18)  SpO2: 100% (15 Dec 2023 05:45) (98% - 100%)    O2 Parameters below as of 15 Dec 2023 05:45  Patient On (Oxygen Delivery Method): room air    PHYSICAL EXAM:  General: NAD, laying in bed  HEENT: PERRLA, EOMI, sclera non-icteric  Neck: JVD absent  Respiratory: Clear to ascultation bilaterally, no crackles/rales, no accessory muscle use  Cardiovascular: RRR, no murmurs/rubs/gallops  Abdomen: Soft, NT, ND  Extremities: No LE edema  Skin: No rashes or lesions   Neurological: Sensation grossly intact, strength 5/5 in all extremities  Psychiatry: AOx3, appropriate insight/judgement, appropriate affect, recent/remote memory intact    PRN Meds:  acetaminophen     Tablet .. 650 milliGRAM(s) Oral every 6 hours PRN  LORazepam   Injectable 2 milliGRAM(s) IV Push every 2 hours PRN  LORazepam   Injectable 2 milliGRAM(s) IV Push every 1 hour PRN    LABS:                        13.8   4.08  )-----------( 42       ( 14 Dec 2023 05:30 )             40.2     Hgb Trend: 13.8<--, 14.2<--, 14.1<--, 15.5<--  12-14    137  |  100  |  11  ----------------------------<  97  3.7   |  19<L>  |  0.76    Ca    9.4      14 Dec 2023 05:30  Phos  3.2     12-14  Mg     1.80     12-14    TPro  7.6  /  Alb  4.3  /  TBili  1.1  /  DBili  x   /  AST  59<H>  /  ALT  46<H>  /  AlkPhos  105  12-14    Creatinine Trend: 0.76<--, 0.72<--, 0.67<--, 0.67<--    Urinalysis Basic - ( 14 Dec 2023 05:30 )  Color: x / Appearance: x / SG: x / pH: x  Gluc: 97 mg/dL / Ketone: x  / Bili: x / Urobili: x   Blood: x / Protein: x / Nitrite: x   Leuk Esterase: x / RBC: x / WBC x   Sq Epi: x / Non Sq Epi: x / Bacteria: x INTERVAL: No acute overnight events.   SUBJECTIVE: Patient examined bedside this AM. Feeling better, still somewhat anxious but overall feels that he is much better.     OBJECTIVE:  Vital Signs Last 24 Hrs  T(C): 36.7 (15 Dec 2023 05:45), Max: 36.8 (14 Dec 2023 17:45)  T(F): 98.1 (15 Dec 2023 05:45), Max: 98.2 (14 Dec 2023 17:45)  HR: 90 (15 Dec 2023 05:45) (86 - 106)  BP: 113/82 (15 Dec 2023 05:45) (112/79 - 132/89)  RR: 18 (15 Dec 2023 05:45) (16 - 18)  SpO2: 100% (15 Dec 2023 05:45) (98% - 100%)    O2 Parameters below as of 15 Dec 2023 05:45  Patient On (Oxygen Delivery Method): room air    PHYSICAL EXAM:  General: NAD, laying in bed  HEENT: PERRLA, EOMI, sclera non-icteric  Neck: JVD absent  Respiratory: Clear to ascultation bilaterally, no crackles/rales, no accessory muscle use  Cardiovascular: RRR, no murmurs/rubs/gallops  Abdomen: Soft, NT, ND  Extremities: No LE edema  Skin: No rashes or lesions   Neurological: Sensation grossly intact, strength 5/5 in all extremities  Psychiatry: AOx3, appropriate insight/judgement, appropriate affect, recent/remote memory intact    PRN Meds:  acetaminophen     Tablet .. 650 milliGRAM(s) Oral every 6 hours PRN  LORazepam   Injectable 2 milliGRAM(s) IV Push every 2 hours PRN  LORazepam   Injectable 2 milliGRAM(s) IV Push every 1 hour PRN    LABS:                        13.8   4.08  )-----------( 42       ( 14 Dec 2023 05:30 )             40.2     Hgb Trend: 13.8<--, 14.2<--, 14.1<--, 15.5<--  12-14    137  |  100  |  11  ----------------------------<  97  3.7   |  19<L>  |  0.76    Ca    9.4      14 Dec 2023 05:30  Phos  3.2     12-14  Mg     1.80     12-14    TPro  7.6  /  Alb  4.3  /  TBili  1.1  /  DBili  x   /  AST  59<H>  /  ALT  46<H>  /  AlkPhos  105  12-14    Creatinine Trend: 0.76<--, 0.72<--, 0.67<--, 0.67<--    Urinalysis Basic - ( 14 Dec 2023 05:30 )  Color: x / Appearance: x / SG: x / pH: x  Gluc: 97 mg/dL / Ketone: x  / Bili: x / Urobili: x   Blood: x / Protein: x / Nitrite: x   Leuk Esterase: x / RBC: x / WBC x   Sq Epi: x / Non Sq Epi: x / Bacteria: x

## 2023-12-15 NOTE — DISCHARGE NOTE PROVIDER - CARE PROVIDER_API CALL
Isai Whaley  Psychiatry  [address] [id number]  Phone: [phone]  Fax: [phone]  Established Patient  Follow Up Time:    Isai Whaley  Psychiatry  [address] [id number]  Phone: [phone]  Fax: [phone]  Established Patient  Follow Up Time:

## 2023-12-15 NOTE — PHYSICAL THERAPY INITIAL EVALUATION ADULT - PATIENT PROFILE REVIEW, REHAB EVAL
ACTIVITY: Ambulate as Tolerated; spoke with RN Cira Jewell prior to PT evaluation--> Pt OK for PT consult/OOB activity; heart rate 90bpm/yes

## 2023-12-15 NOTE — DISCHARGE NOTE PROVIDER - NSDCCPCAREPLAN_GEN_ALL_CORE_FT
PRINCIPAL DISCHARGE DIAGNOSIS  Diagnosis: Alcohol dependence with withdrawal  Assessment and Plan of Treatment: Alcohol intoxication occurs when the amount of alcohol that a person has consumed impairs his or her ability to mentally and physically function. Alcohol withdrawal occurs when a person suddenly stops drinking alcohol after consuming large amounts of alcohol regularly. Chronic alcohol consumption can also lead to a variety of health issues including neurological disease, stomach disease, heart disease, liver disease, etc. Do not drive after drinking alcohol. Drinking enough alcohol to end up in an Emergency Room or needing treatment for alcohol withdrawal suggests you may have an alcohol abuse problem. Seek help at a drug addiction center.  SEEK IMMEDIATE MEDICAL CARE IF YOU HAVE ANY OF THE FOLLOWING SYMPTOMS: seizures, vomiting blood, blood in your stool, lightheadedness/dizziness, or becoming shaky to tremulous when you stop drinking.

## 2023-12-15 NOTE — PHYSICAL THERAPY INITIAL EVALUATION ADULT - GENERAL OBSERVATIONS, REHAB EVAL
Pt encountered ambulating from bathroom, no distress, AxOx4, with +IV. Pt agreeable to participate in PT evaluation.

## 2023-12-15 NOTE — DISCHARGE NOTE PROVIDER - HOSPITAL COURSE
HPI:  43M hx AUD (with prior MICU admission 2/2 withdrawal seizures) and anxiety (on Clonazepam) presented to [facility] ED for elective EtOH withdrawal detox. Patient's last drink 12/11 AM (x1 wine bottle), wishes to detox to stop "letting others down." Previously drinking 2x wine bottle per day and 2+ beers. Multiple admissions for alcohol withdrawal in the past, with one MICU stay for seizures. Most recent hospitalization 9/2023. Discharged and relapsed within weeks. Currently denies CP, SOB, abd pain, N/V. No blood in stool. Notes a fine tremor not present at baseline. Otherwise denies headaches, numbness/tingling, tactile disturbances, AH/VH, agitation.    In the ED, patient afebrile, tachycardic 110s-140s, and hemodynamically stable, saturating well on room air. CIWA 7 at time of interview. Labs remarkable for EtOH 383, WBC 3.3, PLT 89, AG 21, lactate 2.8 on VBG, AST/ALT elevation 82/66 (mildly hemolyzed), lipase neg. Imaging with CXR showing clear lungs. Received Ativan 1mg, Librium 50mg, thiamine 100mg x1, 1L NS. (12 Dec 2023 02:54)    Hospital Course:  The patient was managed with Ativan taper and symptom triggered Ativan CIWA protocol, with subsequent improvement over several day course. The patient remained hemodynamically stable without seizure throughout hospital course. He was evaluated by physical therapy on 12/15 with recommendation for outpatient physical therapy.     Important Medication Changes and Reason:  No changes    Active or Pending Issues Requiring Follow-up:  -Please follow up with your primary care doctor  -Please follow up with your psychiatrist     Advanced Directives:   [X] Full code  [ ] DNR  [ ] Hospice    Discharge Diagnoses:  Alcohol withdrawal       HPI:  43M hx AUD (with prior MICU admission 2/2 withdrawal seizures) and anxiety (on Clonazepam) presented to [facility] ED for elective EtOH withdrawal detox. Patient's last drink 12/11 AM (x1 wine bottle), wishes to detox to stop "letting others down." Previously drinking 2x wine bottle per day and 2+ beers. Multiple admissions for alcohol withdrawal in the past, with one MICU stay for seizures. Most recent hospitalization 9/2023. Discharged and relapsed within weeks. Currently denies CP, SOB, abd pain, N/V. No blood in stool. Notes a fine tremor not present at baseline. Otherwise denies headaches, numbness/tingling, tactile disturbances, AH/VH, agitation.    In the ED, patient afebrile, tachycardic 110s-140s, and hemodynamically stable, saturating well on room air. CIWA 7 at time of interview. Labs remarkable for EtOH 383, WBC 3.3, PLT 89, AG 21, lactate 2.8 on VBG, AST/ALT elevation 82/66 (mildly hemolyzed), lipase neg. Imaging with CXR showing clear lungs. Received Ativan 1mg, Librium 50mg, thiamine 100mg x1, 1L NS. (12 Dec 2023 02:54)    Hospital Course:  The patient was managed with Ativan taper and symptom triggered Ativan CIWA protocol, with subsequent improvement over several day course. The patient remained hemodynamically stable without seizure throughout hospital course. He was evaluated by physical therapy on 12/15 with recommendation for outpatient physical therapy.     Important Medication Changes and Reason:  No changes    Active or Pending Issues Requiring Follow-up:  -Please follow up with your primary care doctor  -Please follow up with your psychiatrist     Advanced Directives:   [X] Full code  [ ] DNR  [ ] Hospice    Discharge Diagnoses:  Alcohol withdrawal       HPI:  43M hx AUD (with prior MICU admission 2/2 withdrawal seizures) and anxiety (on Clonazepam) presented to [facility] ED for elective EtOH withdrawal detox. Patient's last drink 12/11 AM (x1 wine bottle), wishes to detox to stop "letting others down." Previously drinking 2x wine bottle per day and 2+ beers. Multiple admissions for alcohol withdrawal in the past, with one MICU stay for seizures. Most recent hospitalization 9/2023. Discharged and relapsed within weeks. Currently denies CP, SOB, abd pain, N/V. No blood in stool. Notes a fine tremor not present at baseline. Otherwise denies headaches, numbness/tingling, tactile disturbances, AH/VH, agitation.    In the ED, patient afebrile, tachycardic 110s-140s, and hemodynamically stable, saturating well on room air. CIWA 7 at time of interview. Labs remarkable for EtOH 383, WBC 3.3, PLT 89, AG 21, lactate 2.8 on VBG, AST/ALT elevation 82/66 (mildly hemolyzed), lipase neg. Imaging with CXR showing clear lungs. Received Ativan 1mg, Librium 50mg, thiamine 100mg x1, 1L NS. (12 Dec 2023 02:54)    Hospital Course:  The patient was managed with Ativan taper and symptom triggered Ativan CIWA protocol, with subsequent improvement over several day course. The patient remained hemodynamically stable without seizure throughout hospital course. He was evaluated by physical therapy on 12/15 with recommendation for outpatient physical therapy.     Active or Pending Issues Requiring Follow-up:  -Please follow up with your primary care doctor  -Please follow up with your psychiatrist     Advanced Directives:   [X] Full code  [ ] DNR  [ ] Hospice    Discharge Diagnoses:  Alcohol withdrawal       HPI:  43M hx AUD (with prior MICU admission 2/2 withdrawal seizures) and anxiety (on Clonazepam) presented to [facility] ED for elective EtOH withdrawal detox. Patient's last drink 12/11 AM (x1 wine bottle), wishes to detox to stop "letting others down." Previously drinking 2x wine bottle per day and 2+ beers. Multiple admissions for alcohol withdrawal in the past, with one MICU stay for seizures. Most recent hospitalization 9/2023. Discharged and relapsed within weeks. Currently denies CP, SOB, abd pain, N/V. No blood in stool. Notes a fine tremor not present at baseline. Otherwise denies headaches, numbness/tingling, tactile disturbances, AH/VH, agitation.    In the ED, patient afebrile, tachycardic 110s-140s, and hemodynamically stable, saturating well on room air. CIWA 7 at time of interview. Labs remarkable for EtOH 383, WBC 3.3, PLT 89, AG 21, lactate 2.8 on VBG, AST/ALT elevation 82/66 (mildly hemolyzed), lipase neg. Imaging with CXR showing clear lungs. Received Ativan 1mg, Librium 50mg, thiamine 100mg x1, 1L NS. (12 Dec 2023 02:54)    Hospital Course:  The patient was managed with Ativan taper and symptom triggered Ativan CIWA protocol, with subsequent improvement over several day course. The patient remained hemodynamically stable without seizure throughout hospital course. He was evaluated by physical therapy on 12/15 with recommendation for outpatient physical therapy.     Active or Pending Issues Requiring Follow-up:  -Please follow up with your primary care doctor  -Please follow up with your psychiatrist     Advanced Directives:   [X] Full code  [ ] DNR  [ ] Hospice    Discharge Diagnoses:  Alcohol withdrawal       HPI:  43M hx AUD (with prior MICU admission 2/2 withdrawal seizures) and anxiety (on Clonazepam) presented to [facility] ED for elective EtOH withdrawal detox. Patient's last drink 12/11 AM (x1 wine bottle), wishes to detox to stop "letting others down." Previously drinking 2x wine bottle per day and 2+ beers. Multiple admissions for alcohol withdrawal in the past, with one MICU stay for seizures. Most recent hospitalization 9/2023. Discharged and relapsed within weeks. Currently denies CP, SOB, abd pain, N/V. No blood in stool. Notes a fine tremor not present at baseline. Otherwise denies headaches, numbness/tingling, tactile disturbances, AH/VH, agitation.    In the ED, patient afebrile, tachycardic 110s-140s, and hemodynamically stable, saturating well on room air. CIWA 7 at time of interview. Labs remarkable for EtOH 383, WBC 3.3, PLT 89, AG 21, lactate 2.8 on VBG, AST/ALT elevation 82/66 (mildly hemolyzed), lipase neg. Imaging with CXR showing clear lungs. Received Ativan 1mg, Librium 50mg, thiamine 100mg x1, 1L NS. (12 Dec 2023 02:54)    Hospital Course:  The patient was managed with Ativan taper and symptom triggered Ativan CIWA protocol, with subsequent improvement over several day course. The patient remained hemodynamically stable without seizure throughout hospital course. He was evaluated by physical therapy on 12/15 with recommendation for outpatient physical therapy.     Active or Pending Issues Requiring Follow-up:  -Please follow up with your primary care doctor  -Please follow up with your psychiatrist     Advanced Directives:   [X] Full code  [ ] DNR  [ ] Hospice    Discharge Diagnoses:  Alcohol withdrawal    Bella Mar MD  Medicine Attending  Teams preferred/Pager: [phone]    I have personally seen and examined patient. I discussed the case with Dr. Bell and agree with findings and plan as detailed per note above. 44 y/o M with hx of etoh abuse admitted for ETOH withdrawal. Placed on Ativan taper. Symptoms improved, tolerating PO. Pt will be discharged with 2-3 tabs of Ativan. He was encouraged to f/u with psych as outpatient. ISTOP done, no recent scripts for klonipin (although Pt reports taking). stable for discharge    >33 min have been spent in the care and coordination of this patient's care     HPI:  43M hx AUD (with prior MICU admission 2/2 withdrawal seizures) and anxiety (on Clonazepam) presented to [facility] ED for elective EtOH withdrawal detox. Patient's last drink 12/11 AM (x1 wine bottle), wishes to detox to stop "letting others down." Previously drinking 2x wine bottle per day and 2+ beers. Multiple admissions for alcohol withdrawal in the past, with one MICU stay for seizures. Most recent hospitalization 9/2023. Discharged and relapsed within weeks. Currently denies CP, SOB, abd pain, N/V. No blood in stool. Notes a fine tremor not present at baseline. Otherwise denies headaches, numbness/tingling, tactile disturbances, AH/VH, agitation.    In the ED, patient afebrile, tachycardic 110s-140s, and hemodynamically stable, saturating well on room air. CIWA 7 at time of interview. Labs remarkable for EtOH 383, WBC 3.3, PLT 89, AG 21, lactate 2.8 on VBG, AST/ALT elevation 82/66 (mildly hemolyzed), lipase neg. Imaging with CXR showing clear lungs. Received Ativan 1mg, Librium 50mg, thiamine 100mg x1, 1L NS. (12 Dec 2023 02:54)    Hospital Course:  The patient was managed with Ativan taper and symptom triggered Ativan CIWA protocol, with subsequent improvement over several day course. The patient remained hemodynamically stable without seizure throughout hospital course. He was evaluated by physical therapy on 12/15 with recommendation for outpatient physical therapy.     Active or Pending Issues Requiring Follow-up:  -Please follow up with your primary care doctor  -Please follow up with your psychiatrist     Advanced Directives:   [X] Full code  [ ] DNR  [ ] Hospice    Discharge Diagnoses:  Alcohol withdrawal    Bella Mar MD  Medicine Attending  Teams preferred/Pager: [phone]    I have personally seen and examined patient. I discussed the case with Dr. Bell and agree with findings and plan as detailed per note above. 44 y/o M with hx of etoh abuse admitted for ETOH withdrawal. Placed on Ativan taper. Symptoms improved, tolerating PO. Pt will be discharged with 2-3 tabs of Ativan. He was encouraged to f/u with psych as outpatient. ISTOP done, no recent scripts for klonipin (although Pt reports taking). stable for discharge    >33 min have been spent in the care and coordination of this patient's care

## 2023-12-15 NOTE — DISCHARGE NOTE NURSING/CASE MANAGEMENT/SOCIAL WORK - PATIENT PORTAL LINK FT
You can access the FollowMyHealth Patient Portal offered by [facility] by registering at the following website: [email or web address]. By joining [employer]’s FollowMyHealth portal, you will also be able to view your health information using other applications (apps) compatible with our system. You can access the FollowMyHealth Patient Portal offered by [facility] by registering at the following website: [email or web address]. By joining [employer]’s FollowMyHealth portal, you will also be able to view your health information using other applications (apps) compatible with our system.

## 2023-12-15 NOTE — PROGRESS NOTE ADULT - PROBLEM SELECTOR PLAN 7
DVT PPx: SCDs, no pharmacologic given thrombocytopenia  Diet: Regular  Code: Full code  Disposition: PT pending hospital course  Communication: mother Amelie ([phone])    Discharge information:  Pharmacy -   PCP - Dr. Jaswinder Botello DVT PPx: SCDs, no pharmacologic given thrombocytopenia  Diet: Regular  Code: Full code  Disposition: PT pending hospital course  Communication: mother Amelie ([phone])    Discharge information:  Pharmacy -   PCP - Dr. Jaswinder Botello DVT PPx: SCDs, no pharmacologic given thrombocytopenia  Diet: Regular  Code: Full code  Disposition: PT pending hospital course  Communication: mother Amelie ([phone])    Discharge information:  PCP - Dr. Jaswinder Botello DVT PPx: SCDs, no pharmacologic given thrombocytopenia  Diet: Regular  Code: Full code  Disposition: PT pending hospital course  Communication: mother Amelie ([phone])    Discharge information:  PCP - Dr. Jaswinder Botello

## 2023-12-15 NOTE — DISCHARGE NOTE PROVIDER - NSDCMRMEDTOKEN_GEN_ALL_CORE_FT
Klonopin:   Metoprolol Succinate ER 25 mg oral tablet, extended release: 1 tab(s) orally once a day   Metoprolol Succinate ER 25 mg oral tablet, extended release: 1 tab(s) orally once a day   Ativan 1 mg oral tablet: 1 tab(s) orally Please take one 1 mg tablet four hours after you leave the hospital, followed by a half tablet (0.5 mg) four hours after that dose. Then take a half tablet (0.5 mg) every 12 hours for three doses. MDD: 1 mg  Metoprolol Succinate ER 25 mg oral tablet, extended release: 1 tab(s) orally once a day

## 2023-12-15 NOTE — PHYSICAL THERAPY INITIAL EVALUATION ADULT - PERTINENT HX OF CURRENT PROBLEM, REHAB EVAL
43M hx AUD (with prior MICU admission 2/2 withdrawal seizures) and anxiety (on Clonazepam) admitted for elective alcohol withdrawal detox. High risk Ativan taper started. US abdomen upper quadrant right IMPRESSION: Hepatic steatosis. No sonographic evidence of cirrhosis.

## 2023-12-15 NOTE — PHYSICAL THERAPY INITIAL EVALUATION ADULT - ADDITIONAL COMMENTS
Pt reports that he lives alone in an apartment with 4 steps to enter; (+)bilateral handrails; bedroom is on the first floor. Prior to hospital admission, pt was completely independent and used no assistive device with ambulation. Pt denies any recent falls.    Pt left comfortable seated @ edge of bed, NAD, all lines intact, all precautions maintained, with call bell in reach, and RN aware of PT evaluation.

## 2023-12-15 NOTE — DISCHARGE NOTE NURSING/CASE MANAGEMENT/SOCIAL WORK - NSDCPEFALRISK_GEN_ALL_CORE
For information on Fall & Injury Prevention, visit: [email or web address] OR  [email or web address] OR  https://www.cdc.gov/steadi/patient.html For information on Fall & Injury Prevention, visit: [email or web address] OR  [email or web address] OR  https://www.cdc.gov/steadi/patient.html

## 2023-12-15 NOTE — PROGRESS NOTE ADULT - PROBLEM SELECTOR PLAN 4
Likely iso alcoholic hepatitis  Diffuse hepatic steatosis noted on CTA chest 9/2022  FIB4 score 5.1: likely advanced fibrosis  US abd this admission again with hepatic steatosis    Plan:  -CTM liver biochemistries till resolution  -Will need hepatology f/u outpatient

## 2023-12-15 NOTE — PROGRESS NOTE ADULT - ASSESSMENT
43 M with PMH of AUD (with prior MICU admission 2/2 withdrawal seizures) and anxiety (on clonazepam) admitted for elective alcohol withdrawal detox, with high risk Ativan taper started.

## 2023-12-15 NOTE — PROGRESS NOTE ADULT - PROBLEM SELECTOR PLAN 2
Likely iso liver dysfunction  Hx of GI bleed 10+ years ago    Plan:  -SCDs for DVT ppx given low platelet count and likely liver dysfunction  -Platelet transfusion for goal >10, >20 if fever, >50 if hemorrhage  -f/u hep C, hiv, and TTP/ITP studies to evaluate for other causes Likely iso liver dysfunction  Hx of GI bleed 10+ years ago    Plan:  -SCDs for DVT ppx given low platelet count and likely liver dysfunction  -Platelet transfusion for goal >10, >20 if fever, >50 if hemorrhage  -f/u hep C, HIV, and TTP/ITP studies to evaluate for other causes

## 2024-01-01 ENCOUNTER — RESULT REVIEW (OUTPATIENT)
Age: 44
End: 2024-01-01

## 2024-01-01 ENCOUNTER — INPATIENT (INPATIENT)
Facility: HOSPITAL | Age: 44
LOS: 2 days | DRG: 85 | End: 2024-04-10
Attending: NEUROLOGICAL SURGERY | Admitting: NEUROLOGICAL SURGERY
Payer: SELF-PAY

## 2024-01-01 ENCOUNTER — EMERGENCY (EMERGENCY)
Facility: HOSPITAL | Age: 44
LOS: 1 days | Discharge: HOME | End: 2024-01-01
Attending: EMERGENCY MEDICINE | Admitting: EMERGENCY MEDICINE
Payer: MEDICAID

## 2024-01-01 VITALS
HEIGHT: 71 IN | RESPIRATION RATE: 18 BRPM | TEMPERATURE: 98 F | OXYGEN SATURATION: 95 % | HEART RATE: 130 BPM | SYSTOLIC BLOOD PRESSURE: 125 MMHG | DIASTOLIC BLOOD PRESSURE: 82 MMHG

## 2024-01-01 VITALS
DIASTOLIC BLOOD PRESSURE: 67 MMHG | HEART RATE: 113 BPM | TEMPERATURE: 98 F | SYSTOLIC BLOOD PRESSURE: 100 MMHG | OXYGEN SATURATION: 96 % | RESPIRATION RATE: 18 BRPM

## 2024-01-01 VITALS
OXYGEN SATURATION: 98 % | SYSTOLIC BLOOD PRESSURE: 126 MMHG | DIASTOLIC BLOOD PRESSURE: 83 MMHG | HEIGHT: 68 IN | HEART RATE: 133 BPM | TEMPERATURE: 98 F | WEIGHT: 184.97 LBS | RESPIRATION RATE: 20 BRPM

## 2024-01-01 VITALS — OXYGEN SATURATION: 100 % | HEART RATE: 66 BPM

## 2024-01-01 DIAGNOSIS — I61.9 NONTRAUMATIC INTRACEREBRAL HEMORRHAGE, UNSPECIFIED: ICD-10-CM

## 2024-01-01 DIAGNOSIS — Z71.89 OTHER SPECIFIED COUNSELING: ICD-10-CM

## 2024-01-01 DIAGNOSIS — J96.01 ACUTE RESPIRATORY FAILURE WITH HYPOXIA: ICD-10-CM

## 2024-01-01 DIAGNOSIS — R53.2 FUNCTIONAL QUADRIPLEGIA: ICD-10-CM

## 2024-01-01 DIAGNOSIS — G93.40 ENCEPHALOPATHY, UNSPECIFIED: ICD-10-CM

## 2024-01-01 DIAGNOSIS — I60.9 NONTRAUMATIC SUBARACHNOID HEMORRHAGE, UNSPECIFIED: ICD-10-CM

## 2024-01-01 LAB
A1C WITH ESTIMATED AVERAGE GLUCOSE RESULT: 4.5 % — SIGNIFICANT CHANGE UP (ref 4–5.6)
ADD ON TEST-SPECIMEN IN LAB: SIGNIFICANT CHANGE UP
ALBUMIN SERPL ELPH-MCNC: 3.7 G/DL — SIGNIFICANT CHANGE UP (ref 3.3–5)
ALBUMIN SERPL ELPH-MCNC: 3.9 G/DL — SIGNIFICANT CHANGE UP (ref 3.3–5)
ALBUMIN SERPL ELPH-MCNC: 4.1 G/DL — SIGNIFICANT CHANGE UP (ref 3.3–5)
ALBUMIN SERPL ELPH-MCNC: 4.5 G/DL — SIGNIFICANT CHANGE UP (ref 3.3–5)
ALBUMIN SERPL ELPH-MCNC: 4.6 G/DL — SIGNIFICANT CHANGE UP (ref 3.3–5)
ALBUMIN SERPL ELPH-MCNC: 4.8 G/DL — SIGNIFICANT CHANGE UP (ref 3.3–5)
ALP SERPL-CCNC: 113 U/L — SIGNIFICANT CHANGE UP (ref 40–120)
ALP SERPL-CCNC: 80 U/L — SIGNIFICANT CHANGE UP (ref 40–120)
ALP SERPL-CCNC: 80 U/L — SIGNIFICANT CHANGE UP (ref 40–120)
ALP SERPL-CCNC: 85 U/L — SIGNIFICANT CHANGE UP (ref 40–120)
ALP SERPL-CCNC: 96 U/L — SIGNIFICANT CHANGE UP (ref 40–120)
ALP SERPL-CCNC: 99 U/L — SIGNIFICANT CHANGE UP (ref 40–120)
ALT FLD-CCNC: 152 U/L — HIGH (ref 10–45)
ALT FLD-CCNC: 174 U/L — HIGH (ref 10–45)
ALT FLD-CCNC: 216 U/L — HIGH (ref 10–45)
ALT FLD-CCNC: 86 U/L — HIGH (ref 10–45)
ALT FLD-CCNC: 86 U/L — HIGH (ref 10–45)
ALT FLD-CCNC: 99 U/L — HIGH (ref 10–45)
AMPHET UR-MCNC: NEGATIVE — SIGNIFICANT CHANGE UP
AMYLASE P1 CFR SERPL: 86 U/L — SIGNIFICANT CHANGE UP (ref 25–125)
AMYLASE P1 CFR SERPL: 87 U/L — SIGNIFICANT CHANGE UP (ref 25–125)
ANION GAP SERPL CALC-SCNC: 14 MMOL/L — SIGNIFICANT CHANGE UP (ref 5–17)
ANION GAP SERPL CALC-SCNC: 14 MMOL/L — SIGNIFICANT CHANGE UP (ref 5–17)
ANION GAP SERPL CALC-SCNC: 15 MMOL/L — SIGNIFICANT CHANGE UP (ref 5–17)
ANION GAP SERPL CALC-SCNC: 15 MMOL/L — SIGNIFICANT CHANGE UP (ref 5–17)
ANION GAP SERPL CALC-SCNC: 16 MMOL/L — SIGNIFICANT CHANGE UP (ref 5–17)
ANION GAP SERPL CALC-SCNC: 18 MMOL/L — HIGH (ref 5–17)
ANION GAP SERPL CALC-SCNC: 23 MMOL/L — HIGH (ref 5–17)
ANION GAP SERPL CALC-SCNC: 24 MMOL/L — HIGH (ref 5–17)
ANION GAP SERPL CALC-SCNC: 25 MMOL/L — HIGH (ref 5–17)
ANION GAP SERPL CALC-SCNC: 27 MMOL/L — HIGH (ref 5–17)
ANION GAP SERPL CALC-SCNC: 28 MMOL/L — HIGH (ref 5–17)
APAP SERPL-MCNC: <15 UG/ML — SIGNIFICANT CHANGE UP (ref 10–30)
APAP SERPL-MCNC: <15 UG/ML — SIGNIFICANT CHANGE UP (ref 10–30)
APPEARANCE UR: CLEAR — SIGNIFICANT CHANGE UP
APTT BLD: 24.9 SEC — SIGNIFICANT CHANGE UP (ref 24.5–35.6)
APTT BLD: 25.4 SEC — SIGNIFICANT CHANGE UP (ref 24.5–35.6)
APTT BLD: 25.5 SEC — SIGNIFICANT CHANGE UP (ref 24.5–35.6)
APTT BLD: 26.4 SEC — SIGNIFICANT CHANGE UP (ref 24.5–35.6)
APTT BLD: 26.6 SEC — SIGNIFICANT CHANGE UP (ref 24.5–35.6)
APTT BLD: 27.3 SEC — SIGNIFICANT CHANGE UP (ref 24.5–35.6)
APTT BLD: 30 SEC — SIGNIFICANT CHANGE UP (ref 24.5–35.6)
AST SERPL-CCNC: 215 U/L — HIGH (ref 10–40)
AST SERPL-CCNC: 249 U/L — HIGH (ref 10–40)
AST SERPL-CCNC: 342 U/L — HIGH (ref 10–40)
AST SERPL-CCNC: 63 U/L — HIGH (ref 10–40)
AST SERPL-CCNC: 64 U/L — HIGH (ref 10–40)
AST SERPL-CCNC: 84 U/L — HIGH (ref 10–40)
B-OH-BUTYR SERPL-SCNC: 2.1 MMOL/L — HIGH
B-OH-BUTYR SERPL-SCNC: 4.6 MMOL/L — HIGH
BACTERIA # UR AUTO: ABNORMAL /HPF
BACTERIA # UR AUTO: NEGATIVE /HPF — SIGNIFICANT CHANGE UP
BACTERIA # UR AUTO: NEGATIVE /HPF — SIGNIFICANT CHANGE UP
BARBITURATES UR SCN-MCNC: NEGATIVE — SIGNIFICANT CHANGE UP
BASE EXCESS BLDV CALC-SCNC: 1.6 MMOL/L — SIGNIFICANT CHANGE UP (ref -2–3)
BASOPHILS # BLD AUTO: 0 K/UL — SIGNIFICANT CHANGE UP (ref 0–0.2)
BASOPHILS # BLD AUTO: 0.01 K/UL — SIGNIFICANT CHANGE UP (ref 0–0.2)
BASOPHILS # BLD AUTO: 0.02 K/UL — SIGNIFICANT CHANGE UP (ref 0–0.2)
BASOPHILS # BLD AUTO: 0.02 K/UL — SIGNIFICANT CHANGE UP (ref 0–0.2)
BASOPHILS # BLD AUTO: 0.03 K/UL — SIGNIFICANT CHANGE UP (ref 0–0.2)
BASOPHILS NFR BLD AUTO: 0 % — SIGNIFICANT CHANGE UP (ref 0–2)
BASOPHILS NFR BLD AUTO: 0.3 % — SIGNIFICANT CHANGE UP (ref 0–2)
BASOPHILS NFR BLD AUTO: 0.4 % — SIGNIFICANT CHANGE UP (ref 0–2)
BASOPHILS NFR BLD AUTO: 0.4 % — SIGNIFICANT CHANGE UP (ref 0–2)
BASOPHILS NFR BLD AUTO: 0.5 % — SIGNIFICANT CHANGE UP (ref 0–2)
BASOPHILS NFR BLD AUTO: 0.6 % — SIGNIFICANT CHANGE UP (ref 0–2)
BASOPHILS NFR BLD AUTO: 0.9 % — SIGNIFICANT CHANGE UP (ref 0–2)
BENZODIAZ UR-MCNC: POSITIVE
BILIRUB DIRECT SERPL-MCNC: 0.6 MG/DL — HIGH (ref 0–0.3)
BILIRUB DIRECT SERPL-MCNC: 0.6 MG/DL — HIGH (ref 0–0.3)
BILIRUB DIRECT SERPL-MCNC: 0.7 MG/DL — HIGH (ref 0–0.3)
BILIRUB INDIRECT FLD-MCNC: 0.9 MG/DL — SIGNIFICANT CHANGE UP (ref 0.2–1)
BILIRUB INDIRECT FLD-MCNC: 1 MG/DL — SIGNIFICANT CHANGE UP (ref 0.2–1)
BILIRUB INDIRECT FLD-MCNC: 1 MG/DL — SIGNIFICANT CHANGE UP (ref 0.2–1)
BILIRUB SERPL-MCNC: 1.4 MG/DL — HIGH (ref 0.2–1.2)
BILIRUB SERPL-MCNC: 1.5 MG/DL — HIGH (ref 0.2–1.2)
BILIRUB SERPL-MCNC: 1.6 MG/DL — HIGH (ref 0.2–1.2)
BILIRUB SERPL-MCNC: 1.7 MG/DL — HIGH (ref 0.2–1.2)
BILIRUB SERPL-MCNC: 1.9 MG/DL — HIGH (ref 0.2–1.2)
BILIRUB SERPL-MCNC: 2 MG/DL — HIGH (ref 0.2–1.2)
BILIRUB UR-MCNC: NEGATIVE — SIGNIFICANT CHANGE UP
BLD GP AB SCN SERPL QL: NEGATIVE — SIGNIFICANT CHANGE UP
BLD GP AB SCN SERPL QL: NEGATIVE — SIGNIFICANT CHANGE UP
BUN SERPL-MCNC: 12 MG/DL — SIGNIFICANT CHANGE UP (ref 7–23)
BUN SERPL-MCNC: 12 MG/DL — SIGNIFICANT CHANGE UP (ref 7–23)
BUN SERPL-MCNC: 13 MG/DL — SIGNIFICANT CHANGE UP (ref 7–23)
BUN SERPL-MCNC: 5 MG/DL — LOW (ref 7–23)
BUN SERPL-MCNC: 5 MG/DL — LOW (ref 7–23)
BUN SERPL-MCNC: 6 MG/DL — LOW (ref 7–23)
BUN SERPL-MCNC: 7 MG/DL — SIGNIFICANT CHANGE UP (ref 7–23)
BUN SERPL-MCNC: 8 MG/DL — SIGNIFICANT CHANGE UP (ref 7–23)
BUN SERPL-MCNC: 9 MG/DL — SIGNIFICANT CHANGE UP (ref 7–23)
CA-I SERPL-SCNC: 1.06 MMOL/L — LOW (ref 1.15–1.33)
CALCIUM SERPL-MCNC: 8.3 MG/DL — LOW (ref 8.4–10.5)
CALCIUM SERPL-MCNC: 8.3 MG/DL — LOW (ref 8.4–10.5)
CALCIUM SERPL-MCNC: 8.7 MG/DL — SIGNIFICANT CHANGE UP (ref 8.4–10.5)
CALCIUM SERPL-MCNC: 8.8 MG/DL — SIGNIFICANT CHANGE UP (ref 8.4–10.5)
CALCIUM SERPL-MCNC: 8.9 MG/DL — SIGNIFICANT CHANGE UP (ref 8.4–10.5)
CALCIUM SERPL-MCNC: 9.1 MG/DL — SIGNIFICANT CHANGE UP (ref 8.4–10.5)
CALCIUM SERPL-MCNC: 9.2 MG/DL — SIGNIFICANT CHANGE UP (ref 8.4–10.5)
CALCIUM SERPL-MCNC: 9.2 MG/DL — SIGNIFICANT CHANGE UP (ref 8.4–10.5)
CALCIUM SERPL-MCNC: 9.4 MG/DL — SIGNIFICANT CHANGE UP (ref 8.4–10.5)
CALCIUM SERPL-MCNC: 9.5 MG/DL — SIGNIFICANT CHANGE UP (ref 8.4–10.5)
CALCIUM SERPL-MCNC: 9.5 MG/DL — SIGNIFICANT CHANGE UP (ref 8.4–10.5)
CAST: 0 /LPF — SIGNIFICANT CHANGE UP (ref 0–4)
CAST: 0 /LPF — SIGNIFICANT CHANGE UP (ref 0–4)
CAST: 2 /LPF — SIGNIFICANT CHANGE UP (ref 0–4)
CHLORIDE BLDV-SCNC: 89 MMOL/L — LOW (ref 96–108)
CHLORIDE SERPL-SCNC: 100 MMOL/L — SIGNIFICANT CHANGE UP (ref 96–108)
CHLORIDE SERPL-SCNC: 85 MMOL/L — LOW (ref 96–108)
CHLORIDE SERPL-SCNC: 87 MMOL/L — LOW (ref 96–108)
CHLORIDE SERPL-SCNC: 87 MMOL/L — LOW (ref 96–108)
CHLORIDE SERPL-SCNC: 89 MMOL/L — LOW (ref 96–108)
CHLORIDE SERPL-SCNC: 91 MMOL/L — LOW (ref 96–108)
CHLORIDE SERPL-SCNC: 91 MMOL/L — LOW (ref 96–108)
CHLORIDE SERPL-SCNC: 93 MMOL/L — LOW (ref 96–108)
CHLORIDE SERPL-SCNC: 94 MMOL/L — LOW (ref 96–108)
CHLORIDE SERPL-SCNC: 96 MMOL/L — SIGNIFICANT CHANGE UP (ref 96–108)
CHLORIDE SERPL-SCNC: 98 MMOL/L — SIGNIFICANT CHANGE UP (ref 96–108)
CHOLEST SERPL-MCNC: 243 MG/DL — HIGH
CK MB BLD-MCNC: <0.9 % — SIGNIFICANT CHANGE UP (ref 0–3.5)
CK MB BLD-MCNC: <1.5 % — SIGNIFICANT CHANGE UP (ref 0–3.5)
CK MB CFR SERPL CALC: <1 NG/ML — SIGNIFICANT CHANGE UP (ref 0–6.7)
CK SERPL-CCNC: 107 U/L — SIGNIFICANT CHANGE UP (ref 30–200)
CK SERPL-CCNC: 67 U/L — SIGNIFICANT CHANGE UP (ref 30–200)
CK SERPL-CCNC: 81 U/L — SIGNIFICANT CHANGE UP (ref 30–200)
CO2 BLDV-SCNC: 25 MMOL/L — SIGNIFICANT CHANGE UP (ref 22–26)
CO2 SERPL-SCNC: 20 MMOL/L — LOW (ref 22–31)
CO2 SERPL-SCNC: 21 MMOL/L — LOW (ref 22–31)
CO2 SERPL-SCNC: 22 MMOL/L — SIGNIFICANT CHANGE UP (ref 22–31)
CO2 SERPL-SCNC: 23 MMOL/L — SIGNIFICANT CHANGE UP (ref 22–31)
CO2 SERPL-SCNC: 26 MMOL/L — SIGNIFICANT CHANGE UP (ref 22–31)
CO2 SERPL-SCNC: 29 MMOL/L — SIGNIFICANT CHANGE UP (ref 22–31)
CO2 SERPL-SCNC: 31 MMOL/L — SIGNIFICANT CHANGE UP (ref 22–31)
COCAINE METAB.OTHER UR-MCNC: NEGATIVE — SIGNIFICANT CHANGE UP
COLOR SPEC: ABNORMAL
COLOR SPEC: SIGNIFICANT CHANGE UP
COLOR SPEC: YELLOW — SIGNIFICANT CHANGE UP
CREAT SERPL-MCNC: 0.49 MG/DL — LOW (ref 0.5–1.3)
CREAT SERPL-MCNC: 0.53 MG/DL — SIGNIFICANT CHANGE UP (ref 0.5–1.3)
CREAT SERPL-MCNC: 0.55 MG/DL — SIGNIFICANT CHANGE UP (ref 0.5–1.3)
CREAT SERPL-MCNC: 0.57 MG/DL — SIGNIFICANT CHANGE UP (ref 0.5–1.3)
CREAT SERPL-MCNC: 0.61 MG/DL — SIGNIFICANT CHANGE UP (ref 0.5–1.3)
CREAT SERPL-MCNC: 0.64 MG/DL — SIGNIFICANT CHANGE UP (ref 0.5–1.3)
CREAT SERPL-MCNC: 0.68 MG/DL — SIGNIFICANT CHANGE UP (ref 0.5–1.3)
CREAT SERPL-MCNC: 0.68 MG/DL — SIGNIFICANT CHANGE UP (ref 0.5–1.3)
CREAT SERPL-MCNC: 0.76 MG/DL — SIGNIFICANT CHANGE UP (ref 0.5–1.3)
CREAT SERPL-MCNC: 0.78 MG/DL — SIGNIFICANT CHANGE UP (ref 0.5–1.3)
CREAT SERPL-MCNC: 0.84 MG/DL — SIGNIFICANT CHANGE UP (ref 0.5–1.3)
CULTURE RESULTS: SIGNIFICANT CHANGE UP
D DIMER BLD IA.RAPID-MCNC: 1668 NG/ML DDU — HIGH
D DIMER BLD IA.RAPID-MCNC: 751 NG/ML DDU — HIGH
DIFF PNL FLD: ABNORMAL
EGFR: 110 ML/MIN/1.73M2 — SIGNIFICANT CHANGE UP
EGFR: 112 ML/MIN/1.73M2 — SIGNIFICANT CHANGE UP
EGFR: 113 ML/MIN/1.73M2 — SIGNIFICANT CHANGE UP
EGFR: 117 ML/MIN/1.73M2 — SIGNIFICANT CHANGE UP
EGFR: 117 ML/MIN/1.73M2 — SIGNIFICANT CHANGE UP
EGFR: 119 ML/MIN/1.73M2 — SIGNIFICANT CHANGE UP
EGFR: 121 ML/MIN/1.73M2 — SIGNIFICANT CHANGE UP
EGFR: 123 ML/MIN/1.73M2 — SIGNIFICANT CHANGE UP
EGFR: 125 ML/MIN/1.73M2 — SIGNIFICANT CHANGE UP
EGFR: 126 ML/MIN/1.73M2 — SIGNIFICANT CHANGE UP
EGFR: 129 ML/MIN/1.73M2 — SIGNIFICANT CHANGE UP
EOSINOPHIL # BLD AUTO: 0 K/UL — SIGNIFICANT CHANGE UP (ref 0–0.5)
EOSINOPHIL # BLD AUTO: 0.01 K/UL — SIGNIFICANT CHANGE UP (ref 0–0.5)
EOSINOPHIL # BLD AUTO: 0.03 K/UL — SIGNIFICANT CHANGE UP (ref 0–0.5)
EOSINOPHIL # BLD AUTO: 0.05 K/UL — SIGNIFICANT CHANGE UP (ref 0–0.5)
EOSINOPHIL NFR BLD AUTO: 0 % — SIGNIFICANT CHANGE UP (ref 0–6)
EOSINOPHIL NFR BLD AUTO: 0.4 % — SIGNIFICANT CHANGE UP (ref 0–6)
EOSINOPHIL NFR BLD AUTO: 0.9 % — SIGNIFICANT CHANGE UP (ref 0–6)
EOSINOPHIL NFR BLD AUTO: 2 % — SIGNIFICANT CHANGE UP (ref 0–6)
ESTIMATED AVERAGE GLUCOSE: 82 MG/DL — SIGNIFICANT CHANGE UP (ref 68–114)
ETHANOL SERPL-MCNC: 302 MG/DL — HIGH (ref 0–10)
FIBRINOGEN PPP-MCNC: 187 MG/DL — LOW (ref 200–445)
FIBRINOGEN PPP-MCNC: 297 MG/DL — SIGNIFICANT CHANGE UP (ref 200–445)
FIBRINOGEN PPP-MCNC: 391 MG/DL — SIGNIFICANT CHANGE UP (ref 200–445)
FIBRINOGEN PPP-MCNC: 398 MG/DL — SIGNIFICANT CHANGE UP (ref 200–445)
FIBRINOGEN PPP-MCNC: 416 MG/DL — SIGNIFICANT CHANGE UP (ref 200–445)
GAS PNL BLDA: SIGNIFICANT CHANGE UP
GAS PNL BLDV: 126 MMOL/L — LOW (ref 136–145)
GAS PNL BLDV: SIGNIFICANT CHANGE UP
GAS PNL BLDV: SIGNIFICANT CHANGE UP
GLUCOSE BLDC GLUCOMTR-MCNC: 120 MG/DL — HIGH (ref 70–99)
GLUCOSE BLDC GLUCOMTR-MCNC: 141 MG/DL — HIGH (ref 70–99)
GLUCOSE BLDC GLUCOMTR-MCNC: 174 MG/DL — HIGH (ref 70–99)
GLUCOSE BLDC GLUCOMTR-MCNC: 74 MG/DL — SIGNIFICANT CHANGE UP (ref 70–99)
GLUCOSE BLDV-MCNC: 107 MG/DL — HIGH (ref 70–99)
GLUCOSE SERPL-MCNC: 106 MG/DL — HIGH (ref 70–99)
GLUCOSE SERPL-MCNC: 135 MG/DL — HIGH (ref 70–99)
GLUCOSE SERPL-MCNC: 135 MG/DL — HIGH (ref 70–99)
GLUCOSE SERPL-MCNC: 146 MG/DL — HIGH (ref 70–99)
GLUCOSE SERPL-MCNC: 154 MG/DL — HIGH (ref 70–99)
GLUCOSE SERPL-MCNC: 162 MG/DL — HIGH (ref 70–99)
GLUCOSE SERPL-MCNC: 183 MG/DL — HIGH (ref 70–99)
GLUCOSE SERPL-MCNC: 209 MG/DL — HIGH (ref 70–99)
GLUCOSE SERPL-MCNC: 70 MG/DL — SIGNIFICANT CHANGE UP (ref 70–99)
GLUCOSE SERPL-MCNC: 73 MG/DL — SIGNIFICANT CHANGE UP (ref 70–99)
GLUCOSE SERPL-MCNC: 92 MG/DL — SIGNIFICANT CHANGE UP (ref 70–99)
GLUCOSE UR QL: NEGATIVE MG/DL — SIGNIFICANT CHANGE UP
HAV IGM SER-ACNC: SIGNIFICANT CHANGE UP
HBV CORE IGM SER-ACNC: SIGNIFICANT CHANGE UP
HBV SURFACE AG SER-ACNC: SIGNIFICANT CHANGE UP
HCO3 BLDV-SCNC: 24 MMOL/L — SIGNIFICANT CHANGE UP (ref 22–29)
HCT VFR BLD CALC: 24 % — LOW (ref 39–50)
HCT VFR BLD CALC: 25.3 % — LOW (ref 39–50)
HCT VFR BLD CALC: 28.1 % — LOW (ref 39–50)
HCT VFR BLD CALC: 28.4 % — LOW (ref 39–50)
HCT VFR BLD CALC: 28.8 % — LOW (ref 39–50)
HCT VFR BLD CALC: 28.9 % — LOW (ref 39–50)
HCT VFR BLD CALC: 29.1 % — LOW (ref 39–50)
HCT VFR BLD CALC: 34.6 % — LOW (ref 39–50)
HCT VFR BLDA CALC: 38 % — LOW (ref 39–51)
HCV AB S/CO SERPL IA: 0.1 S/CO — SIGNIFICANT CHANGE UP (ref 0–0.99)
HCV AB SERPL-IMP: SIGNIFICANT CHANGE UP
HDLC SERPL-MCNC: 104 MG/DL — SIGNIFICANT CHANGE UP
HGB BLD CALC-MCNC: 12.6 G/DL — SIGNIFICANT CHANGE UP (ref 12.6–17.4)
HGB BLD-MCNC: 10 G/DL — LOW (ref 13–17)
HGB BLD-MCNC: 10 G/DL — LOW (ref 13–17)
HGB BLD-MCNC: 10.3 G/DL — LOW (ref 13–17)
HGB BLD-MCNC: 12.4 G/DL — LOW (ref 13–17)
HGB BLD-MCNC: 8.3 G/DL — LOW (ref 13–17)
HGB BLD-MCNC: 8.4 G/DL — LOW (ref 13–17)
HGB BLD-MCNC: 9.5 G/DL — LOW (ref 13–17)
HGB BLD-MCNC: 9.6 G/DL — LOW (ref 13–17)
HGB BLDA-MCNC: 9.7 G/DL — LOW (ref 12.6–17.4)
HGB FLD-MCNC: 9.5 G/DL — LOW (ref 12.6–17.4)
IMM GRANULOCYTES NFR BLD AUTO: 0.4 % — SIGNIFICANT CHANGE UP (ref 0–0.9)
IMM GRANULOCYTES NFR BLD AUTO: 0.5 % — SIGNIFICANT CHANGE UP (ref 0–0.9)
IMM GRANULOCYTES NFR BLD AUTO: 0.6 % — SIGNIFICANT CHANGE UP (ref 0–0.9)
IMM GRANULOCYTES NFR BLD AUTO: 0.8 % — SIGNIFICANT CHANGE UP (ref 0–0.9)
IMM GRANULOCYTES NFR BLD AUTO: 1 % — HIGH (ref 0–0.9)
INR BLD: 0.83 RATIO — LOW (ref 0.85–1.18)
INR BLD: 0.85 RATIO — SIGNIFICANT CHANGE UP (ref 0.85–1.18)
INR BLD: 0.86 RATIO — SIGNIFICANT CHANGE UP (ref 0.85–1.18)
INR BLD: 0.89 RATIO — SIGNIFICANT CHANGE UP (ref 0.85–1.18)
INR BLD: 0.9 RATIO — SIGNIFICANT CHANGE UP (ref 0.85–1.18)
INR BLD: 0.91 RATIO — SIGNIFICANT CHANGE UP (ref 0.85–1.18)
INR BLD: 0.92 RATIO — SIGNIFICANT CHANGE UP (ref 0.85–1.18)
KETONES UR-MCNC: 15 MG/DL
KETONES UR-MCNC: 15 MG/DL
KETONES UR-MCNC: 80 MG/DL
LACTATE BLDV-MCNC: 3.6 MMOL/L — HIGH (ref 0.5–2)
LACTATE SERPL-SCNC: 0.8 MMOL/L — SIGNIFICANT CHANGE UP (ref 0.5–2)
LEUKOCYTE ESTERASE UR-ACNC: ABNORMAL
LEUKOCYTE ESTERASE UR-ACNC: NEGATIVE — SIGNIFICANT CHANGE UP
LEUKOCYTE ESTERASE UR-ACNC: NEGATIVE — SIGNIFICANT CHANGE UP
LIDOCAIN IGE QN: 169 U/L — HIGH (ref 7–60)
LIDOCAIN IGE QN: 203 U/L — HIGH (ref 7–60)
LIDOCAIN IGE QN: 229 U/L — HIGH (ref 7–60)
LIPID PNL WITH DIRECT LDL SERPL: 127 MG/DL — HIGH
LYMPHOCYTES # BLD AUTO: 0.15 K/UL — LOW (ref 1–3.3)
LYMPHOCYTES # BLD AUTO: 0.29 K/UL — LOW (ref 1–3.3)
LYMPHOCYTES # BLD AUTO: 0.32 K/UL — LOW (ref 1–3.3)
LYMPHOCYTES # BLD AUTO: 0.34 K/UL — LOW (ref 1–3.3)
LYMPHOCYTES # BLD AUTO: 0.53 K/UL — LOW (ref 1–3.3)
LYMPHOCYTES # BLD AUTO: 0.6 K/UL — LOW (ref 1–3.3)
LYMPHOCYTES # BLD AUTO: 0.72 K/UL — LOW (ref 1–3.3)
LYMPHOCYTES # BLD AUTO: 10.4 % — LOW (ref 13–44)
LYMPHOCYTES # BLD AUTO: 16.2 % — SIGNIFICANT CHANGE UP (ref 13–44)
LYMPHOCYTES # BLD AUTO: 22.9 % — SIGNIFICANT CHANGE UP (ref 13–44)
LYMPHOCYTES # BLD AUTO: 30 % — SIGNIFICANT CHANGE UP (ref 13–44)
LYMPHOCYTES # BLD AUTO: 5.4 % — LOW (ref 13–44)
LYMPHOCYTES # BLD AUTO: 8 % — LOW (ref 13–44)
LYMPHOCYTES # BLD AUTO: 8.1 % — LOW (ref 13–44)
MAGNESIUM SERPL-MCNC: 1.6 MG/DL — SIGNIFICANT CHANGE UP (ref 1.6–2.6)
MAGNESIUM SERPL-MCNC: 1.6 MG/DL — SIGNIFICANT CHANGE UP (ref 1.6–2.6)
MAGNESIUM SERPL-MCNC: 1.7 MG/DL — SIGNIFICANT CHANGE UP (ref 1.6–2.6)
MAGNESIUM SERPL-MCNC: 1.8 MG/DL — SIGNIFICANT CHANGE UP (ref 1.6–2.6)
MAGNESIUM SERPL-MCNC: 1.8 MG/DL — SIGNIFICANT CHANGE UP (ref 1.6–2.6)
MAGNESIUM SERPL-MCNC: 2 MG/DL — SIGNIFICANT CHANGE UP (ref 1.6–2.6)
MAGNESIUM SERPL-MCNC: 2.1 MG/DL — SIGNIFICANT CHANGE UP (ref 1.6–2.6)
MAGNESIUM SERPL-MCNC: 2.3 MG/DL — SIGNIFICANT CHANGE UP (ref 1.6–2.6)
MANUAL SMEAR VERIFICATION: SIGNIFICANT CHANGE UP
MANUAL SMEAR VERIFICATION: SIGNIFICANT CHANGE UP
MCHC RBC-ENTMCNC: 33.2 GM/DL — SIGNIFICANT CHANGE UP (ref 32–36)
MCHC RBC-ENTMCNC: 33.8 GM/DL — SIGNIFICANT CHANGE UP (ref 32–36)
MCHC RBC-ENTMCNC: 33.8 GM/DL — SIGNIFICANT CHANGE UP (ref 32–36)
MCHC RBC-ENTMCNC: 34.3 PG — HIGH (ref 27–34)
MCHC RBC-ENTMCNC: 34.4 PG — HIGH (ref 27–34)
MCHC RBC-ENTMCNC: 34.5 PG — HIGH (ref 27–34)
MCHC RBC-ENTMCNC: 34.5 PG — HIGH (ref 27–34)
MCHC RBC-ENTMCNC: 34.6 GM/DL — SIGNIFICANT CHANGE UP (ref 32–36)
MCHC RBC-ENTMCNC: 34.6 GM/DL — SIGNIFICANT CHANGE UP (ref 32–36)
MCHC RBC-ENTMCNC: 34.6 PG — HIGH (ref 27–34)
MCHC RBC-ENTMCNC: 34.6 PG — HIGH (ref 27–34)
MCHC RBC-ENTMCNC: 34.7 GM/DL — SIGNIFICANT CHANGE UP (ref 32–36)
MCHC RBC-ENTMCNC: 34.7 PG — HIGH (ref 27–34)
MCHC RBC-ENTMCNC: 35.2 PG — HIGH (ref 27–34)
MCHC RBC-ENTMCNC: 35.4 GM/DL — SIGNIFICANT CHANGE UP (ref 32–36)
MCHC RBC-ENTMCNC: 35.8 GM/DL — SIGNIFICANT CHANGE UP (ref 32–36)
MCV RBC AUTO: 100.3 FL — HIGH (ref 80–100)
MCV RBC AUTO: 101.4 FL — HIGH (ref 80–100)
MCV RBC AUTO: 101.7 FL — HIGH (ref 80–100)
MCV RBC AUTO: 102.2 FL — HIGH (ref 80–100)
MCV RBC AUTO: 104.1 FL — HIGH (ref 80–100)
MCV RBC AUTO: 96.4 FL — SIGNIFICANT CHANGE UP (ref 80–100)
MCV RBC AUTO: 97.3 FL — SIGNIFICANT CHANGE UP (ref 80–100)
MCV RBC AUTO: 99.7 FL — SIGNIFICANT CHANGE UP (ref 80–100)
METAMYELOCYTES # FLD: 1 % — HIGH (ref 0–0)
METHADONE UR-MCNC: NEGATIVE — SIGNIFICANT CHANGE UP
MONOCYTES # BLD AUTO: 0.12 K/UL — SIGNIFICANT CHANGE UP (ref 0–0.9)
MONOCYTES # BLD AUTO: 0.12 K/UL — SIGNIFICANT CHANGE UP (ref 0–0.9)
MONOCYTES # BLD AUTO: 0.3 K/UL — SIGNIFICANT CHANGE UP (ref 0–0.9)
MONOCYTES # BLD AUTO: 0.32 K/UL — SIGNIFICANT CHANGE UP (ref 0–0.9)
MONOCYTES # BLD AUTO: 0.32 K/UL — SIGNIFICANT CHANGE UP (ref 0–0.9)
MONOCYTES # BLD AUTO: 0.34 K/UL — SIGNIFICANT CHANGE UP (ref 0–0.9)
MONOCYTES # BLD AUTO: 0.39 K/UL — SIGNIFICANT CHANGE UP (ref 0–0.9)
MONOCYTES NFR BLD AUTO: 11 % — SIGNIFICANT CHANGE UP (ref 2–14)
MONOCYTES NFR BLD AUTO: 11.5 % — SIGNIFICANT CHANGE UP (ref 2–14)
MONOCYTES NFR BLD AUTO: 4.3 % — SIGNIFICANT CHANGE UP (ref 2–14)
MONOCYTES NFR BLD AUTO: 5 % — SIGNIFICANT CHANGE UP (ref 2–14)
MONOCYTES NFR BLD AUTO: 9 % — SIGNIFICANT CHANGE UP (ref 2–14)
MONOCYTES NFR BLD AUTO: 9.2 % — SIGNIFICANT CHANGE UP (ref 2–14)
MONOCYTES NFR BLD AUTO: 9.9 % — SIGNIFICANT CHANGE UP (ref 2–14)
MYOGLOBIN SERPL-MCNC: 41 NG/ML — SIGNIFICANT CHANGE UP (ref 28–72)
MYOGLOBIN SERPL-MCNC: <21 NG/ML — LOW (ref 28–72)
NEUTROPHILS # BLD AUTO: 1.49 K/UL — LOW (ref 1.8–7.4)
NEUTROPHILS # BLD AUTO: 1.68 K/UL — LOW (ref 1.8–7.4)
NEUTROPHILS # BLD AUTO: 2.34 K/UL — SIGNIFICANT CHANGE UP (ref 1.8–7.4)
NEUTROPHILS # BLD AUTO: 2.38 K/UL — SIGNIFICANT CHANGE UP (ref 1.8–7.4)
NEUTROPHILS # BLD AUTO: 2.47 K/UL — SIGNIFICANT CHANGE UP (ref 1.8–7.4)
NEUTROPHILS # BLD AUTO: 2.92 K/UL — SIGNIFICANT CHANGE UP (ref 1.8–7.4)
NEUTROPHILS # BLD AUTO: 3.49 K/UL — SIGNIFICANT CHANGE UP (ref 1.8–7.4)
NEUTROPHILS NFR BLD AUTO: 54 % — SIGNIFICANT CHANGE UP (ref 43–77)
NEUTROPHILS NFR BLD AUTO: 64 % — SIGNIFICANT CHANGE UP (ref 43–77)
NEUTROPHILS NFR BLD AUTO: 72.1 % — SIGNIFICANT CHANGE UP (ref 43–77)
NEUTROPHILS NFR BLD AUTO: 77 % — SIGNIFICANT CHANGE UP (ref 43–77)
NEUTROPHILS NFR BLD AUTO: 81.8 % — HIGH (ref 43–77)
NEUTROPHILS NFR BLD AUTO: 82 % — HIGH (ref 43–77)
NEUTROPHILS NFR BLD AUTO: 89.5 % — HIGH (ref 43–77)
NEUTS BAND # BLD: 8 % — SIGNIFICANT CHANGE UP (ref 0–8)
NITRITE UR-MCNC: NEGATIVE — SIGNIFICANT CHANGE UP
NON HDL CHOLESTEROL: 139 MG/DL — HIGH
NRBC # BLD: 0 /100 WBCS — SIGNIFICANT CHANGE UP (ref 0–0)
OPIATES UR-MCNC: NEGATIVE — SIGNIFICANT CHANGE UP
OSMOLALITY SERPL: 325 MOSMOL/KG — HIGH (ref 275–300)
OSMOLALITY SERPL: 327 MOSMOL/KG — HIGH (ref 275–300)
OSMOLALITY UR: 343 MOS/KG — SIGNIFICANT CHANGE UP (ref 300–900)
OXYCODONE UR-MCNC: NEGATIVE — SIGNIFICANT CHANGE UP
OXYHGB MFR BLDA: 96.7 % — HIGH (ref 90–95)
OXYHGB MFR BLDMV: 65.6 % — LOW (ref 90–95)
PCO2 BLDV: 31 MMHG — LOW (ref 42–55)
PCP SPEC-MCNC: SIGNIFICANT CHANGE UP
PCP UR-MCNC: NEGATIVE — SIGNIFICANT CHANGE UP
PH BLDV: 7.5 — HIGH (ref 7.32–7.43)
PH UR: 6 — SIGNIFICANT CHANGE UP (ref 5–8)
PH UR: 6 — SIGNIFICANT CHANGE UP (ref 5–8)
PH UR: >=9 (ref 5–8)
PHOSPHATE SERPL-MCNC: 2.1 MG/DL — LOW (ref 2.5–4.5)
PHOSPHATE SERPL-MCNC: 2.4 MG/DL — LOW (ref 2.5–4.5)
PHOSPHATE SERPL-MCNC: 2.6 MG/DL — SIGNIFICANT CHANGE UP (ref 2.5–4.5)
PHOSPHATE SERPL-MCNC: 2.9 MG/DL — SIGNIFICANT CHANGE UP (ref 2.5–4.5)
PHOSPHATE SERPL-MCNC: 2.9 MG/DL — SIGNIFICANT CHANGE UP (ref 2.5–4.5)
PHOSPHATE SERPL-MCNC: 3 MG/DL — SIGNIFICANT CHANGE UP (ref 2.5–4.5)
PHOSPHATE SERPL-MCNC: 3.5 MG/DL — SIGNIFICANT CHANGE UP (ref 2.5–4.5)
PHOSPHATE SERPL-MCNC: 3.8 MG/DL — SIGNIFICANT CHANGE UP (ref 2.5–4.5)
PLAT MORPH BLD: NORMAL — SIGNIFICANT CHANGE UP
PLAT MORPH BLD: NORMAL — SIGNIFICANT CHANGE UP
PLATELET # BLD AUTO: 50 K/UL — LOW (ref 150–400)
PLATELET # BLD AUTO: 55 K/UL — LOW (ref 150–400)
PLATELET # BLD AUTO: 66 K/UL — LOW (ref 150–400)
PLATELET # BLD AUTO: 67 K/UL — LOW (ref 150–400)
PLATELET # BLD AUTO: 73 K/UL — LOW (ref 150–400)
PLATELET # BLD AUTO: 87 K/UL — LOW (ref 150–400)
PLATELET # BLD AUTO: 89 K/UL — LOW (ref 150–400)
PLATELET # BLD AUTO: 94 K/UL — LOW (ref 150–400)
PLATELET MAPPING ACTF MAX AMPLITUDE: 6.6 MM — SIGNIFICANT CHANGE UP (ref 2–19)
PLATELET MAPPING ACTF MAX AMPLITUDE: 6.7 MM — SIGNIFICANT CHANGE UP (ref 2–19)
PLATELET MAPPING ADP MAXIMUM AMPLITUDE: 14.6 MM — LOW (ref 45–69)
PLATELET MAPPING ADP MAXIMUM AMPLITUDE: 31.4 MM — LOW (ref 45–69)
PLATELET MAPPING ADP PERCENT INHIBITION: 50.7 % — HIGH (ref 0–17)
PLATELET MAPPING ADP PERCENT INHIBITION: 82.9 % — HIGH (ref 0–17)
PLATELET MAPPING ARACHIDONIC ACID INHIBITION: 84.8 % — HIGH (ref 0–11)
PLATELET MAPPING ARACHIDONIC ACID INHIBITION: SIGNIFICANT CHANGE UP % (ref 0–11)
PLATELET MAPPING HKH MAXIMUM AMPLITUDE: 53.4 MM — SIGNIFICANT CHANGE UP (ref 53–68)
PLATELET MAPPING HKH MAXIMUM AMPLITUDE: 56.8 MM — SIGNIFICANT CHANGE UP (ref 53–68)
PO2 BLDV: 88 MMHG — HIGH (ref 25–45)
POTASSIUM BLDV-SCNC: 5 MMOL/L — SIGNIFICANT CHANGE UP (ref 3.5–5.1)
POTASSIUM SERPL-MCNC: 2.4 MMOL/L — CRITICAL LOW (ref 3.5–5.3)
POTASSIUM SERPL-MCNC: 2.6 MMOL/L — CRITICAL LOW (ref 3.5–5.3)
POTASSIUM SERPL-MCNC: 2.6 MMOL/L — CRITICAL LOW (ref 3.5–5.3)
POTASSIUM SERPL-MCNC: 2.8 MMOL/L — CRITICAL LOW (ref 3.5–5.3)
POTASSIUM SERPL-MCNC: 2.8 MMOL/L — CRITICAL LOW (ref 3.5–5.3)
POTASSIUM SERPL-MCNC: 3.1 MMOL/L — LOW (ref 3.5–5.3)
POTASSIUM SERPL-MCNC: 3.2 MMOL/L — LOW (ref 3.5–5.3)
POTASSIUM SERPL-MCNC: 3.3 MMOL/L — LOW (ref 3.5–5.3)
POTASSIUM SERPL-MCNC: 3.5 MMOL/L — SIGNIFICANT CHANGE UP (ref 3.5–5.3)
POTASSIUM SERPL-MCNC: 3.8 MMOL/L — SIGNIFICANT CHANGE UP (ref 3.5–5.3)
POTASSIUM SERPL-MCNC: 4.7 MMOL/L — SIGNIFICANT CHANGE UP (ref 3.5–5.3)
POTASSIUM SERPL-SCNC: 2.4 MMOL/L — CRITICAL LOW (ref 3.5–5.3)
POTASSIUM SERPL-SCNC: 2.6 MMOL/L — CRITICAL LOW (ref 3.5–5.3)
POTASSIUM SERPL-SCNC: 2.6 MMOL/L — CRITICAL LOW (ref 3.5–5.3)
POTASSIUM SERPL-SCNC: 2.8 MMOL/L — CRITICAL LOW (ref 3.5–5.3)
POTASSIUM SERPL-SCNC: 2.8 MMOL/L — CRITICAL LOW (ref 3.5–5.3)
POTASSIUM SERPL-SCNC: 3.1 MMOL/L — LOW (ref 3.5–5.3)
POTASSIUM SERPL-SCNC: 3.2 MMOL/L — LOW (ref 3.5–5.3)
POTASSIUM SERPL-SCNC: 3.3 MMOL/L — LOW (ref 3.5–5.3)
POTASSIUM SERPL-SCNC: 3.5 MMOL/L — SIGNIFICANT CHANGE UP (ref 3.5–5.3)
POTASSIUM SERPL-SCNC: 3.8 MMOL/L — SIGNIFICANT CHANGE UP (ref 3.5–5.3)
POTASSIUM SERPL-SCNC: 4.7 MMOL/L — SIGNIFICANT CHANGE UP (ref 3.5–5.3)
PROT SERPL-MCNC: 6.4 G/DL — SIGNIFICANT CHANGE UP (ref 6–8.3)
PROT SERPL-MCNC: 6.9 G/DL — SIGNIFICANT CHANGE UP (ref 6–8.3)
PROT SERPL-MCNC: 7.1 G/DL — SIGNIFICANT CHANGE UP (ref 6–8.3)
PROT SERPL-MCNC: 7.5 G/DL — SIGNIFICANT CHANGE UP (ref 6–8.3)
PROT SERPL-MCNC: 7.7 G/DL — SIGNIFICANT CHANGE UP (ref 6–8.3)
PROT SERPL-MCNC: 8.2 G/DL — SIGNIFICANT CHANGE UP (ref 6–8.3)
PROT UR-MCNC: 100 MG/DL
PROT UR-MCNC: SIGNIFICANT CHANGE UP MG/DL
PROT UR-MCNC: SIGNIFICANT CHANGE UP MG/DL
PROTHROM AB SERPL-ACNC: 10.1 SEC — SIGNIFICANT CHANGE UP (ref 9.5–13)
PROTHROM AB SERPL-ACNC: 9.1 SEC — LOW (ref 9.5–13)
PROTHROM AB SERPL-ACNC: 9.2 SEC — LOW (ref 9.5–13)
PROTHROM AB SERPL-ACNC: 9.4 SEC — LOW (ref 9.5–13)
PROTHROM AB SERPL-ACNC: 9.6 SEC — SIGNIFICANT CHANGE UP (ref 9.5–13)
PROTHROM AB SERPL-ACNC: 9.8 SEC — SIGNIFICANT CHANGE UP (ref 9.5–13)
PROTHROM AB SERPL-ACNC: 9.9 SEC — SIGNIFICANT CHANGE UP (ref 9.5–13)
RAPIDTEG MAXIMUM AMPLITUDE: 50 MM — LOW (ref 52–70)
RAPIDTEG MAXIMUM AMPLITUDE: 52.1 MM — SIGNIFICANT CHANGE UP (ref 52–70)
RBC # BLD: 2.36 M/UL — LOW (ref 4.2–5.8)
RBC # BLD: 2.43 M/UL — LOW (ref 4.2–5.8)
RBC # BLD: 2.75 M/UL — LOW (ref 4.2–5.8)
RBC # BLD: 2.8 M/UL — LOW (ref 4.2–5.8)
RBC # BLD: 2.88 M/UL — LOW (ref 4.2–5.8)
RBC # BLD: 2.89 M/UL — LOW (ref 4.2–5.8)
RBC # BLD: 2.99 M/UL — LOW (ref 4.2–5.8)
RBC # BLD: 3.59 M/UL — LOW (ref 4.2–5.8)
RBC # FLD: 14 % — SIGNIFICANT CHANGE UP (ref 10.3–14.5)
RBC # FLD: 14 % — SIGNIFICANT CHANGE UP (ref 10.3–14.5)
RBC # FLD: 14.2 % — SIGNIFICANT CHANGE UP (ref 10.3–14.5)
RBC # FLD: 14.4 % — SIGNIFICANT CHANGE UP (ref 10.3–14.5)
RBC # FLD: 14.5 % — SIGNIFICANT CHANGE UP (ref 10.3–14.5)
RBC # FLD: 14.6 % — HIGH (ref 10.3–14.5)
RBC # FLD: 14.6 % — HIGH (ref 10.3–14.5)
RBC # FLD: 14.9 % — HIGH (ref 10.3–14.5)
RBC BLD AUTO: SIGNIFICANT CHANGE UP
RBC BLD AUTO: SIGNIFICANT CHANGE UP
RBC CASTS # UR COMP ASSIST: 23 /HPF — HIGH (ref 0–4)
RBC CASTS # UR COMP ASSIST: 3 /HPF — SIGNIFICANT CHANGE UP (ref 0–4)
RBC CASTS # UR COMP ASSIST: 4 /HPF — SIGNIFICANT CHANGE UP (ref 0–4)
REVIEW: SIGNIFICANT CHANGE UP
REVIEW: SIGNIFICANT CHANGE UP
RH IG SCN BLD-IMP: POSITIVE — SIGNIFICANT CHANGE UP
RH IG SCN BLD-IMP: POSITIVE — SIGNIFICANT CHANGE UP
SALICYLATES SERPL-MCNC: <2 MG/DL — LOW (ref 15–30)
SALICYLATES SERPL-MCNC: <2 MG/DL — LOW (ref 15–30)
SAO2 % BLD: 67.9 % — SIGNIFICANT CHANGE UP (ref 60–90)
SAO2 % BLDA: 100 % — HIGH (ref 94–98)
SAO2 % BLDV: 97.7 % — HIGH (ref 67–88)
SODIUM SERPL-SCNC: 130 MMOL/L — LOW (ref 135–145)
SODIUM SERPL-SCNC: 132 MMOL/L — LOW (ref 135–145)
SODIUM SERPL-SCNC: 135 MMOL/L — SIGNIFICANT CHANGE UP (ref 135–145)
SODIUM SERPL-SCNC: 138 MMOL/L — SIGNIFICANT CHANGE UP (ref 135–145)
SODIUM SERPL-SCNC: 138 MMOL/L — SIGNIFICANT CHANGE UP (ref 135–145)
SODIUM SERPL-SCNC: 140 MMOL/L — SIGNIFICANT CHANGE UP (ref 135–145)
SODIUM SERPL-SCNC: 140 MMOL/L — SIGNIFICANT CHANGE UP (ref 135–145)
SODIUM SERPL-SCNC: 141 MMOL/L — SIGNIFICANT CHANGE UP (ref 135–145)
SODIUM SERPL-SCNC: 142 MMOL/L — SIGNIFICANT CHANGE UP (ref 135–145)
SODIUM SERPL-SCNC: 143 MMOL/L — SIGNIFICANT CHANGE UP (ref 135–145)
SODIUM SERPL-SCNC: 143 MMOL/L — SIGNIFICANT CHANGE UP (ref 135–145)
SODIUM UR-SCNC: 83 MMOL/L — SIGNIFICANT CHANGE UP
SP GR SPEC: 1.01 — SIGNIFICANT CHANGE UP (ref 1–1.03)
SP GR SPEC: 1.02 — SIGNIFICANT CHANGE UP (ref 1–1.03)
SP GR SPEC: 1.02 — SIGNIFICANT CHANGE UP (ref 1–1.03)
SPECIMEN SOURCE: SIGNIFICANT CHANGE UP
SQUAMOUS # UR AUTO: 0 /HPF — SIGNIFICANT CHANGE UP (ref 0–5)
SQUAMOUS # UR AUTO: 2 /HPF — SIGNIFICANT CHANGE UP (ref 0–5)
SQUAMOUS # UR AUTO: 2 /HPF — SIGNIFICANT CHANGE UP (ref 0–5)
TEG FUNCTIONAL FIBRINOGEN: 16.5 MM — SIGNIFICANT CHANGE UP (ref 15–32)
TEG FUNCTIONAL FIBRINOGEN: 16.9 MM — SIGNIFICANT CHANGE UP (ref 15–32)
TEG LY30 (LYSIS): 0 % — SIGNIFICANT CHANGE UP (ref 0–2.6)
TEG LY30 (LYSIS): 0 % — SIGNIFICANT CHANGE UP (ref 0–2.6)
TEG REACTION TIME: 4.6 MIN — SIGNIFICANT CHANGE UP (ref 4.6–9.1)
TEG REACTION TIME: 4.6 MIN — SIGNIFICANT CHANGE UP (ref 4.6–9.1)
THC UR QL: NEGATIVE — SIGNIFICANT CHANGE UP
TRIGL SERPL-MCNC: 71 MG/DL — SIGNIFICANT CHANGE UP
TROPONIN T, HIGH SENSITIVITY RESULT: 12 NG/L — SIGNIFICANT CHANGE UP (ref 0–51)
TROPONIN T, HIGH SENSITIVITY RESULT: 8 NG/L — SIGNIFICANT CHANGE UP (ref 0–51)
TROPONIN T, HIGH SENSITIVITY RESULT: 9 NG/L — SIGNIFICANT CHANGE UP (ref 0–51)
TSH SERPL-MCNC: 1.67 UIU/ML — SIGNIFICANT CHANGE UP (ref 0.27–4.2)
UROBILINOGEN FLD QL: 1 MG/DL — SIGNIFICANT CHANGE UP (ref 0.2–1)
WBC # BLD: 2.4 K/UL — LOW (ref 3.8–10.5)
WBC # BLD: 2.62 K/UL — LOW (ref 3.8–10.5)
WBC # BLD: 2.76 K/UL — LOW (ref 3.8–10.5)
WBC # BLD: 3 K/UL — LOW (ref 3.8–10.5)
WBC # BLD: 3.09 K/UL — LOW (ref 3.8–10.5)
WBC # BLD: 3.25 K/UL — LOW (ref 3.8–10.5)
WBC # BLD: 3.56 K/UL — LOW (ref 3.8–10.5)
WBC # BLD: 4.26 K/UL — SIGNIFICANT CHANGE UP (ref 3.8–10.5)
WBC # FLD AUTO: 2.4 K/UL — LOW (ref 3.8–10.5)
WBC # FLD AUTO: 2.62 K/UL — LOW (ref 3.8–10.5)
WBC # FLD AUTO: 2.76 K/UL — LOW (ref 3.8–10.5)
WBC # FLD AUTO: 3 K/UL — LOW (ref 3.8–10.5)
WBC # FLD AUTO: 3.09 K/UL — LOW (ref 3.8–10.5)
WBC # FLD AUTO: 3.25 K/UL — LOW (ref 3.8–10.5)
WBC # FLD AUTO: 3.56 K/UL — LOW (ref 3.8–10.5)
WBC # FLD AUTO: 4.26 K/UL — SIGNIFICANT CHANGE UP (ref 3.8–10.5)
WBC UR QL: 0 /HPF — SIGNIFICANT CHANGE UP (ref 0–5)
WBC UR QL: 0 /HPF — SIGNIFICANT CHANGE UP (ref 0–5)
WBC UR QL: 5 /HPF — SIGNIFICANT CHANGE UP (ref 0–5)

## 2024-01-01 PROCEDURE — 86901 BLOOD TYPING SEROLOGIC RH(D): CPT

## 2024-01-01 PROCEDURE — 70498 CT ANGIOGRAPHY NECK: CPT | Mod: 26,MC

## 2024-01-01 PROCEDURE — 71101 X-RAY EXAM UNILAT RIBS/CHEST: CPT | Mod: 26,LT

## 2024-01-01 PROCEDURE — 80061 LIPID PANEL: CPT

## 2024-01-01 PROCEDURE — 93306 TTE W/DOPPLER COMPLETE: CPT | Mod: 26

## 2024-01-01 PROCEDURE — C1769: CPT

## 2024-01-01 PROCEDURE — 80074 ACUTE HEPATITIS PANEL: CPT

## 2024-01-01 PROCEDURE — 86965 POOLING BLOOD PLATELETS: CPT

## 2024-01-01 PROCEDURE — 83874 ASSAY OF MYOGLOBIN: CPT

## 2024-01-01 PROCEDURE — C8929: CPT

## 2024-01-01 PROCEDURE — 99223 1ST HOSP IP/OBS HIGH 75: CPT

## 2024-01-01 PROCEDURE — 71260 CT THORAX DX C+: CPT | Mod: 26,MC

## 2024-01-01 PROCEDURE — 94003 VENT MGMT INPAT SUBQ DAY: CPT

## 2024-01-01 PROCEDURE — 70450 CT HEAD/BRAIN W/O DYE: CPT | Mod: 26,77

## 2024-01-01 PROCEDURE — 93460 R&L HRT ART/VENTRICLE ANGIO: CPT | Mod: 26

## 2024-01-01 PROCEDURE — 85385 FIBRINOGEN ANTIGEN: CPT

## 2024-01-01 PROCEDURE — 87086 URINE CULTURE/COLONY COUNT: CPT

## 2024-01-01 PROCEDURE — 99291 CRITICAL CARE FIRST HOUR: CPT

## 2024-01-01 PROCEDURE — 84484 ASSAY OF TROPONIN QUANT: CPT

## 2024-01-01 PROCEDURE — 82553 CREATINE MB FRACTION: CPT

## 2024-01-01 PROCEDURE — 99285 EMERGENCY DEPT VISIT HI MDM: CPT | Mod: 25

## 2024-01-01 PROCEDURE — 85025 COMPLETE CBC W/AUTO DIFF WBC: CPT

## 2024-01-01 PROCEDURE — 70496 CT ANGIOGRAPHY HEAD: CPT | Mod: MC

## 2024-01-01 PROCEDURE — 85396 CLOTTING ASSAY WHOLE BLOOD: CPT

## 2024-01-01 PROCEDURE — 83935 ASSAY OF URINE OSMOLALITY: CPT

## 2024-01-01 PROCEDURE — 82248 BILIRUBIN DIRECT: CPT

## 2024-01-01 PROCEDURE — C1889: CPT

## 2024-01-01 PROCEDURE — P9012: CPT

## 2024-01-01 PROCEDURE — 83930 ASSAY OF BLOOD OSMOLALITY: CPT

## 2024-01-01 PROCEDURE — 93306 TTE W/DOPPLER COMPLETE: CPT

## 2024-01-01 PROCEDURE — 93970 EXTREMITY STUDY: CPT | Mod: 26

## 2024-01-01 PROCEDURE — 80076 HEPATIC FUNCTION PANEL: CPT

## 2024-01-01 PROCEDURE — 93005 ELECTROCARDIOGRAM TRACING: CPT

## 2024-01-01 PROCEDURE — 93460 R&L HRT ART/VENTRICLE ANGIO: CPT

## 2024-01-01 PROCEDURE — 71045 X-RAY EXAM CHEST 1 VIEW: CPT

## 2024-01-01 PROCEDURE — 83605 ASSAY OF LACTIC ACID: CPT

## 2024-01-01 PROCEDURE — 70450 CT HEAD/BRAIN W/O DYE: CPT | Mod: 26,MA

## 2024-01-01 PROCEDURE — 36430 TRANSFUSION BLD/BLD COMPNT: CPT

## 2024-01-01 PROCEDURE — P9016: CPT

## 2024-01-01 PROCEDURE — 80307 DRUG TEST PRSMV CHEM ANLYZR: CPT

## 2024-01-01 PROCEDURE — 81001 URINALYSIS AUTO W/SCOPE: CPT

## 2024-01-01 PROCEDURE — 74177 CT ABD & PELVIS W/CONTRAST: CPT | Mod: 26,MC

## 2024-01-01 PROCEDURE — 96375 TX/PRO/DX INJ NEW DRUG ADDON: CPT

## 2024-01-01 PROCEDURE — C1887: CPT

## 2024-01-01 PROCEDURE — 31624 DX BRONCHOSCOPE/LAVAGE: CPT

## 2024-01-01 PROCEDURE — 85384 FIBRINOGEN ACTIVITY: CPT

## 2024-01-01 PROCEDURE — 82565 ASSAY OF CREATININE: CPT

## 2024-01-01 PROCEDURE — 82150 ASSAY OF AMYLASE: CPT

## 2024-01-01 PROCEDURE — 82803 BLOOD GASES ANY COMBINATION: CPT

## 2024-01-01 PROCEDURE — 71045 X-RAY EXAM CHEST 1 VIEW: CPT | Mod: 26,77

## 2024-01-01 PROCEDURE — 36620 INSERTION CATHETER ARTERY: CPT

## 2024-01-01 PROCEDURE — 96374 THER/PROPH/DIAG INJ IV PUSH: CPT

## 2024-01-01 PROCEDURE — 82550 ASSAY OF CK (CPK): CPT

## 2024-01-01 PROCEDURE — 70450 CT HEAD/BRAIN W/O DYE: CPT | Mod: MC

## 2024-01-01 PROCEDURE — 84100 ASSAY OF PHOSPHORUS: CPT

## 2024-01-01 PROCEDURE — P9037: CPT

## 2024-01-01 PROCEDURE — 36415 COLL VENOUS BLD VENIPUNCTURE: CPT

## 2024-01-01 PROCEDURE — 82435 ASSAY OF BLOOD CHLORIDE: CPT

## 2024-01-01 PROCEDURE — 76700 US EXAM ABDOM COMPLETE: CPT | Mod: 26

## 2024-01-01 PROCEDURE — C1894: CPT

## 2024-01-01 PROCEDURE — 82962 GLUCOSE BLOOD TEST: CPT

## 2024-01-01 PROCEDURE — 94002 VENT MGMT INPAT INIT DAY: CPT

## 2024-01-01 PROCEDURE — 71045 X-RAY EXAM CHEST 1 VIEW: CPT | Mod: 26

## 2024-01-01 PROCEDURE — 71260 CT THORAX DX C+: CPT | Mod: MC

## 2024-01-01 PROCEDURE — 70496 CT ANGIOGRAPHY HEAD: CPT | Mod: 26,MC

## 2024-01-01 PROCEDURE — 70450 CT HEAD/BRAIN W/O DYE: CPT | Mod: 26,MC

## 2024-01-01 PROCEDURE — 85018 HEMOGLOBIN: CPT

## 2024-01-01 PROCEDURE — 83735 ASSAY OF MAGNESIUM: CPT

## 2024-01-01 PROCEDURE — 80048 BASIC METABOLIC PNL TOTAL CA: CPT

## 2024-01-01 PROCEDURE — 93010 ELECTROCARDIOGRAM REPORT: CPT

## 2024-01-01 PROCEDURE — 72125 CT NECK SPINE W/O DYE: CPT | Mod: MC

## 2024-01-01 PROCEDURE — 80053 COMPREHEN METABOLIC PANEL: CPT

## 2024-01-01 PROCEDURE — 84132 ASSAY OF SERUM POTASSIUM: CPT

## 2024-01-01 PROCEDURE — 85610 PROTHROMBIN TIME: CPT

## 2024-01-01 PROCEDURE — 70498 CT ANGIOGRAPHY NECK: CPT | Mod: MC

## 2024-01-01 PROCEDURE — 86900 BLOOD TYPING SEROLOGIC ABO: CPT

## 2024-01-01 PROCEDURE — 87040 BLOOD CULTURE FOR BACTERIA: CPT

## 2024-01-01 PROCEDURE — 85379 FIBRIN DEGRADATION QUANT: CPT

## 2024-01-01 PROCEDURE — 86850 RBC ANTIBODY SCREEN: CPT

## 2024-01-01 PROCEDURE — 94640 AIRWAY INHALATION TREATMENT: CPT

## 2024-01-01 PROCEDURE — 86923 COMPATIBILITY TEST ELECTRIC: CPT

## 2024-01-01 PROCEDURE — 84295 ASSAY OF SERUM SODIUM: CPT

## 2024-01-01 PROCEDURE — 83690 ASSAY OF LIPASE: CPT

## 2024-01-01 PROCEDURE — 85730 THROMBOPLASTIN TIME PARTIAL: CPT

## 2024-01-01 PROCEDURE — 76700 US EXAM ABDOM COMPLETE: CPT

## 2024-01-01 PROCEDURE — 84300 ASSAY OF URINE SODIUM: CPT

## 2024-01-01 PROCEDURE — 83036 HEMOGLOBIN GLYCOSYLATED A1C: CPT

## 2024-01-01 PROCEDURE — 93970 EXTREMITY STUDY: CPT

## 2024-01-01 PROCEDURE — 74177 CT ABD & PELVIS W/CONTRAST: CPT | Mod: MC

## 2024-01-01 PROCEDURE — 82330 ASSAY OF CALCIUM: CPT

## 2024-01-01 PROCEDURE — 82947 ASSAY GLUCOSE BLOOD QUANT: CPT

## 2024-01-01 PROCEDURE — 85014 HEMATOCRIT: CPT

## 2024-01-01 PROCEDURE — 72125 CT NECK SPINE W/O DYE: CPT | Mod: 26,MC

## 2024-01-01 PROCEDURE — 36556 INSERT NON-TUNNEL CV CATH: CPT

## 2024-01-01 PROCEDURE — P9100: CPT

## 2024-01-01 PROCEDURE — 82010 KETONE BODYS QUAN: CPT

## 2024-01-01 PROCEDURE — 84443 ASSAY THYROID STIM HORMONE: CPT

## 2024-01-01 PROCEDURE — P9073: CPT

## 2024-01-01 PROCEDURE — 70486 CT MAXILLOFACIAL W/O DYE: CPT | Mod: 26,MA

## 2024-01-01 RX ORDER — SODIUM,POTASSIUM PHOSPHATES 278-250MG
1 POWDER IN PACKET (EA) ORAL ONCE
Refills: 0 | Status: COMPLETED | OUTPATIENT
Start: 2024-01-01 | End: 2024-01-01

## 2024-01-01 RX ORDER — LEVETIRACETAM 250 MG/1
1500 TABLET, FILM COATED ORAL ONCE
Refills: 0 | Status: COMPLETED | OUTPATIENT
Start: 2024-01-01 | End: 2024-01-01

## 2024-01-01 RX ORDER — POTASSIUM PHOSPHATE, MONOBASIC POTASSIUM PHOSPHATE, DIBASIC 236; 224 MG/ML; MG/ML
30 INJECTION, SOLUTION INTRAVENOUS ONCE
Refills: 0 | Status: DISCONTINUED | OUTPATIENT
Start: 2024-01-01 | End: 2024-01-01

## 2024-01-01 RX ORDER — MAGNESIUM SULFATE 500 MG/ML
1 VIAL (ML) INJECTION ONCE
Refills: 0 | Status: COMPLETED | OUTPATIENT
Start: 2024-01-01 | End: 2024-01-01

## 2024-01-01 RX ORDER — POLYETHYLENE GLYCOL 3350 17 G/17G
17 POWDER, FOR SOLUTION ORAL
Refills: 0 | Status: DISCONTINUED | OUTPATIENT
Start: 2024-01-01 | End: 2024-01-01

## 2024-01-01 RX ORDER — POTASSIUM CHLORIDE 20 MEQ
40 PACKET (EA) ORAL EVERY 4 HOURS
Refills: 0 | Status: DISCONTINUED | OUTPATIENT
Start: 2024-01-01 | End: 2024-01-01

## 2024-01-01 RX ORDER — IPRATROPIUM/ALBUTEROL SULFATE 18-103MCG
3 AEROSOL WITH ADAPTER (GRAM) INHALATION EVERY 6 HOURS
Refills: 0 | Status: DISCONTINUED | OUTPATIENT
Start: 2024-01-01 | End: 2024-01-01

## 2024-01-01 RX ORDER — DEXTROSE 50 % IN WATER 50 %
25 SYRINGE (ML) INTRAVENOUS ONCE
Refills: 0 | Status: DISCONTINUED | OUTPATIENT
Start: 2024-01-01 | End: 2024-01-01

## 2024-01-01 RX ORDER — ACETAMINOPHEN 500 MG
1000 TABLET ORAL ONCE
Refills: 0 | Status: COMPLETED | OUTPATIENT
Start: 2024-01-01 | End: 2024-01-01

## 2024-01-01 RX ORDER — CHLORHEXIDINE GLUCONATE 213 G/1000ML
15 SOLUTION TOPICAL EVERY 12 HOURS
Refills: 0 | Status: DISCONTINUED | OUTPATIENT
Start: 2024-01-01 | End: 2024-01-01

## 2024-01-01 RX ORDER — MANNITOL
83 POWDER (GRAM) MISCELLANEOUS ONCE
Refills: 0 | Status: COMPLETED | OUTPATIENT
Start: 2024-01-01 | End: 2024-01-01

## 2024-01-01 RX ORDER — ACETAMINOPHEN 500 MG
650 TABLET ORAL EVERY 6 HOURS
Refills: 0 | Status: DISCONTINUED | OUTPATIENT
Start: 2024-01-01 | End: 2024-01-01

## 2024-01-01 RX ORDER — MORPHINE SULFATE 50 MG/1
2 CAPSULE, EXTENDED RELEASE ORAL ONCE
Refills: 0 | Status: DISCONTINUED | OUTPATIENT
Start: 2024-01-01 | End: 2024-01-01

## 2024-01-01 RX ORDER — POTASSIUM CHLORIDE 20 MEQ
40 PACKET (EA) ORAL EVERY 4 HOURS
Refills: 0 | Status: COMPLETED | OUTPATIENT
Start: 2024-01-01 | End: 2024-01-01

## 2024-01-01 RX ORDER — FENTANYL CITRATE 50 UG/ML
100 INJECTION INTRAVENOUS ONCE
Refills: 0 | Status: DISCONTINUED | OUTPATIENT
Start: 2024-01-01 | End: 2024-01-01

## 2024-01-01 RX ORDER — PROPOFOL 10 MG/ML
75 INJECTION, EMULSION INTRAVENOUS
Qty: 1000 | Refills: 0 | Status: DISCONTINUED | OUTPATIENT
Start: 2024-01-01 | End: 2024-01-01

## 2024-01-01 RX ORDER — DEXTROSE 50 % IN WATER 50 %
25 SYRINGE (ML) INTRAVENOUS ONCE
Refills: 0 | Status: COMPLETED | OUTPATIENT
Start: 2024-01-01 | End: 2024-01-01

## 2024-01-01 RX ORDER — SODIUM CHLORIDE 9 MG/ML
500 INJECTION, SOLUTION INTRAVENOUS ONCE
Refills: 0 | Status: COMPLETED | OUTPATIENT
Start: 2024-01-01 | End: 2024-01-01

## 2024-01-01 RX ORDER — POTASSIUM CHLORIDE 20 MEQ
10 PACKET (EA) ORAL
Refills: 0 | Status: COMPLETED | OUTPATIENT
Start: 2024-01-01 | End: 2024-01-01

## 2024-01-01 RX ORDER — ROBINUL 0.2 MG/ML
0.2 INJECTION INTRAMUSCULAR; INTRAVENOUS ONCE
Refills: 0 | Status: COMPLETED | OUTPATIENT
Start: 2024-01-01 | End: 2024-01-01

## 2024-01-01 RX ORDER — DEXMEDETOMIDINE HYDROCHLORIDE IN 0.9% SODIUM CHLORIDE 4 UG/ML
0.2 INJECTION INTRAVENOUS
Qty: 200 | Refills: 0 | Status: DISCONTINUED | OUTPATIENT
Start: 2024-01-01 | End: 2024-01-01

## 2024-01-01 RX ORDER — SODIUM CHLORIDE 9 MG/ML
1000 INJECTION, SOLUTION INTRAVENOUS ONCE
Refills: 0 | Status: COMPLETED | OUTPATIENT
Start: 2024-01-01 | End: 2024-01-01

## 2024-01-01 RX ORDER — PHENOBARBITAL 60 MG
80 TABLET ORAL THREE TIMES A DAY
Refills: 0 | Status: DISCONTINUED | OUTPATIENT
Start: 2024-01-01 | End: 2024-01-01

## 2024-01-01 RX ORDER — PIPERACILLIN AND TAZOBACTAM 4; .5 G/20ML; G/20ML
3.38 INJECTION, POWDER, LYOPHILIZED, FOR SOLUTION INTRAVENOUS EVERY 8 HOURS
Refills: 0 | Status: DISCONTINUED | OUTPATIENT
Start: 2024-01-01 | End: 2024-01-01

## 2024-01-01 RX ORDER — SODIUM CHLORIDE 9 MG/ML
1000 INJECTION, SOLUTION INTRAVENOUS
Refills: 0 | Status: DISCONTINUED | OUTPATIENT
Start: 2024-01-01 | End: 2024-01-01

## 2024-01-01 RX ORDER — MANNITOL
80 POWDER (GRAM) MISCELLANEOUS ONCE
Refills: 0 | Status: DISCONTINUED | OUTPATIENT
Start: 2024-01-01 | End: 2024-01-01

## 2024-01-01 RX ORDER — GLUCAGON INJECTION, SOLUTION 0.5 MG/.1ML
1 INJECTION, SOLUTION SUBCUTANEOUS ONCE
Refills: 0 | Status: DISCONTINUED | OUTPATIENT
Start: 2024-01-01 | End: 2024-01-01

## 2024-01-01 RX ORDER — SODIUM CHLORIDE 9 MG/ML
1000 INJECTION INTRAMUSCULAR; INTRAVENOUS; SUBCUTANEOUS ONCE
Refills: 0 | Status: DISCONTINUED | OUTPATIENT
Start: 2024-01-01 | End: 2024-01-01

## 2024-01-01 RX ORDER — DEXTROSE 50 % IN WATER 50 %
15 SYRINGE (ML) INTRAVENOUS ONCE
Refills: 0 | Status: DISCONTINUED | OUTPATIENT
Start: 2024-01-01 | End: 2024-01-01

## 2024-01-01 RX ORDER — POTASSIUM CHLORIDE 20 MEQ
20 PACKET (EA) ORAL ONCE
Refills: 0 | Status: COMPLETED | OUTPATIENT
Start: 2024-01-01 | End: 2024-01-01

## 2024-01-01 RX ORDER — ACETYLCYSTEINE 200 MG/ML
4 VIAL (ML) MISCELLANEOUS EVERY 4 HOURS
Refills: 0 | Status: DISCONTINUED | OUTPATIENT
Start: 2024-01-01 | End: 2024-01-01

## 2024-01-01 RX ORDER — SODIUM CHLORIDE 9 MG/ML
30 INJECTION INTRAMUSCULAR; INTRAVENOUS; SUBCUTANEOUS ONCE
Refills: 0 | Status: COMPLETED | OUTPATIENT
Start: 2024-01-01 | End: 2024-01-01

## 2024-01-01 RX ORDER — INSULIN LISPRO 100/ML
VIAL (ML) SUBCUTANEOUS
Refills: 0 | Status: DISCONTINUED | OUTPATIENT
Start: 2024-01-01 | End: 2024-01-01

## 2024-01-01 RX ORDER — MAGNESIUM SULFATE 500 MG/ML
2 VIAL (ML) INJECTION ONCE
Refills: 0 | Status: COMPLETED | OUTPATIENT
Start: 2024-01-01 | End: 2024-01-01

## 2024-01-01 RX ORDER — CALCIUM GLUCONATE 100 MG/ML
2 VIAL (ML) INTRAVENOUS ONCE
Refills: 0 | Status: COMPLETED | OUTPATIENT
Start: 2024-01-01 | End: 2024-01-01

## 2024-01-01 RX ORDER — FOLIC ACID 0.8 MG
1 TABLET ORAL DAILY
Refills: 0 | Status: DISCONTINUED | OUTPATIENT
Start: 2024-01-01 | End: 2024-01-01

## 2024-01-01 RX ORDER — HEPARIN SODIUM 5000 [USP'U]/ML
30000 INJECTION INTRAVENOUS; SUBCUTANEOUS ONCE
Refills: 0 | Status: COMPLETED | OUTPATIENT
Start: 2024-01-01 | End: 2024-01-01

## 2024-01-01 RX ORDER — FOLIC ACID 0.8 MG
1 TABLET ORAL ONCE
Refills: 0 | Status: COMPLETED | OUTPATIENT
Start: 2024-01-01 | End: 2024-01-01

## 2024-01-01 RX ORDER — MANNITOL
83 POWDER (GRAM) MISCELLANEOUS ONCE
Refills: 0 | Status: DISCONTINUED | OUTPATIENT
Start: 2024-01-01 | End: 2024-01-01

## 2024-01-01 RX ORDER — PROPOFOL 10 MG/ML
50 INJECTION, EMULSION INTRAVENOUS ONCE
Refills: 0 | Status: COMPLETED | OUTPATIENT
Start: 2024-01-01 | End: 2024-01-01

## 2024-01-01 RX ORDER — SODIUM CHLORIDE 9 MG/ML
10 INJECTION INTRAMUSCULAR; INTRAVENOUS; SUBCUTANEOUS
Refills: 0 | Status: DISCONTINUED | OUTPATIENT
Start: 2024-01-01 | End: 2024-01-01

## 2024-01-01 RX ORDER — PIPERACILLIN AND TAZOBACTAM 4; .5 G/20ML; G/20ML
3.38 INJECTION, POWDER, LYOPHILIZED, FOR SOLUTION INTRAVENOUS ONCE
Refills: 0 | Status: COMPLETED | OUTPATIENT
Start: 2024-01-01 | End: 2024-01-01

## 2024-01-01 RX ORDER — SODIUM CHLORIDE 5 G/100ML
1000 INJECTION, SOLUTION INTRAVENOUS
Refills: 0 | Status: DISCONTINUED | OUTPATIENT
Start: 2024-01-01 | End: 2024-01-01

## 2024-01-01 RX ORDER — OXYCODONE HYDROCHLORIDE 5 MG/1
5 TABLET ORAL EVERY 4 HOURS
Refills: 0 | Status: DISCONTINUED | OUTPATIENT
Start: 2024-01-01 | End: 2024-01-01

## 2024-01-01 RX ORDER — CALCIUM GLUCONATE 100 MG/ML
1 VIAL (ML) INTRAVENOUS ONCE
Refills: 0 | Status: COMPLETED | OUTPATIENT
Start: 2024-01-01 | End: 2024-01-01

## 2024-01-01 RX ORDER — SODIUM CHLORIDE 9 MG/ML
1500 INJECTION INTRAMUSCULAR; INTRAVENOUS; SUBCUTANEOUS ONCE
Refills: 0 | Status: COMPLETED | OUTPATIENT
Start: 2024-01-01 | End: 2024-01-01

## 2024-01-01 RX ORDER — DEXTROSE 50 % IN WATER 50 %
50 SYRINGE (ML) INTRAVENOUS ONCE
Refills: 0 | Status: DISCONTINUED | OUTPATIENT
Start: 2024-01-01 | End: 2024-01-01

## 2024-01-01 RX ORDER — DEXTROSE 50 % IN WATER 50 %
12.5 SYRINGE (ML) INTRAVENOUS ONCE
Refills: 0 | Status: DISCONTINUED | OUTPATIENT
Start: 2024-01-01 | End: 2024-01-01

## 2024-01-01 RX ORDER — THIAMINE MONONITRATE (VIT B1) 100 MG
100 TABLET ORAL DAILY
Refills: 0 | Status: DISCONTINUED | OUTPATIENT
Start: 2024-01-01 | End: 2024-01-01

## 2024-01-01 RX ORDER — DIAZEPAM 5 MG
5 TABLET ORAL ONCE
Refills: 0 | Status: DISCONTINUED | OUTPATIENT
Start: 2024-01-01 | End: 2024-01-01

## 2024-01-01 RX ORDER — DEXTROSE 10 % IN WATER 10 %
125 INTRAVENOUS SOLUTION INTRAVENOUS ONCE
Refills: 0 | Status: DISCONTINUED | OUTPATIENT
Start: 2024-01-01 | End: 2024-01-01

## 2024-01-01 RX ORDER — SENNA PLUS 8.6 MG/1
2 TABLET ORAL AT BEDTIME
Refills: 0 | Status: DISCONTINUED | OUTPATIENT
Start: 2024-01-01 | End: 2024-01-01

## 2024-01-01 RX ORDER — LEVETIRACETAM 250 MG/1
500 TABLET, FILM COATED ORAL EVERY 12 HOURS
Refills: 0 | Status: DISCONTINUED | OUTPATIENT
Start: 2024-01-01 | End: 2024-01-01

## 2024-01-01 RX ORDER — POTASSIUM CHLORIDE 20 MEQ
20 PACKET (EA) ORAL
Refills: 0 | Status: COMPLETED | OUTPATIENT
Start: 2024-01-01 | End: 2024-01-01

## 2024-01-01 RX ORDER — POTASSIUM CHLORIDE 20 MEQ
20 PACKET (EA) ORAL
Refills: 0 | Status: DISCONTINUED | OUTPATIENT
Start: 2024-01-01 | End: 2024-01-01

## 2024-01-01 RX ORDER — TRANEXAMIC ACID 100 MG/ML
1000 INJECTION, SOLUTION INTRAVENOUS ONCE
Refills: 0 | Status: COMPLETED | OUTPATIENT
Start: 2024-01-01 | End: 2024-01-01

## 2024-01-01 RX ORDER — THIAMINE MONONITRATE (VIT B1) 100 MG
100 TABLET ORAL ONCE
Refills: 0 | Status: COMPLETED | OUTPATIENT
Start: 2024-01-01 | End: 2024-01-01

## 2024-01-01 RX ORDER — LEVETIRACETAM 250 MG/1
750 TABLET, FILM COATED ORAL
Refills: 0 | Status: DISCONTINUED | OUTPATIENT
Start: 2024-01-01 | End: 2024-01-01

## 2024-01-01 RX ORDER — MORPHINE SULFATE 50 MG/1
4 CAPSULE, EXTENDED RELEASE ORAL ONCE
Refills: 0 | Status: DISCONTINUED | OUTPATIENT
Start: 2024-01-01 | End: 2024-01-01

## 2024-01-01 RX ORDER — SODIUM CHLORIDE 9 MG/ML
600 INJECTION INTRAMUSCULAR; INTRAVENOUS; SUBCUTANEOUS ONCE
Refills: 0 | Status: COMPLETED | OUTPATIENT
Start: 2024-01-01 | End: 2024-01-01

## 2024-01-01 RX ORDER — LEVETIRACETAM 250 MG/1
3000 TABLET, FILM COATED ORAL ONCE
Refills: 0 | Status: DISCONTINUED | OUTPATIENT
Start: 2024-01-01 | End: 2024-01-01

## 2024-01-01 RX ORDER — CHLORHEXIDINE GLUCONATE 213 G/1000ML
1 SOLUTION TOPICAL DAILY
Refills: 0 | Status: DISCONTINUED | OUTPATIENT
Start: 2024-01-01 | End: 2024-01-01

## 2024-01-01 RX ORDER — OXYCODONE HYDROCHLORIDE 5 MG/1
10 TABLET ORAL EVERY 4 HOURS
Refills: 0 | Status: DISCONTINUED | OUTPATIENT
Start: 2024-01-01 | End: 2024-01-01

## 2024-01-01 RX ORDER — TRANEXAMIC ACID 100 MG/ML
1.49 INJECTION, SOLUTION INTRAVENOUS
Qty: 1000 | Refills: 0 | Status: DISCONTINUED | OUTPATIENT
Start: 2024-01-01 | End: 2024-01-01

## 2024-01-01 RX ORDER — PHENOBARBITAL 60 MG
68 TABLET ORAL EVERY 6 HOURS
Refills: 0 | Status: DISCONTINUED | OUTPATIENT
Start: 2024-01-01 | End: 2024-01-01

## 2024-01-01 RX ORDER — LEVETIRACETAM 250 MG/1
500 TABLET, FILM COATED ORAL
Refills: 0 | Status: DISCONTINUED | OUTPATIENT
Start: 2024-01-01 | End: 2024-01-01

## 2024-01-01 RX ORDER — POTASSIUM CHLORIDE 20 MEQ
40 PACKET (EA) ORAL ONCE
Refills: 0 | Status: COMPLETED | OUTPATIENT
Start: 2024-01-01 | End: 2024-01-01

## 2024-01-01 RX ORDER — TRANEXAMIC ACID 100 MG/ML
1000 INJECTION, SOLUTION INTRAVENOUS EVERY 8 HOURS
Refills: 0 | Status: DISCONTINUED | OUTPATIENT
Start: 2024-01-01 | End: 2024-01-01

## 2024-01-01 RX ORDER — DESMOPRESSIN ACETATE 0.1 MG/1
34 TABLET ORAL ONCE
Refills: 0 | Status: COMPLETED | OUTPATIENT
Start: 2024-01-01 | End: 2024-01-01

## 2024-01-01 RX ADMIN — PROPOFOL 50 MILLIGRAM(S): 10 INJECTION, EMULSION INTRAVENOUS at 05:50

## 2024-01-01 RX ADMIN — PROPOFOL 37.8 MICROGRAM(S)/KG/MIN: 10 INJECTION, EMULSION INTRAVENOUS at 07:20

## 2024-01-01 RX ADMIN — POLYETHYLENE GLYCOL 3350 17 GRAM(S): 17 POWDER, FOR SOLUTION ORAL at 17:51

## 2024-01-01 RX ADMIN — SODIUM CHLORIDE 30 MILLILITER(S): 5 INJECTION, SOLUTION INTRAVENOUS at 17:53

## 2024-01-01 RX ADMIN — Medication 100 MILLIGRAM(S): at 11:41

## 2024-01-01 RX ADMIN — TRANEXAMIC ACID 62.5 MG/KG/HR: 100 INJECTION, SOLUTION INTRAVENOUS at 06:32

## 2024-01-01 RX ADMIN — Medication 4 MILLILITER(S): at 13:03

## 2024-01-01 RX ADMIN — ROBINUL 0.2 MILLIGRAM(S): 0.2 INJECTION INTRAMUSCULAR; INTRAVENOUS at 13:50

## 2024-01-01 RX ADMIN — Medication 50 MILLIEQUIVALENT(S): at 12:18

## 2024-01-01 RX ADMIN — Medication 80 MILLIGRAM(S): at 21:11

## 2024-01-01 RX ADMIN — DEXMEDETOMIDINE HYDROCHLORIDE IN 0.9% SODIUM CHLORIDE 4.2 MICROGRAM(S)/KG/HR: 4 INJECTION INTRAVENOUS at 14:17

## 2024-01-01 RX ADMIN — LEVETIRACETAM 400 MILLIGRAM(S): 250 TABLET, FILM COATED ORAL at 04:02

## 2024-01-01 RX ADMIN — Medication 1 MILLIGRAM(S): at 11:41

## 2024-01-01 RX ADMIN — Medication 68 MILLIGRAM(S): at 04:06

## 2024-01-01 RX ADMIN — Medication 80 MILLIGRAM(S): at 13:37

## 2024-01-01 RX ADMIN — FENTANYL CITRATE 100 MICROGRAM(S): 50 INJECTION INTRAVENOUS at 23:15

## 2024-01-01 RX ADMIN — Medication 1 DROP(S): at 12:00

## 2024-01-01 RX ADMIN — Medication 650 MILLIGRAM(S): at 21:10

## 2024-01-01 RX ADMIN — CHLORHEXIDINE GLUCONATE 1 APPLICATION(S): 213 SOLUTION TOPICAL at 12:22

## 2024-01-01 RX ADMIN — Medication 1 DROP(S): at 01:50

## 2024-01-01 RX ADMIN — SODIUM CHLORIDE 30 MILLILITER(S): 5 INJECTION, SOLUTION INTRAVENOUS at 19:00

## 2024-01-01 RX ADMIN — DESMOPRESSIN ACETATE 234 MICROGRAM(S): 0.1 TABLET ORAL at 21:15

## 2024-01-01 RX ADMIN — LEVETIRACETAM 400 MILLIGRAM(S): 250 TABLET, FILM COATED ORAL at 06:04

## 2024-01-01 RX ADMIN — PIPERACILLIN AND TAZOBACTAM 25 GRAM(S): 4; .5 INJECTION, POWDER, LYOPHILIZED, FOR SOLUTION INTRAVENOUS at 05:20

## 2024-01-01 RX ADMIN — Medication 40 MILLIEQUIVALENT(S): at 06:03

## 2024-01-01 RX ADMIN — Medication 1 DROP(S): at 23:18

## 2024-01-01 RX ADMIN — MORPHINE SULFATE 4 MILLIGRAM(S): 50 CAPSULE, EXTENDED RELEASE ORAL at 14:35

## 2024-01-01 RX ADMIN — Medication 100 MILLIGRAM(S): at 12:26

## 2024-01-01 RX ADMIN — Medication 100 GRAM(S): at 21:35

## 2024-01-01 RX ADMIN — POLYETHYLENE GLYCOL 3350 17 GRAM(S): 17 POWDER, FOR SOLUTION ORAL at 05:21

## 2024-01-01 RX ADMIN — Medication 1660 GRAM(S): at 04:01

## 2024-01-01 RX ADMIN — Medication 80 MILLIGRAM(S): at 14:43

## 2024-01-01 RX ADMIN — Medication 1 DROP(S): at 21:55

## 2024-01-01 RX ADMIN — Medication 1 TABLET(S): at 11:41

## 2024-01-01 RX ADMIN — PROPOFOL 37.8 MICROGRAM(S)/KG/MIN: 10 INJECTION, EMULSION INTRAVENOUS at 07:12

## 2024-01-01 RX ADMIN — Medication 3 MILLILITER(S): at 11:17

## 2024-01-01 RX ADMIN — Medication 4 MILLILITER(S): at 10:12

## 2024-01-01 RX ADMIN — Medication 1 MILLIGRAM(S): at 03:07

## 2024-01-01 RX ADMIN — Medication 80 MILLIGRAM(S): at 21:54

## 2024-01-01 RX ADMIN — Medication 3 MILLILITER(S): at 00:17

## 2024-01-01 RX ADMIN — Medication 102 GRAM(S): at 09:19

## 2024-01-01 RX ADMIN — Medication 100 MILLIEQUIVALENT(S): at 18:14

## 2024-01-01 RX ADMIN — Medication 650 MILLIGRAM(S): at 21:40

## 2024-01-01 RX ADMIN — SENNA PLUS 2 TABLET(S): 8.6 TABLET ORAL at 20:54

## 2024-01-01 RX ADMIN — Medication 1 PACKET(S): at 06:03

## 2024-01-01 RX ADMIN — Medication 50 MILLIEQUIVALENT(S): at 14:00

## 2024-01-01 RX ADMIN — Medication 100 MILLIGRAM(S): at 18:58

## 2024-01-01 RX ADMIN — Medication 80 MILLIGRAM(S): at 06:15

## 2024-01-01 RX ADMIN — SODIUM CHLORIDE 30 MILLILITER(S): 5 INJECTION, SOLUTION INTRAVENOUS at 04:01

## 2024-01-01 RX ADMIN — Medication 50 MILLIEQUIVALENT(S): at 20:18

## 2024-01-01 RX ADMIN — Medication 3 MILLILITER(S): at 05:22

## 2024-01-01 RX ADMIN — Medication 50 MILLIGRAM(S): at 03:07

## 2024-01-01 RX ADMIN — Medication 40 MILLIEQUIVALENT(S): at 21:10

## 2024-01-01 RX ADMIN — CHLORHEXIDINE GLUCONATE 15 MILLILITER(S): 213 SOLUTION TOPICAL at 06:05

## 2024-01-01 RX ADMIN — Medication 5 MILLIGRAM(S): at 20:39

## 2024-01-01 RX ADMIN — SODIUM CHLORIDE 30 MILLILITER(S): 9 INJECTION INTRAMUSCULAR; INTRAVENOUS; SUBCUTANEOUS at 04:02

## 2024-01-01 RX ADMIN — Medication 1 DROP(S): at 08:00

## 2024-01-01 RX ADMIN — Medication 40 MILLIEQUIVALENT(S): at 17:33

## 2024-01-01 RX ADMIN — LEVETIRACETAM 400 MILLIGRAM(S): 250 TABLET, FILM COATED ORAL at 05:21

## 2024-01-01 RX ADMIN — Medication 1 TABLET(S): at 10:05

## 2024-01-01 RX ADMIN — Medication 100 MILLIEQUIVALENT(S): at 05:31

## 2024-01-01 RX ADMIN — Medication 1 MILLIGRAM(S): at 20:42

## 2024-01-01 RX ADMIN — PIPERACILLIN AND TAZOBACTAM 25 GRAM(S): 4; .5 INJECTION, POWDER, LYOPHILIZED, FOR SOLUTION INTRAVENOUS at 23:18

## 2024-01-01 RX ADMIN — Medication 1 DROP(S): at 10:00

## 2024-01-01 RX ADMIN — Medication 100 MILLIEQUIVALENT(S): at 19:00

## 2024-01-01 RX ADMIN — SODIUM CHLORIDE 500 MILLILITER(S): 9 INJECTION, SOLUTION INTRAVENOUS at 03:00

## 2024-01-01 RX ADMIN — SODIUM CHLORIDE 1000 MILLILITER(S): 9 INJECTION INTRAMUSCULAR; INTRAVENOUS; SUBCUTANEOUS at 20:17

## 2024-01-01 RX ADMIN — Medication 50 MILLIEQUIVALENT(S): at 21:11

## 2024-01-01 RX ADMIN — TRANEXAMIC ACID 220 MILLIGRAM(S): 100 INJECTION, SOLUTION INTRAVENOUS at 18:22

## 2024-01-01 RX ADMIN — HEPARIN SODIUM 30000 UNIT(S): 5000 INJECTION INTRAVENOUS; SUBCUTANEOUS at 14:39

## 2024-01-01 RX ADMIN — Medication 20 MILLIEQUIVALENT(S): at 21:53

## 2024-01-01 RX ADMIN — SODIUM CHLORIDE 500 MILLILITER(S): 9 INJECTION, SOLUTION INTRAVENOUS at 02:00

## 2024-01-01 RX ADMIN — Medication 2 MILLIGRAM(S): at 14:46

## 2024-01-01 RX ADMIN — DEXMEDETOMIDINE HYDROCHLORIDE IN 0.9% SODIUM CHLORIDE 4.2 MICROGRAM(S)/KG/HR: 4 INJECTION INTRAVENOUS at 10:31

## 2024-01-01 RX ADMIN — FENTANYL CITRATE 100 MICROGRAM(S): 50 INJECTION INTRAVENOUS at 23:00

## 2024-01-01 RX ADMIN — Medication 4 MILLILITER(S): at 05:23

## 2024-01-01 RX ADMIN — CHLORHEXIDINE GLUCONATE 15 MILLILITER(S): 213 SOLUTION TOPICAL at 05:20

## 2024-01-01 RX ADMIN — LEVETIRACETAM 400 MILLIGRAM(S): 250 TABLET, FILM COATED ORAL at 20:51

## 2024-01-01 RX ADMIN — Medication 2 MILLIGRAM(S): at 14:53

## 2024-01-01 RX ADMIN — Medication 1 PACKET(S): at 20:18

## 2024-01-01 RX ADMIN — Medication 1 DROP(S): at 04:11

## 2024-01-01 RX ADMIN — CHLORHEXIDINE GLUCONATE 15 MILLILITER(S): 213 SOLUTION TOPICAL at 17:51

## 2024-01-01 RX ADMIN — Medication 25 MILLILITER(S): at 09:15

## 2024-01-01 RX ADMIN — CHLORHEXIDINE GLUCONATE 15 MILLILITER(S): 213 SOLUTION TOPICAL at 17:34

## 2024-01-01 RX ADMIN — Medication 4 MILLILITER(S): at 00:17

## 2024-01-01 RX ADMIN — SODIUM CHLORIDE 1000 MILLILITER(S): 9 INJECTION, SOLUTION INTRAVENOUS at 18:57

## 2024-01-01 RX ADMIN — TRANEXAMIC ACID 62.5 MG/KG/HR: 100 INJECTION, SOLUTION INTRAVENOUS at 07:25

## 2024-01-01 RX ADMIN — Medication 80 MILLIGRAM(S): at 05:21

## 2024-01-01 RX ADMIN — SENNA PLUS 2 TABLET(S): 8.6 TABLET ORAL at 21:10

## 2024-01-01 RX ADMIN — SODIUM CHLORIDE 30 MILLILITER(S): 5 INJECTION, SOLUTION INTRAVENOUS at 07:22

## 2024-01-01 RX ADMIN — MORPHINE SULFATE 2 MILLIGRAM(S): 50 CAPSULE, EXTENDED RELEASE ORAL at 13:50

## 2024-01-01 RX ADMIN — Medication 80 MILLIGRAM(S): at 05:45

## 2024-01-01 RX ADMIN — Medication 1 MILLIGRAM(S): at 12:26

## 2024-01-01 RX ADMIN — Medication 20 MILLIEQUIVALENT(S): at 15:50

## 2024-01-01 RX ADMIN — SODIUM CHLORIDE 30 MILLILITER(S): 5 INJECTION, SOLUTION INTRAVENOUS at 07:26

## 2024-01-01 RX ADMIN — LEVETIRACETAM 400 MILLIGRAM(S): 250 TABLET, FILM COATED ORAL at 06:27

## 2024-01-01 RX ADMIN — DEXMEDETOMIDINE HYDROCHLORIDE IN 0.9% SODIUM CHLORIDE 4.2 MICROGRAM(S)/KG/HR: 4 INJECTION INTRAVENOUS at 19:00

## 2024-01-01 RX ADMIN — Medication 200 GRAM(S): at 20:18

## 2024-01-01 RX ADMIN — LEVETIRACETAM 400 MILLIGRAM(S): 250 TABLET, FILM COATED ORAL at 17:53

## 2024-01-01 RX ADMIN — Medication 20 MILLIEQUIVALENT(S): at 17:52

## 2024-01-01 RX ADMIN — CHLORHEXIDINE GLUCONATE 1 APPLICATION(S): 213 SOLUTION TOPICAL at 21:54

## 2024-01-01 RX ADMIN — Medication 1 DROP(S): at 05:46

## 2024-01-01 RX ADMIN — Medication 1000 MILLIGRAM(S): at 09:30

## 2024-01-01 RX ADMIN — LEVETIRACETAM 400 MILLIGRAM(S): 250 TABLET, FILM COATED ORAL at 17:33

## 2024-01-01 RX ADMIN — Medication 100 GRAM(S): at 05:45

## 2024-01-01 RX ADMIN — MORPHINE SULFATE 2 MILLIGRAM(S): 50 CAPSULE, EXTENDED RELEASE ORAL at 14:45

## 2024-01-01 RX ADMIN — Medication 400 MILLIGRAM(S): at 09:00

## 2024-01-01 RX ADMIN — DEXMEDETOMIDINE HYDROCHLORIDE IN 0.9% SODIUM CHLORIDE 4.2 MICROGRAM(S)/KG/HR: 4 INJECTION INTRAVENOUS at 22:50

## 2024-01-01 RX ADMIN — TRANEXAMIC ACID 660 MILLIGRAM(S): 100 INJECTION, SOLUTION INTRAVENOUS at 06:04

## 2024-01-01 RX ADMIN — PROPOFOL 37.8 MICROGRAM(S)/KG/MIN: 10 INJECTION, EMULSION INTRAVENOUS at 19:33

## 2024-01-01 RX ADMIN — PIPERACILLIN AND TAZOBACTAM 200 GRAM(S): 4; .5 INJECTION, POWDER, LYOPHILIZED, FOR SOLUTION INTRAVENOUS at 21:53

## 2024-01-01 RX ADMIN — Medication 25 GRAM(S): at 13:30

## 2024-01-01 RX ADMIN — CHLORHEXIDINE GLUCONATE 15 MILLILITER(S): 213 SOLUTION TOPICAL at 05:21

## 2024-01-01 RX ADMIN — Medication 100 MILLIEQUIVALENT(S): at 15:18

## 2024-01-01 RX ADMIN — Medication 1 TABLET(S): at 12:25

## 2024-01-01 RX ADMIN — Medication 1 DROP(S): at 19:53

## 2024-01-01 RX ADMIN — SODIUM CHLORIDE 600 MILLILITER(S): 9 INJECTION INTRAMUSCULAR; INTRAVENOUS; SUBCUTANEOUS at 06:05

## 2024-01-01 RX ADMIN — POLYETHYLENE GLYCOL 3350 17 GRAM(S): 17 POWDER, FOR SOLUTION ORAL at 05:20

## 2024-01-24 NOTE — ED ADULT TRIAGE NOTE - CHIEF COMPLAINT QUOTE
Patient c/o left side rib pain s/p fall yesterday. Hx. ETOH abuse, last drink this AM. Fell yesterday when intoxicated. Bruising noted to left eye. Denies HA, dizziness, VH/AH and other trauma. Hx. ETOH use and anxiety.

## 2024-01-25 NOTE — ED ADULT NURSE REASSESSMENT NOTE - NS ED NURSE REASSESS COMMENT FT1
Patient A&O4. pt remains at baseline mental status, RR even unlabored completing full sentences. pt resting in stretcher comfortably at this time, no new complaints offered. stretcher lowest position siderails up safety measures in place. Pending results. Comfort and safety maintained.

## 2024-01-25 NOTE — ED PROVIDER NOTE - EYES, MLM
left eye: periorbital swelling and ecchymosis.  Clear bilaterally, pupils equal, round and reactive to light.

## 2024-01-25 NOTE — ED PROVIDER NOTE - PROGRESS NOTE DETAILS
Aliyah Greco M.D. (Resident Physician): Pt able to ambulate. OMFS saw pt for L orbital floor fx. Pt will f/u with them outpatient. Will dc with abx.

## 2024-01-25 NOTE — CONSULT NOTE ADULT - SUBJECTIVE AND OBJECTIVE BOX
43 y.o. M with PMH significant for alcohol use presents to the ED 2 days s/p  fall while intoxicated sustaining left infraorbital floor fracture. Patient endorses loss of consciousness for approximately 2 minutes. Patient is currently uncomfortable, but denies changes to vision. No dyspnea, dysphagia, hypoasthesia, fever, chills. OMFS consulted to evaluate facial fractures and necessity for surgical intervention.    Exam:  ICU Vital Signs Last 24 Hrs  T(C): 36.6 (25 Jan 2024 07:24), Max: 36.7 (24 Jan 2024 23:06)  T(F): 97.8 (25 Jan 2024 07:24), Max: 98.1 (25 Jan 2024 04:24)  HR: 78 (25 Jan 2024 07:24) (78 - 130)  BP: 94/69 (25 Jan 2024 07:24) (80/67 - 125/82)  BP(mean): 73 (25 Jan 2024 04:24) (73 - 73)  ABP: --  ABP(mean): --  RR: 18 (25 Jan 2024 07:24) (16 - 18)  SpO2: 95% (25 Jan 2024 07:24) (95% - 98%)    O2 Parameters below as of 25 Jan 2024 07:24  Patient On (Oxygen Delivery Method): room air    General: calm, resting in bed, AAOX3   Head: normacephalic , no lacerations/abrasions of the head       Maxillofacial: no step deformity to zygomatic arches, supra and infraorbital rims palpable b/l, inferior border of mandible palpable b/l, CNV and VII intact b/l,        Intraoral: no trismus ZEYAD >40mm, no FOM ecchymosis, occlusion stable and reproducible, poor OH  E: no tenderness, no otorrhea, no hugo sign, no tinnitus, hearing grossly intact   E: PERRLA b/l, EOMI b/l, no proptosis, no bony steps noted,  no diplopia, left sided periorbital edema , no subconjuctival hemorrhage.    N: symmetry, no lacerations/abrasions, no tenderness, no rhinnorrhea, no septal hematoma, no crepitus, no mobility or deformities  T: no masses, no LAD, swelling or edema present    CT Maxillofacial:    INTERPRETATION: NONCONTRAST CT OF THE BRAIN  CT OF THE MAXILLOFACIAL BONES    CLINICAL INFORMATION: head trauma s/p mechanical fall, r/o ICH    TECHNIQUE: Axial CT images are obtained from the cranial vertex to the skull base without the administration of IV contrast.    TECHNIQUE: Thin section axial CT images are obtained through the maxillofacial bones and mandible. Images are reformatted in the coronal and sagital planes.      COMPARISON: CT head dated 9/27/2022    CT HEAD FINDINGS:    BRAIN PARENCHYMA: No acute intracranial hemorrhage, infarct, mass effect or midline shift. Gray-white matter differentiation is preserved. Scattered hypodense foci in the bilateral cerebral white matter likely represents microvascular disease in a patient this age.    VENTRICLES AND EXTRA-AXIAL SPACES: Mildly enlarged.    BONES: Intact.    EXTRACRANIAL STRUCTURES: No significant abnormality in the visualized orbits or mastoids.    CT OF THE MAXILLOFACIAL BONES FINDINGS:    Minimally displaced and comminuted left orbital floor fracture with alignment and a small amount of orbital fat within the subjacent left maxillary sinus.The optic globes are smooth and symmetric in contour.  There is bilateral moderate to severe maxillary sinus mucosal thickening, mild right frontal sinus mucosal thickening, and bilateral ethmoid sinus mucosal thickening more prominent on the right. Mild left frontal soft tissue swelling. The mandible is intact. The temporomandibular joints are not dislocated. There are no traumatic hemorrhagic air-fluid levels within the paranasal sinuses.      IMPRESSION:    Minimally displaced and comminuted left orbital floor fracture with alignment and a small amount of orbital fat within the subjacent left maxillary sinus.    No acute intracranial hemorrhage or mass effect.      --- End of Report ---

## 2024-01-25 NOTE — ED PROVIDER NOTE - CARE PROVIDER_API CALL
Jameson Harrison  Oral/Maxillofacial Surgery  [address] [place] [id number]  Phone: [phone]  Fax: [phone]  Follow Up Time: 4-6 Days

## 2024-01-25 NOTE — ED PROVIDER NOTE - NSFOLLOWUPINSTRUCTIONS_ED_ALL_ED_FT
You have been evaluated in the Emergency Department today for evaluation after a fall. You were found to have a left orbital floor fracture.     Please  and take the antibiotic as prescribed.     Please call [phone] to schedule an appointment with the maxillofacial surgeon in 1 week.    Please do not blow your nose, lift anything heavier than 20 lbs, strain with bowel movements, smoke/vape.    For pain, you can take TYLENOL/ACETAMINOPHEN up to 4,000mg a day for your symptoms in four divided doses and MOTRIN/IBUPROFEN up to 2,400mg a day in four divided doses (for up to 5 days with food).    Return to the Emergency Department if you experience uncontrolled pain, fevers 100.4°F or greater, blurry vision, or any other concerning symptoms.    Thank you for choosing us for your care.

## 2024-01-25 NOTE — CONSULT NOTE ADULT - ASSESSMENT
A/P  43 y.o. M with PMH significant for alcohol abuse presents to the ED 2 days s/p fall while intoxicated sustaining left infraorbital floor fracture. Patient currently in pain but with no signs of entrapment clincally or radiographically.       - Sinus precautions  - Unasyn while inpatient, Augmentin 875-125 BIDx 7 days on discharge   - Multimodal pain control   - Follow up with OMFS in 1 week     Rosalia Lambert  Oral and Maxillofacial Surgery   [id number]   Available on teams  A/P  43 y.o. M with PMH significant for alcohol abuse presents to the ED 2 days s/p fall while intoxicated sustaining left infraorbital floor fracture. Patient currently in pain but with no signs of entrapment clinically or radiographically.     - No acute surgical intervention from OMFS standpoint. Will follow patient in outpatient setting.   - Sinus precautions*  - Unasyn while inpatient, Augmentin 875-125 BIDx 7 days on discharge.   - Multimodal pain control.   - Follow up with OMFS in 1 week.     Dr. Jameson Harrison   [facility]  Oral Maxillofacial Surgery Department    [phone] [place] [address], 1st Floor  Ph: [phone]    Rosalia Lambert  Oral and Maxillofacial Surgery   [id number]   Available on teams

## 2024-01-25 NOTE — ED ADULT NURSE NOTE - OBJECTIVE STATEMENT
Breakcoverage rpt JMP. Pt axo4, Pt sleeping awoken for assessment. Pt  calm affect Pt st" I came because I am weak and I fell 2 days ago may have blacked out fell to floor hit left eye and my left ribs hurt. I started drinking again after being sober for apprx 9 years started drinking again week ago . I drink two 6 packs of beer a day...my last drink was yesterday morning. I feel anxious . " vss Placed on cm. HOB elevated. Breakcoverage rpt JMP. Pt axo4, Pt sleeping awoken for assessment. Pt  calm affect Pt st" I came because I am weak and I fell 2 days ago may have blacked out fell to floor hit left eye and my left ribs hurt. I started drinking again after being sober for apprx 9 years started drinking again week ago . I drink two 6 packs of beer a day...my last drink was yesterday morning. I feel anxious . " + ecchymosis left eye upper and lower lid. vss Placed on cm. HOB elevated.

## 2024-01-25 NOTE — ED PROVIDER NOTE - CARDIAC, MLM
tenderness to left middle ribs. no crepitus, paradoxical chest wall movement. skin tenting. Normal rate, regular rhythm.  Heart sounds S1, S2.  No murmurs, rubs or gallops.

## 2024-01-25 NOTE — ED PROVIDER NOTE - ATTENDING APP SHARED VISIT CONTRIBUTION OF CARE
43-year-old male with history of alcohol abuse with prior seizures or withdrawals, last drink yesterday morning, anxiety on as needed clonazepam per patient, presenting to ER after mechanical fall 1 to 2 days ago while intoxicated from standing height.  Now complaining of pain to the left face, periorbital ecchymoses, left-sided upper chest pain.  No vision changes, vomiting, neck or back pain, abdominal pain, pain to upper or lower extremities.    Exam  Mildly tachycardic, but no tremors or tongue fasciculations  Left-sided periorbital ecchymoses, EOMI, visual acuity grossly intact  Tenderness to left periorbital area and zygoma  Mild left chest wall tenderness to upper chest  No midline bony spinal tenderness  Lungs clear bilaterally  Abdomen soft nontender nondistended    Assessment/plan  Mechanical fall  No clinical signs of withdrawal at this time  Possible rib fracture and facial bony fracture  CT head, CT max face, p.o. Librium and Ativan for anxiety and to prevent withdrawal

## 2024-01-25 NOTE — ED PROVIDER NOTE - CLINICAL SUMMARY MEDICAL DECISION MAKING FREE TEXT BOX
Patient is a 42 y/o M with hx of etoh abuse, anxiety presenting for evaluation of head trauma and left rib pain after sustaining a fall 2 days ago. on presentation calm and cooperative, not actively withdrawing. facial and left rib tenderness appreciated on exam. plan for CT head/ face, rib xray

## 2024-01-25 NOTE — ED ADULT NURSE REASSESSMENT NOTE - NS ED NURSE REASSESS COMMENT FT1
Assumed care of pt. Pt is AAOX4. Pt resting comfortably on stretcher at this time. Denies pain at this time. Pt aware we are awaiting consult for further eval of orbital fracture. Will continue to monitor and assess

## 2024-01-25 NOTE — ED PROVIDER NOTE - CONSTITUTIONAL, MLM
Well appearing, awake, alert, oriented to person, place, time/situation and in no apparent distress. no tremors or tongue fasciculations. normal...

## 2024-01-25 NOTE — ED PROVIDER NOTE - OBJECTIVE STATEMENT
Patient is a 44 y/o M with hx of etoh abuse, anxiety presenting for evaluation of head trauma and left rib pain after sustaining a fall 2 days ago. Patient did not seek medical attention at time of incident. He denied LOC, n/v, slurred speech, numbness/ weakness, extremity pain. He admits to drinking daily, last drink this morning (several beers)

## 2024-01-25 NOTE — ED PROVIDER NOTE - PATIENT PORTAL LINK FT
Principal Discharge DX:	Fall  Secondary Diagnosis:	Hip pain  Secondary Diagnosis:	Contusion
You can access the FollowMyHealth Patient Portal offered by [facility] by registering at the following website: [email or web address]. By joining [facility]’s FollowMyHealth portal, you will also be able to view your health information using other applications (apps) compatible with our system.

## 2024-04-07 NOTE — ED ADULT NURSE NOTE - NSFALLRISKINTERV_ED_ALL_ED
Assistance OOB with selected safe patient handling equipment if applicable/Assistance with ambulation/Communicate fall risk and risk factors to all staff, patient, and family/Monitor gait and stability/Monitor for mental status changes and reorient to person, place, and time, as needed/Provide visual cue: yellow wristband, yellow gown, etc/Reinforce activity limits and safety measures with patient and family/Toileting schedule using arm’s reach rule for commode and bathroom/Use of alarms - bed, stretcher, chair and/or video monitoring/Call bell, personal items and telephone in reach/Instruct patient to call for assistance before getting out of bed/chair/stretcher/Non-slip footwear applied when patient is off stretcher/[place] to call system/Physically safe environment - no spills, clutter or unnecessary equipment/Purposeful Proactive Rounding/Room/bathroom lighting operational, light cord in reach

## 2024-04-07 NOTE — ED ADULT NURSE REASSESSMENT NOTE - NS ED NURSE REASSESS COMMENT FT1
Pt unable to give name and  when asked. pt not following commands. MD Acosta and MD Bain made aware. Pt going to CT scan.

## 2024-04-07 NOTE — ED PROVIDER NOTE - PHYSICAL EXAMINATION
Vitals: I have reviewed the patients vital signs  General: not in acute distress, not in acute agitation   HEENT: Airway patent, L frontal hematoma w/o active bleeding or laceration, no other deformity noted, no tongue fasciculations  Eyes: EOMI, tracking appropriately  Neck: no tracheal deviation, no JVD  Chest/Lungs: symmetric chest rise, speaking in complete sentences, no WOB, CTA BL  Heart: skin and extremities well perfused, tachycardia, no LE edema/swelling  Abdomen: soft, nontender and nondistended   Neuro: A+Ox3, CN II-XI intact, mild hand tremors   MSK: strength at baseline in all extremities, no muscle wasting or atrophy  Skin: no cyanosis, no jaundice, no new emergent lesions

## 2024-04-07 NOTE — ED ADULT NURSE NOTE - OBJECTIVE STATEMENT
43 M Axxo4 M arrived from home /co ETOH withdrawal and fall today. PMH ETOH abuse, anxiety, HTN. Pt endorses last drink @11 am (Had 1 glass of wine and 1 tallboy beer), then fell and hit L forehead, denies LOC/AC use. Pt endorses relapse of alcohol starting 3/31/24, drank every day since. Pt endorses HA, dizziness with ambulation, shaking. Pt denies nausea, vomiting, hallucinations. Pt has had seizures from withdrawal in the past. Pt on CM NSR. Pt ambulates independently.

## 2024-04-07 NOTE — ED PROVIDER NOTE - OTHER FINDINGS
ECG recorded at 1907 independently interpreted by me , Dr Gerry Heller,  at 1925 shows normal sinus rhythm normal QRS axis QTc 500 ms, artifact in lead V1 lead V2 and lead V3 limits interpretation is in these leads however no gross ST elevation or ST depression in other leads.  Compared with ECG dated 1/2024: QTc is prolonged.

## 2024-04-07 NOTE — ED PROVIDER NOTE - ATTENDING CONTRIBUTION TO CARE
Attending MD Heller:  I have seen and examined this patient and fully participated in the care of this patient as the teaching attending. I personally made/approved the management plan and take responsibility for the patient management.      43-year-old gentleman with a history of EtOH misuse disorder presenting for evaluation of head trauma and concern for alcohol withdrawal.  Patient states this morning he was walking to the bathroom and tripped on something hitting his head.  He denies loss of consciousness.  Last drink of alcohol was around 11 AM.  He states he feels a little bit shaky but no nausea or vomiting.  He states has been drinking 2 bottles of wine and multiple "tall boys".  Denies pain elsewhere.  Not eating or drinking very much other than alcohol.    Patient's vital signs are notable for heart rate in triage of 130 3 repeat 100 without intervention.  Blood pressure is 130 systolic.  Left frontal hematoma present, scalp is atraumatic otherwise.  Patient is sitting in the stretcher awake and alert fully oriented, slight slurring of speech, he does not appear restless or agitated.  There are no tongue fasciculations.  Mild tremulousness of bilateral upper extremities.  Cervical spine is nontender clear lungs anteriorly nontender chest wall nontender abdomen nontender bony pelvis.  Extremities warm and well-perfused no extremity trauma.    Patient is presenting for evaluation of head trauma in the setting of alcohol misuse, patient states he thinks he is in withdrawal however clinically I believe at this point he is still intoxicated.  Will place on CIWA monitoring for now.  Will obtain screening labs CT head CT cervical spine provide IV thiamine folate fluids and reassessment      *The above represents an initial assessment/impression. Please refer to progress notes for potential changes in patient clinical course*

## 2024-04-07 NOTE — ED PROVIDER NOTE - OBJECTIVE STATEMENT
Patient is a 43 year-old-male with history of alcohol misuse disorder presents for head trauma and concerns for alcohol withdrawal. Patient reports that he tripped this morning and fell forward and hit the front of his head. Denies LOC. Also reports concerns for alcohol withdrawal as he has been through it and he is worries, reports last drink was 11am. Reports feeling shaky but denies other complaints. Denies fever, chills, vomiting, chest pain, shortness of breath, abdominal pain.

## 2024-04-07 NOTE — ED PROVIDER NOTE - PROGRESS NOTE DETAILS
Dave PGY3   Received call from radiology regarding subarachnoid hemorrhage seen in the L frontotemporal region. Neusosurgery consulted and recommended 2u platelet given thrombocytopenia, keppra for seizure ppx, desmopressin and obtaining interval 4hr CTH. Patient reassessed and reports he is feeling anxious still and worries about withdrawal. But otherwise neurologically intact at this time. Attending MD Heller: CT shows left subarachnoid hemorrhage, likely traumatic.  Neurosurgery consulted and will see patient, they recommend IV Keppra for seizure prophylaxis, platelet transfusion for thrombocytopenia.  Will continue close neurochecks for now and initiate IV diazepam for mild alcohol withdrawal. Attending MD Heller: Reassessed patient after diazepam, very calm no significant tremulousness.  Will continue close reassessment. endorsed to me. r hand not functioning, drowsy, dw Neurosurgery and gave update. will repeat brain ct. -Raheem Bain MD- Dave PGY3  Patient became acutely altered and not moving R arm. Patient moved to obtain CTH stat which showed large L frontotemportal intraparenchymal hemorrhage, updated neurosurgery and will admit for NSICU for further management. Will reassess need for airway protection. Acosta PGY3  Patient reassessed upon return from [facility], patient is able to open his eyes on command and have spontaneous speech though unclear. Noted R facial droop with weakness in the R arm and R leg. However protecting his airway at this time, does not warrant an emergent intubation at this time; discussed with neurosurgery Dr. Vargas who is in agreement. Dave PGY3  Notified by nursing that patient is now having slurred speech and not making sense. Immediately reassessed patient and found him to have R facial droop with R arm and R leg weakness, with slurred speech, however patient is able to follow commands. Repeat CTH was immediately obtained which showed large L frontotemportal intraparenchymal hemorrhage, updated neurosurgery and will admit for NSICU for further management. Will reassess need for airway protection. Dave PGY3  Patient reassessed upon return from [facility], patient is able to open his eyes on command and have spontaneous speech though slurred and not making sense. Noted R facial droop with weakness in the R arm and R leg. However protecting his airway at this time, does not warrant an emergent intubation at this time; discussed with neurosurgery Dr. Vargas who is in agreement.

## 2024-04-07 NOTE — ED PROVIDER NOTE - CARE PLAN
1 Principal Discharge DX:	Subarachnoid hemorrhage  Secondary Diagnosis:	Alcohol dependence with withdrawal

## 2024-04-08 NOTE — PROGRESS NOTE ADULT - SUBJECTIVE AND OBJECTIVE BOX
Patient is a 43 year-old-male with history of alcohol misuse disorder presents for head trauma and concerns for alcohol withdrawal. Patient reports that he tripped this morning and fell forward and hit the front of his head. Denies LOC. Also reports concerns for alcohol withdrawal as he has been through it and he is worries, reports last drink was 11am. Reports feeling shaky but denies other complaints. Denies fever, chills, vomiting, chest pain, shortness of breath, abdominal pain. CTH in ED w/ small sylvian fissure tSAH, 4 hour interval scan with worsening IPH    ICH Score 2    Review of Systems  Unable to participate 2/2 neuro injury    Allergies    No Known Allergies    Intolerances    ICU Vital Signs Last 24 Hrs  T(C): 36.9 (08 Apr 2024 00:45), Max: 36.9 (08 Apr 2024 00:34)  T(F): 98.5 (08 Apr 2024 00:45), Max: 98.5 (08 Apr 2024 00:34)  HR: 99 (08 Apr 2024 00:45) (98 - 133)  BP: 145/77 (08 Apr 2024 00:34) (115/80 - 145/77)  BP(mean): 87 (07 Apr 2024 19:32) (87 - 87)  ABP: --  ABP(mean): --  RR: 19 (08 Apr 2024 00:45) (14 - 20)  SpO2: 98% (08 Apr 2024 00:45) (96% - 100%)    O2 Parameters below as of 08 Apr 2024 00:45  Patient On (Oxygen Delivery Method): room air                          12.4   3.25  )-----------( 67       ( 07 Apr 2024 19:01 )             34.6   04-07    130<L>  |  85<L>  |  8   ----------------------------<  92  4.7   |  22  |  0.84    Ca    9.2      07 Apr 2024 19:01  Mg     1.7     04-07    TPro  8.2  /  Alb  4.8  /  TBili  2.0<H>  /  DBili  x   /  AST  342<H>  /  ALT  216<H>  /  AlkPhos  113  04-07    < from: CT Head No Cont (04.07.24 @ 20:17) >  INTERPRETATION:  CLINICAL INDICATION: Status post fall with frontal   hematoma    TECHNIQUE: Noncontrast CT of the head, and cervical spine is performed in   two separate helical CT acquisitions. Images displayed in bone and soft   tissue algorithm, and reviewed in sagittal and coronal reformatted planes.    COMPARISON: Head CT 01/25/2024.    FINDINGS:    HEAD CT:    There is left forehead contusive change/hematoma. No radiopaque foreign   body or gas collection.    Acute subarachnoid blood is present in the left at posterior sylvian and   lateral frontotemporal convexity. No parenchymal hemorrhage or infarct.    There is mild diffuse volume loss without hydrocephalus. No mass effect   or midline shift.    On bony window inspection, there is no calvarial fracture. The   tympanomastoid cavities and included paranasalsinuses are well-aerated.      CERVICAL SPINE CT:    Alignment: Lordosis is straightened but without spondylolisthesis, facet   subluxation or malalignment at the craniovertebral or atlantoaxial   articulations.    Vertebrae: No fracture or focal/aggressive bony lesion    Disc spaces: Preserved in height. No bony spinal stenosis or large   herniation. Neural foramina appear patent as well.    Soft tissues: No prevertebral edema.      IMPRESSION:    HEAD CT:    Acute subarachnoid hemorrhage at left sylvian/frontotemporal convexity.    Case discussed with Dr. Swift at 8:30 PM on 04/07/2024.      CERVICAL SPINE CT:    No acute fracture or traumatic malalignment.    --- End of Report ---    < end of copied text >    < from: CT Chest w/ IV Cont (04.08.24 @ 00:19) >    INTERPRETATION:  CLINICAL INFORMATION: Trauma.    COMPARISON: CTA chest from 9/29/2022. CT abdomen pelvis 9/6/2011    CONTRAST/COMPLICATIONS:  IV Contrast: Omnipaque 350 (accession [id number]), IV contrast documented   in unlinked concurrent exam (accession [id number])  90 cc administered   10   cc discarded  Oral Contrast: NONE  Complications: None reported at time of study completion    PROCEDURE:  CT of the Chest, Abdomen and Pelvis was performed.  Imaging was performed through the chest in the arterial phase followed by   imaging of the abdomen and pelvis in the portal venous phase.  Sagittal and coronal reformats were performed.    FINDINGS:  CHEST:  LUNGS AND LARGE AIRWAYS: Patent central airways. No pulmonary nodules or   parenchymal consolidation.  PLEURA: No pleural effusion.  VESSELS: Within normal limits.  HEART: Heart size is normal. No pericardial effusion.  MEDIASTINUM AND KAREN: No lymphadenopathy.  CHEST WALL AND LOWER NECK: Within normal limits.    ABDOMEN AND PELVIS:  LIVER: Steatosis.  BILE DUCTS: Normal caliber.  GALLBLADDER: Within normal limits.  SPLEEN: Within normal limits.  PANCREAS: Within normal limits.  ADRENALS: Within normal limits.  KIDNEYS/URETERS: Within normal limits.    BLADDER: Within normal limits.  REPRODUCTIVE ORGANS: Prostate within normal limits.    BOWEL: No bowel obstruction. Appendix is normal.  PERITONEUM: No ascites.  VESSELS: Within normal limits.  RETROPERITONEUM/LYMPH NODES: No lymphadenopathy.  ABDOMINAL WALL: Within normal limits.  BONES: Degenerative changes. Fractures of the left anterior sixth and   seventh ribs appear subacute    IMPRESSION:  No acute intrathoracic, abdominal, or pelvic findings.    Fractures of the left anterior sixth and seventh ribs appear subacute    --- End of Report ---    < end of copied text >    < from: CT Abdomen and Pelvis w/ IV Cont (04.08.24 @ 00:19) >  INTERPRETATION:  CLINICAL INFORMATION: Trauma.    COMPARISON: CTA chest from 9/29/2022. CT abdomen pelvis 9/6/2011    CONTRAST/COMPLICATIONS:  IV Contrast: Omnipaque 350 (accession [id number]), IV contrast documented   in unlinked concurrent exam (accession [id number])  90 cc administered   10   cc discarded  Oral Contrast: NONE  Complications: None reported at time of study completion    PROCEDURE:  CT of the Chest, Abdomen and Pelvis was performed.  Imaging was performed through the chest in the arterial phase followed by   imaging of the abdomen and pelvis in the portal venous phase.  Sagittal and coronal reformats were performed.    FINDINGS:  CHEST:  LUNGS AND LARGE AIRWAYS: Patent central airways. No pulmonary nodules or   parenchymal consolidation.  PLEURA: No pleural effusion.  VESSELS: Within normal limits.  HEART: Heart size is normal. No pericardial effusion.  MEDIASTINUM AND KAREN: No lymphadenopathy.  CHEST WALL AND LOWER NECK: Within normal limits.    ABDOMEN AND PELVIS:  LIVER: Steatosis.  BILE DUCTS: Normal caliber.  GALLBLADDER: Within normal limits.  SPLEEN: Within normal limits.  PANCREAS: Within normal limits.  ADRENALS: Within normal limits.  KIDNEYS/URETERS: Within normal limits.    BLADDER: Within normal limits.  REPRODUCTIVE ORGANS: Prostate within normal limits.    BOWEL: No bowel obstruction. Appendix is normal.  PERITONEUM: No ascites.  VESSELS: Within normal limits.  RETROPERITONEUM/LYMPH NODES: No lymphadenopathy.  ABDOMINAL WALL: Within normal limits.  BONES: Degenerative changes. Fractures of the left anterior sixth and   seventh ribs appear subacute    IMPRESSION:  No acute intrathoracic, abdominal, or pelvic findings.    Fractures of the left anterior sixth and seventh ribs appear subacute    --- End of Report ---    < end of copied text >    Physical Exam:  Neuro: Alert, aphasic, unable to follow commands, PERRL, mimics, R side AG, LLE TF, LUE 0/5  Resp: CTA BL  CV: +s1s2, rrr  Abd: soft ntndx4

## 2024-04-08 NOTE — SPEECH LANGUAGE PATHOLOGY EVALUATION - COMMENTS
Continued history:   -ICH Score 2  -ETOH in   -CIWA trigged Ativan PRN  -CTH 4/7: Acute HARRY at L sylvian/frontotemporal convexity.  -CTH: Interval development of large IPH centered in the L subinsular region, measuring ~8.6x3.1x5.2cm. New mass effect in the L cerebellar hemisphere, with effacement of the L lateral ventricle and ~5mm midline shift to the R. Adjacent SAH in the L sylvian fissure is grossly stable. Multiple large dental caries in the upper and lower teeth.  -CERVICAL SPINE CT: No acute fracture or traumatic malalignment.  -CTA NECK: The cervical vasculature is patent without evidence of stenosis or dissection. There is mild atherosclerotic calcification of the right carotid bifurcation/right carotid bulb.  No hemodynamically significant carotid stenosis using NASCET criteria.  -CTA BRAIN: An irregular linear focus of contrast opacification within the acute parenchymal hemorrhage is compatible with active contrast extravasation. No major vessel occlusion, flow-limiting stenosis or aneurysm is identified about the [place]. No evidence of dural venous sinus thrombosis or an arteriovenous malformation.    -4/8: The patient had a decline in exam; afterwhich, an emergent CTH was obtained. CTH now with very large L sided IPH with mass effect, midbrain involvement, IVH and hydrocephelus. Potential interventions were discussed; however, given the patient's minimal response to platelets, the size and severity of the bleed that any neurosurgical intervention would be futile at best, or potentially hasten the patient's demise. As such surgical intervention was deferred, in discussion with the NSCU hypertonics, additional platelets and TXA were given.     Speech & Swallow : NO REPORTS in scm

## 2024-04-08 NOTE — PHYSICAL THERAPY INITIAL EVALUATION ADULT - PERTINENT HX OF CURRENT PROBLEM, REHAB EVAL
PMHx: ETOH. presents for head trauma and concerns for alcohol withdrawal s/p mechanical fall this AM. Pt reports that he tripped this morning, fell forward & hit the front of his head. Denies LOC. Also reports concerns for alcohol withdrawal as he has been through it and he is worries, reports last drink was 11am. Reports feeling shaky but denies other complaints. Denies fever, chills, vomiting, chest pain, shortness of breath, abdominal pain. CTH in ED 4/8: small sylvian fissure tSAH. Na 130, Plt 67, Coags wnl. 4 hour interval scan with worsening IPH. ICH Score 2. Midnight stability CTH 4/8: aggressive increase in hemorrhage volume with positive spot sign, very large L sided IPH with mass effect, midbrain involvement, IVH and hydrocephelus. Upon admission to ICU, emergently intubated for worsening mental status and airway protection    CTH 4/7: Acute HARRY at L sylvian/frontotemporal convexity.  CTH: Interval development of large IPH centered in the L subinsular region, measuring ~8.6x3.1x5.2cm. New mass effect in the L cerebellar hemisphere, with effacement of the L lateral ventricle and ~5mm midline shift to the R. Adjacent SAH in the L sylvian fissure is grossly stable. Multiple large dental caries in the upper and lower teeth.  CERVICAL SPINE CT: No acute fracture or traumatic malalignment.  CTA NECK: The cervical vasculature is patent without evidence of stenosis or dissection. There is mild atherosclerotic calcification of the right carotid bifurcation/right carotid bulb.  No hemodynamically significant carotid stenosis using NASCET criteria.  CTA BRAIN: An irregular linear focus of contrast opacification within the acute parenchymal hemorrhage is compatible with active contrast extravasation. No major vessel occlusion, flow-limiting stenosis or aneurysm is identified about the [place]. No evidence of dural venous sinus thrombosis or an arteriovenous malformation. PMHx: ETOH. presents for head trauma and concerns for alcohol withdrawal s/p mechanical fall this AM. Pt reports that he tripped this morning, fell forward & hit the front of his head. Denies LOC. Also reports concerns for alcohol withdrawal as he has been through it and he is worries, reports last drink was 11am. Reports feeling shaky but denies other complaints. Denies fever, chills, vomiting, chest pain, shortness of breath, abdominal pain. CTH in ED 4/8: small sylvian fissure tSAH. Na 130, Plt 67, Coags wnl. 4 hour interval scan with worsening IPH. ICH Score 2. Midnight stability CTH 4/8: aggressive increase in hemorrhage volume with positive spot sign, very large L sided IPH with mass effect, midbrain involvement, IVH and hydrocephelus. Upon admission to ICU, emergently intubated for worsening mental status and airway protection. s/p platelet transfusions    CTH 4/7: Acute HARRY at L sylvian/frontotemporal convexity.  CTH: Interval development of large IPH centered in the L subinsular region, measuring ~8.6x3.1x5.2cm. New mass effect in the L cerebellar hemisphere, with effacement of the L lateral ventricle and ~5mm midline shift to the R. Adjacent SAH in the L sylvian fissure is grossly stable. Multiple large dental caries in the upper and lower teeth.  CERVICAL SPINE CT: No acute fracture or traumatic malalignment.  CTA NECK: The cervical vasculature is patent without evidence of stenosis or dissection. There is mild atherosclerotic calcification of the right carotid bifurcation/right carotid bulb.  No hemodynamically significant carotid stenosis using NASCET criteria.  CTA BRAIN: An irregular linear focus of contrast opacification within the acute parenchymal hemorrhage is compatible with active contrast extravasation. No major vessel occlusion, flow-limiting stenosis or aneurysm is identified about the [place]. No evidence of dural venous sinus thrombosis or an arteriovenous malformation.

## 2024-04-08 NOTE — CHART NOTE - NSCHARTNOTEFT_GEN_A_CORE
CAPRINI SCORE [CLOT] Score on Admission for     AGE RELATED RISK FACTORS                                                       MOBILITY RELATED FACTORS  [x] Age 41-60 years                                            (1 Point)                  [ ] Bed rest                                                        (1 Point)  [ ] Age: 61-74 years                                           (2 Points)                 [ ] Plaster cast                                                   (2 Points)  [ ] Age= 75 years                                              (3 Points)                 [ ] Bed bound for more than 72 hours                 (2 Points)    DISEASE RELATED RISK FACTORS                                               GENDER SPECIFIC FACTORS  [ ] Edema in the lower extremities                       (1 Point)                  [ ] Pregnancy                                                     (1 Point)  [ ] Varicose veins                                               (1 Point)                  [ ] Post-partum < 6 weeks                                   (1 Point)             [x] BMI > 25 Kg/m2                                            (1 Point)                  [ ] Hormonal therapy  or oral contraception          (1 Point)                 [ ] Sepsis (in the previous month)                        (1 Point)            [ ] History of pregnancy complications                 (1 point)  [ ] Pneumonia or serious lung disease                                            [ ] Unexplained or recurrent                     (1 Point)           (in the previous month)                               (1 Point)  [ ] Abnormal pulmonary function test                     (1 Point)                 SURGERY RELATED RISK FACTORS (include planned surgeries)  [ ] Acute myocardial infarction                              (1 Point)                 [ ]  Section                                             (1 Point)  [ ] Congestive heart failure (in the previous month)  (1 Point)         [ ] Minor surgery                                                  (1 Point)   [ ] Inflammatory bowel disease                             (1 Point)                 [ ] Arthroscopic surgery                                        (2 Points)  [ ] Central venous access                                      (2 Points)                 [ ] General surgery lasting more than 45 minutes   (2 Points)       [x] Stroke (in the previous month)                          (5 Points)               [ ] Elective arthroplasty                                         (5 Points)            [ ] current or past malignancy                              (2 Points)                                                                                                       HEMATOLOGY RELATED FACTORS                                                 TRAUMA RELATED RISK FACTORS  [ ] Prior episodes of VTE                                     (3 Points)                [ ] Fracture of the hip, pelvis, or leg                       (5 Points)  [ ] Positive family history for VTE                         (3 Points)             [ ] Acute spinal cord injury (in the previous month)  (5 Points)  [ ] Prothrombin 00410 A                                     (3 Points)                [ ] Paralysis  (less than 1 month)                             (5 Points)  [ ] Factor V Leiden                                             (3 Points)                  [ ] Multiple Trauma within 1 month                        (5 Points)  [ ] Lupus anticoagulants                                     (3 Points)                                                           [ ] Anticardiolipin antibodies                               (3 Points)                                                       [ ] High homocysteine in the blood                      (3 Points)                                             [ ] Other congenital or acquired thrombophilia      (3 Points)                                                [ ] Heparin induced thrombocytopenia                  (3 Points)                                          Total Score [     7     ]    Risk:  Very low 0   Low 1 to 2   Moderate 3 to 4   High =5       VTE Prophylaxis Recommendations:  [x] mechanical pneumatic compression devices                                      [ ] contraindicated: _____________________  [ ] chemo prophylaxis                                                                                  [x] contraindicated _____________________    **** HIGH LIKELIHOOD DVT PRESENT ON ADMISSION  [x] (please order LE dopplers within 24 hours of admission)

## 2024-04-08 NOTE — SPEECH LANGUAGE PATHOLOGY EVALUATION - H & P REVIEW
Jeronimo Lazaro is a 42 y/o male PMHx: ETOH. presents for head trauma and concerns for alcohol withdrawal s/p mechanical fall. Pt reports that he tripped, fell forward & hit the front of his head. Denies LOC. Also reports concerns for alcohol withdrawal as he has been through it and he is worries, reports last drink was 11am. Reports feeling shaky but denies other complaints. CTH in ED 4/8: small sylvian fissure tSAH. Na 130, Plt 67, Coags wnl. 4 hour interval scan with worsening IPH. ICH Score 2. Midnight stability CTH 4/8: aggressive increase in hemorrhage volume with positive spot sign, very large L sided IPH with mass effect, midbrain involvement, IVH and hydrocephelus. Upon admission to ICU, emergently intubated for worsening mental status and airway protection. s/p platelet transfusions/yes

## 2024-04-08 NOTE — PROGRESS NOTE ADULT - SUBJECTIVE AND OBJECTIVE BOX
Patient is a 43 year-old-male with history of alcohol misuse disorder presents for head trauma and concerns for alcohol withdrawal. Patient reports that he tripped this morning and fell forward and hit the front of his head. Denies LOC. Also reports concerns for alcohol withdrawal as he has been through it and he is worries, reports last drink was 11am. Reports feeling shaky but denies other complaints. Denies fever, chills, vomiting, chest pain, shortness of breath, abdominal pain. CTH in ED w/ small sylvian fissure tSAH, 4 hour interval scan with worsening IPH    ICH Score 2    Review of Systems  Unable to participate 2/2 neuro injury    Allergies    No Known Allergies    Intolerances    ICU Vital Signs Last 24 Hrs  T(C): 36.9 (08 Apr 2024 00:45), Max: 36.9 (08 Apr 2024 00:34)  T(F): 98.5 (08 Apr 2024 00:45), Max: 98.5 (08 Apr 2024 00:34)  HR: 99 (08 Apr 2024 00:45) (98 - 133)  BP: 145/77 (08 Apr 2024 00:34) (115/80 - 145/77)  BP(mean): 87 (07 Apr 2024 19:32) (87 - 87)  ABP: --  ABP(mean): --  RR: 19 (08 Apr 2024 00:45) (14 - 20)  SpO2: 98% (08 Apr 2024 00:45) (96% - 100%)    O2 Parameters below as of 08 Apr 2024 00:45  Patient On (Oxygen Delivery Method): room air                          12.4   3.25  )-----------( 67       ( 07 Apr 2024 19:01 )             34.6   04-07    130<L>  |  85<L>  |  8   ----------------------------<  92  4.7   |  22  |  0.84    Ca    9.2      07 Apr 2024 19:01  Mg     1.7     04-07    TPro  8.2  /  Alb  4.8  /  TBili  2.0<H>  /  DBili  x   /  AST  342<H>  /  ALT  216<H>  /  AlkPhos  113  04-07    < from: CT Head No Cont (04.07.24 @ 20:17) >  INTERPRETATION:  CLINICAL INDICATION: Status post fall with frontal   hematoma    TECHNIQUE: Noncontrast CT of the head, and cervical spine is performed in   two separate helical CT acquisitions. Images displayed in bone and soft   tissue algorithm, and reviewed in sagittal and coronal reformatted planes.    COMPARISON: Head CT 01/25/2024.    FINDINGS:    HEAD CT:    There is left forehead contusive change/hematoma. No radiopaque foreign   body or gas collection.    Acute subarachnoid blood is present in the left at posterior sylvian and   lateral frontotemporal convexity. No parenchymal hemorrhage or infarct.    There is mild diffuse volume loss without hydrocephalus. No mass effect   or midline shift.    On bony window inspection, there is no calvarial fracture. The   tympanomastoid cavities and included paranasalsinuses are well-aerated.      CERVICAL SPINE CT:    Alignment: Lordosis is straightened but without spondylolisthesis, facet   subluxation or malalignment at the craniovertebral or atlantoaxial   articulations.    Vertebrae: No fracture or focal/aggressive bony lesion    Disc spaces: Preserved in height. No bony spinal stenosis or large   herniation. Neural foramina appear patent as well.    Soft tissues: No prevertebral edema.      IMPRESSION:    HEAD CT:    Acute subarachnoid hemorrhage at left sylvian/frontotemporal convexity.    Case discussed with Dr. Swift at 8:30 PM on 04/07/2024.      CERVICAL SPINE CT:    No acute fracture or traumatic malalignment.    --- End of Report ---    < end of copied text >    < from: CT Chest w/ IV Cont (04.08.24 @ 00:19) >    INTERPRETATION:  CLINICAL INFORMATION: Trauma.    COMPARISON: CTA chest from 9/29/2022. CT abdomen pelvis 9/6/2011    CONTRAST/COMPLICATIONS:  IV Contrast: Omnipaque 350 (accession [id number]), IV contrast documented   in unlinked concurrent exam (accession [id number])  90 cc administered   10   cc discarded  Oral Contrast: NONE  Complications: None reported at time of study completion    PROCEDURE:  CT of the Chest, Abdomen and Pelvis was performed.  Imaging was performed through the chest in the arterial phase followed by   imaging of the abdomen and pelvis in the portal venous phase.  Sagittal and coronal reformats were performed.    FINDINGS:  CHEST:  LUNGS AND LARGE AIRWAYS: Patent central airways. No pulmonary nodules or   parenchymal consolidation.  PLEURA: No pleural effusion.  VESSELS: Within normal limits.  HEART: Heart size is normal. No pericardial effusion.  MEDIASTINUM AND KAREN: No lymphadenopathy.  CHEST WALL AND LOWER NECK: Within normal limits.    ABDOMEN AND PELVIS:  LIVER: Steatosis.  BILE DUCTS: Normal caliber.  GALLBLADDER: Within normal limits.  SPLEEN: Within normal limits.  PANCREAS: Within normal limits.  ADRENALS: Within normal limits.  KIDNEYS/URETERS: Within normal limits.    BLADDER: Within normal limits.  REPRODUCTIVE ORGANS: Prostate within normal limits.    BOWEL: No bowel obstruction. Appendix is normal.  PERITONEUM: No ascites.  VESSELS: Within normal limits.  RETROPERITONEUM/LYMPH NODES: No lymphadenopathy.  ABDOMINAL WALL: Within normal limits.  BONES: Degenerative changes. Fractures of the left anterior sixth and   seventh ribs appear subacute    IMPRESSION:  No acute intrathoracic, abdominal, or pelvic findings.    Fractures of the left anterior sixth and seventh ribs appear subacute    --- End of Report ---    < end of copied text >    < from: CT Abdomen and Pelvis w/ IV Cont (04.08.24 @ 00:19) >  INTERPRETATION:  CLINICAL INFORMATION: Trauma.    COMPARISON: CTA chest from 9/29/2022. CT abdomen pelvis 9/6/2011    CONTRAST/COMPLICATIONS:  IV Contrast: Omnipaque 350 (accession [id number]), IV contrast documented   in unlinked concurrent exam (accession [id number])  90 cc administered   10   cc discarded  Oral Contrast: NONE  Complications: None reported at time of study completion    PROCEDURE:  CT of the Chest, Abdomen and Pelvis was performed.  Imaging was performed through the chest in the arterial phase followed by   imaging of the abdomen and pelvis in the portal venous phase.  Sagittal and coronal reformats were performed.    FINDINGS:  CHEST:  LUNGS AND LARGE AIRWAYS: Patent central airways. No pulmonary nodules or   parenchymal consolidation.  PLEURA: No pleural effusion.  VESSELS: Within normal limits.  HEART: Heart size is normal. No pericardial effusion.  MEDIASTINUM AND KAREN: No lymphadenopathy.  CHEST WALL AND LOWER NECK: Within normal limits.    ABDOMEN AND PELVIS:  LIVER: Steatosis.  BILE DUCTS: Normal caliber.  GALLBLADDER: Within normal limits.  SPLEEN: Within normal limits.  PANCREAS: Within normal limits.  ADRENALS: Within normal limits.  KIDNEYS/URETERS: Within normal limits.    BLADDER: Within normal limits.  REPRODUCTIVE ORGANS: Prostate within normal limits.    BOWEL: No bowel obstruction. Appendix is normal.  PERITONEUM: No ascites.  VESSELS: Within normal limits.  RETROPERITONEUM/LYMPH NODES: No lymphadenopathy.  ABDOMINAL WALL: Within normal limits.  BONES: Degenerative changes. Fractures of the left anterior sixth and   seventh ribs appear subacute    IMPRESSION:  No acute intrathoracic, abdominal, or pelvic findings.    Fractures of the left anterior sixth and seventh ribs appear subacute    --- End of Report ---    < end of copied text >    Physical Exam:  Neuro: Alert, aphasic, unable to follow commands, PERRL, mimics, R side AG, LLE TF, LUE 0/5  Resp: CTA BL  CV: +s1s2, rrr  Abd: soft ntndx4     Patient is a 43 year-old-male with history of alcohol misuse disorder presents for head trauma and concerns for alcohol withdrawal. Patient reports that he tripped this morning and fell forward and hit the front of his head. Denies LOC. Also reports concerns for alcohol withdrawal as he has been through it and he is worries, reports last drink was 11am. Reports feeling shaky but denies other complaints. Denies fever, chills, vomiting, chest pain, shortness of breath, abdominal pain. CTH in ED w/ small sylvian fissure tSAH, 4 hour interval scan with worsening IPH    ICH Score 2    Review of Systems  Unable to participate 2/2 neuro injury    Allergies    No Known Allergies    Intolerances    ICU Vital Signs Last 24 Hrs  T(C): 36.9 (08 Apr 2024 00:45), Max: 36.9 (08 Apr 2024 00:34)  T(F): 98.5 (08 Apr 2024 00:45), Max: 98.5 (08 Apr 2024 00:34)  HR: 99 (08 Apr 2024 00:45) (98 - 133)  BP: 145/77 (08 Apr 2024 00:34) (115/80 - 145/77)  BP(mean): 87 (07 Apr 2024 19:32) (87 - 87)  ABP: --  ABP(mean): --  RR: 19 (08 Apr 2024 00:45) (14 - 20)  SpO2: 98% (08 Apr 2024 00:45) (96% - 100%)    O2 Parameters below as of 08 Apr 2024 00:45  Patient On (Oxygen Delivery Method): room air                          12.4   3.25  )-----------( 67       ( 07 Apr 2024 19:01 )             34.6   04-07    130<L>  |  85<L>  |  8   ----------------------------<  92  4.7   |  22  |  0.84    Ca    9.2      07 Apr 2024 19:01  Mg     1.7     04-07    TPro  8.2  /  Alb  4.8  /  TBili  2.0<H>  /  DBili  x   /  AST  342<H>  /  ALT  216<H>  /  AlkPhos  113  04-07    < from: CT Head No Cont (04.07.24 @ 20:17) >  INTERPRETATION:  CLINICAL INDICATION: Status post fall with frontal   hematoma    TECHNIQUE: Noncontrast CT of the head, and cervical spine is performed in   two separate helical CT acquisitions. Images displayed in bone and soft   tissue algorithm, and reviewed in sagittal and coronal reformatted planes.    COMPARISON: Head CT 01/25/2024.    FINDINGS:    HEAD CT:    There is left forehead contusive change/hematoma. No radiopaque foreign   body or gas collection.    Acute subarachnoid blood is present in the left at posterior sylvian and   lateral frontotemporal convexity. No parenchymal hemorrhage or infarct.    There is mild diffuse volume loss without hydrocephalus. No mass effect   or midline shift.    On bony window inspection, there is no calvarial fracture. The   tympanomastoid cavities and included paranasalsinuses are well-aerated.      CERVICAL SPINE CT:    Alignment: Lordosis is straightened but without spondylolisthesis, facet   subluxation or malalignment at the craniovertebral or atlantoaxial   articulations.    Vertebrae: No fracture or focal/aggressive bony lesion    Disc spaces: Preserved in height. No bony spinal stenosis or large   herniation. Neural foramina appear patent as well.    Soft tissues: No prevertebral edema.      IMPRESSION:    HEAD CT:    Acute subarachnoid hemorrhage at left sylvian/frontotemporal convexity.    Case discussed with Dr. Swift at 8:30 PM on 04/07/2024.      CERVICAL SPINE CT:    No acute fracture or traumatic malalignment.    --- End of Report ---    < end of copied text >    < from: CT Chest w/ IV Cont (04.08.24 @ 00:19) >    INTERPRETATION:  CLINICAL INFORMATION: Trauma.    COMPARISON: CTA chest from 9/29/2022. CT abdomen pelvis 9/6/2011    CONTRAST/COMPLICATIONS:  IV Contrast: Omnipaque 350 (accession [id number]), IV contrast documented   in unlinked concurrent exam (accession [id number])  90 cc administered   10   cc discarded  Oral Contrast: NONE  Complications: None reported at time of study completion    PROCEDURE:  CT of the Chest, Abdomen and Pelvis was performed.  Imaging was performed through the chest in the arterial phase followed by   imaging of the abdomen and pelvis in the portal venous phase.  Sagittal and coronal reformats were performed.    FINDINGS:  CHEST:  LUNGS AND LARGE AIRWAYS: Patent central airways. No pulmonary nodules or   parenchymal consolidation.  PLEURA: No pleural effusion.  VESSELS: Within normal limits.  HEART: Heart size is normal. No pericardial effusion.  MEDIASTINUM AND KAREN: No lymphadenopathy.  CHEST WALL AND LOWER NECK: Within normal limits.    ABDOMEN AND PELVIS:  LIVER: Steatosis.  BILE DUCTS: Normal caliber.  GALLBLADDER: Within normal limits.  SPLEEN: Within normal limits.  PANCREAS: Within normal limits.  ADRENALS: Within normal limits.  KIDNEYS/URETERS: Within normal limits.    BLADDER: Within normal limits.  REPRODUCTIVE ORGANS: Prostate within normal limits.    BOWEL: No bowel obstruction. Appendix is normal.  PERITONEUM: No ascites.  VESSELS: Within normal limits.  RETROPERITONEUM/LYMPH NODES: No lymphadenopathy.  ABDOMINAL WALL: Within normal limits.  BONES: Degenerative changes. Fractures of the left anterior sixth and   seventh ribs appear subacute    IMPRESSION:  No acute intrathoracic, abdominal, or pelvic findings.    Fractures of the left anterior sixth and seventh ribs appear subacute    --- End of Report ---    < end of copied text >    < from: CT Abdomen and Pelvis w/ IV Cont (04.08.24 @ 00:19) >  INTERPRETATION:  CLINICAL INFORMATION: Trauma.    COMPARISON: CTA chest from 9/29/2022. CT abdomen pelvis 9/6/2011    CONTRAST/COMPLICATIONS:  IV Contrast: Omnipaque 350 (accession [id number]), IV contrast documented   in unlinked concurrent exam (accession [id number])  90 cc administered   10   cc discarded  Oral Contrast: NONE  Complications: None reported at time of study completion    PROCEDURE:  CT of the Chest, Abdomen and Pelvis was performed.  Imaging was performed through the chest in the arterial phase followed by   imaging of the abdomen and pelvis in the portal venous phase.  Sagittal and coronal reformats were performed.    FINDINGS:  CHEST:  LUNGS AND LARGE AIRWAYS: Patent central airways. No pulmonary nodules or   parenchymal consolidation.  PLEURA: No pleural effusion.  VESSELS: Within normal limits.  HEART: Heart size is normal. No pericardial effusion.  MEDIASTINUM AND KAREN: No lymphadenopathy.  CHEST WALL AND LOWER NECK: Within normal limits.    ABDOMEN AND PELVIS:  LIVER: Steatosis.  BILE DUCTS: Normal caliber.  GALLBLADDER: Within normal limits.  SPLEEN: Within normal limits.  PANCREAS: Within normal limits.  ADRENALS: Within normal limits.  KIDNEYS/URETERS: Within normal limits.    BLADDER: Within normal limits.  REPRODUCTIVE ORGANS: Prostate within normal limits.    BOWEL: No bowel obstruction. Appendix is normal.  PERITONEUM: No ascites.  VESSELS: Within normal limits.  RETROPERITONEUM/LYMPH NODES: No lymphadenopathy.  ABDOMINAL WALL: Within normal limits.  BONES: Degenerative changes. Fractures of the left anterior sixth and   seventh ribs appear subacute    IMPRESSION:  No acute intrathoracic, abdominal, or pelvic findings.    Fractures of the left anterior sixth and seventh ribs appear subacute    --- End of Report ---    < end of copied text >    Physical Exam:  Neuro: No EO, Pupils 3 mm NR, + cough/gag/OB RUE localizing, LUE localizing, B/L LE TF  Resp: CTA BL  CV: +s1s2, rrr  Abd: soft ntndx4

## 2024-04-08 NOTE — AIRWAY PLACEMENT NOTE ADULT - AIRWAY COMMENTS:
Patient was emergently intubated by GEORGE Brooks. No complications occurred. Patient was placed on ventilator.

## 2024-04-08 NOTE — SPEECH LANGUAGE PATHOLOGY EVALUATION - SLP DIAGNOSIS
Order received/appreciated for language/speech evaluation; Patient remains intubated and not appropriate for intervention at this time given current medical course as listed above. Sarai Padilla MS CCC-SLP Prefer teams   extension [phone]

## 2024-04-08 NOTE — PROCEDURE NOTE - NSPOSTPRCRAD_GEN_A_CORE
central line located in the/central line located in the superior vena cava/depth of insertion/line adjusted to depth of insertion/no pneumothorax/post procedure radiography not performed

## 2024-04-08 NOTE — PROGRESS NOTE ADULT - ASSESSMENT
Patient is a 43 year-old-male with history of alcohol misuse disorder presents for head trauma and concerns for alcohol withdrawal. Patient reports that he tripped this morning and fell forward and hit the front of his head. Denies LOC. Also reports concerns for alcohol withdrawal as he has been through it and he is worries, reports last drink was 11am. Reports feeling shaky but denies other complaints. Denies fever, chills, vomiting, chest pain, shortness of breath, abdominal pain. CTH in ED w/ small sylvian fissure tSAH, 4 hour interval scan with worsening IPH.    ICH Score 2    -----NEURO----  #IPH  - q1 hour neuro checks  - initial CTH w/ L sylvian fissure tSAH, 4 hour interval with worsening L sided IPH  - 4 hour interval scan for interval stability  - Keppra 500 BID sz ppx    #ETOH WD  - hx of ETOH dependence w/ withdrawl  - ETOH in   - phenobarb 64.8 IVP q6  - CIWA trigged Ativan PRN    #Pain  multimodal pan management tylenol oxycodone    ----RESP----  -on RA  -IS    ----CV----  --180 in setting of TBI  -ANNEMARIE pending, f/u    ----GI----  -NPO given airway concerns  -will place NGT  -LBM PTA  -bowel regimen miralax/senna    --------  #Hyponatremia  - on presentation  -poor solute intake vs SIADH, f/u urine NA and osmolality  -3% @ 30cc/hr for cerebral edema, do not replace NA by more than 8-12mmol in 24hours  -q6 hr bmp while on HTS  -I&O    ----HEME----  #Thrombocytopenia  -likely myelosupressive in setting of ETOH abuse  -s/p 2 units PLTs in ED, f/u CBC    #VTE PPX  -SCDs BL  -hold chemical ppx given acute IPH    ----ENDO----  -f/u A1c, TSH  -maintain euglycemia 120-180    ----ID----  -afebrile    To be maintained in neuro ICU given risk for acute decompensation in setting of traumatic IPH

## 2024-04-08 NOTE — PROCEDURE NOTE - NSINDICATIONS_GEN_A_CORE
critical illness/emergency venous access/hypertonic/irritant infusion/venous access
arterial puncture to obtain ABG's/blood sampling/critical patient/monitoring purposes

## 2024-04-08 NOTE — PROGRESS NOTE ADULT - SUBJECTIVE AND OBJECTIVE BOX
EVENING ATTENDING PROGRESS SNOTE    43M alcohol misuse disorder pw TBI and alcohol withdrawal. CTH in ED w/ small sylvian fissure tSAH, 4 hour interval scan with worsening IPH x2    Exam:  Neuro:  No EO  Pupils 3 mm NR  + cough/gag/OB UE WD, B/L LE TF    Resp: CTA BL  CV: +s1s2, rrr  Abd: soft ntndx4    Labs, Vitals, Imaging Reviewed
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
## 2024-04-08 NOTE — CHART NOTE - NSCHARTNOTEFT_GEN_A_CORE
The patient had a decline in exam; afterwhich, an emergent CTH was obtained. CTH now with very large L sided IPH with mass effect, midbrain involvement, IVH and hydrocephelus. Potential interventions were discussed; however, given the patient's minimal response to platelets, the size and severity of the bleed that any neurosurgical intervention would be futile at best, or potentially hasten the patient's demise. As such surgical intervention was deferred, in discussion with the [facility] hypertonics, additional platelets and TXA were given. Family was called multiple times but did no answer, will update when able.

## 2024-04-08 NOTE — CHART NOTE - NSCHARTNOTEFT_GEN_A_CORE
Appreciate [facility] fellow goals of care  discussion held  with patients mother Amelie today.    MOLST completed, DNR established.  I spoke to surrogate / mom Amelie by phone and introduced myself and role of palliative care.  Appreciative of the call, we will touch base again for further discussions   Will continue to follow alongside [facility] team.

## 2024-04-09 NOTE — PROCEDURE NOTE - NSBRONCHPROCDETAILS_GEN_A_CORE_FT
Patient intubated for airway control secondary to acute intracranial hemorrhage. Pt. given 100mcg fentanyl prior to bronchoscopy. Catheter introduced without complication to level of ginger, patient given local lidocaine for suppression of cough reflex. All lobes evaluated without complications. Bronchioalveolar lavage done for sample collection.

## 2024-04-09 NOTE — PROVIDER CONTACT NOTE (OTHER) - ASSESSMENT
Patient localizes upper extremities bilateral. LLE no movement and RLE triple flexion. Right pupil 3mm round and fixed and left pupil 6mm round and fixed

## 2024-04-09 NOTE — PROGRESS NOTE ADULT - SUBJECTIVE AND OBJECTIVE BOX
Patient seen and examined at bedside.    --Anticoagulation--    T(C): 37.8 (04-08-24 @ 23:00), Max: 39.1 (04-08-24 @ 08:00)  HR: 86 (04-09-24 @ 01:00) (81 - 137)  BP: 161/84 (04-08-24 @ 08:00) (144/92 - 194/123)  RR: 17 (04-09-24 @ 01:00) (14 - 25)  SpO2: 100% (04-09-24 @ 01:00) (98% - 100%)  Wt(kg): --    Exam: Intubated, no EO, Pupils 3 mm NR, + cough/gag/OB, no corneals, B/L UE localizing, RLE TF, LLE 0/5

## 2024-04-09 NOTE — PROGRESS NOTE ADULT - SUBJECTIVE AND OBJECTIVE BOX
Patient is a 43 year-old-male with history of alcohol misuse disorder presents for head trauma and concerns for alcohol withdrawal. Patient reports that he tripped this morning and fell forward and hit the front of his head. Denies LOC. Also reports concerns for alcohol withdrawal as he has been through it and he is worries, reports last drink was 11am. Reports feeling shaky but denies other complaints. Denies fever, chills, vomiting, chest pain, shortness of breath, abdominal pain. CTH in ED w/ small sylvian fissure tSAH, 4 hour interval scan with worsening IPH    ICH Score 2    Review of Systems  Unable to participate 2/2 neuro injury    Allergies    No Known Allergies    Intolerances    ICU Vital Signs Last 24 Hrs  T(C): 36.9 (08 Apr 2024 00:45), Max: 36.9 (08 Apr 2024 00:34)  T(F): 98.5 (08 Apr 2024 00:45), Max: 98.5 (08 Apr 2024 00:34)  HR: 99 (08 Apr 2024 00:45) (98 - 133)  BP: 145/77 (08 Apr 2024 00:34) (115/80 - 145/77)  BP(mean): 87 (07 Apr 2024 19:32) (87 - 87)  ABP: --  ABP(mean): --  RR: 19 (08 Apr 2024 00:45) (14 - 20)  SpO2: 98% (08 Apr 2024 00:45) (96% - 100%)    O2 Parameters below as of 08 Apr 2024 00:45  Patient On (Oxygen Delivery Method): room air                          12.4   3.25  )-----------( 67       ( 07 Apr 2024 19:01 )             34.6   04-07    130<L>  |  85<L>  |  8   ----------------------------<  92  4.7   |  22  |  0.84    Ca    9.2      07 Apr 2024 19:01  Mg     1.7     04-07    TPro  8.2  /  Alb  4.8  /  TBili  2.0<H>  /  DBili  x   /  AST  342<H>  /  ALT  216<H>  /  AlkPhos  113  04-07    < from: CT Head No Cont (04.07.24 @ 20:17) >  INTERPRETATION:  CLINICAL INDICATION: Status post fall with frontal   hematoma    TECHNIQUE: Noncontrast CT of the head, and cervical spine is performed in   two separate helical CT acquisitions. Images displayed in bone and soft   tissue algorithm, and reviewed in sagittal and coronal reformatted planes.    COMPARISON: Head CT 01/25/2024.    FINDINGS:    HEAD CT:    There is left forehead contusive change/hematoma. No radiopaque foreign   body or gas collection.    Acute subarachnoid blood is present in the left at posterior sylvian and   lateral frontotemporal convexity. No parenchymal hemorrhage or infarct.    There is mild diffuse volume loss without hydrocephalus. No mass effect   or midline shift.    On bony window inspection, there is no calvarial fracture. The   tympanomastoid cavities and included paranasalsinuses are well-aerated.      CERVICAL SPINE CT:    Alignment: Lordosis is straightened but without spondylolisthesis, facet   subluxation or malalignment at the craniovertebral or atlantoaxial   articulations.    Vertebrae: No fracture or focal/aggressive bony lesion    Disc spaces: Preserved in height. No bony spinal stenosis or large   herniation. Neural foramina appear patent as well.    Soft tissues: No prevertebral edema.      IMPRESSION:    HEAD CT:    Acute subarachnoid hemorrhage at left sylvian/frontotemporal convexity.    Case discussed with Dr. Swift at 8:30 PM on 04/07/2024.      CERVICAL SPINE CT:    No acute fracture or traumatic malalignment.    --- End of Report ---    < end of copied text >    < from: CT Chest w/ IV Cont (04.08.24 @ 00:19) >    INTERPRETATION:  CLINICAL INFORMATION: Trauma.    COMPARISON: [facility] chest from 9/29/2022. CT abdomen pelvis 9/6/2011    CONTRAST/COMPLICATIONS:  IV Contrast: Omnipaque 350 (accession [id number]), IV contrast documented   in unlinked concurrent exam (accession [id number])  90 cc administered   10   cc discarded  Oral Contrast: NONE  Complications: None reported at time of study completion    PROCEDURE:  CT of the Chest, Abdomen and Pelvis was performed.  Imaging was performed through the chest in the arterial phase followed by   imaging of the abdomen and pelvis in the portal venous phase.  Sagittal and coronal reformats were performed.    FINDINGS:  CHEST:  LUNGS AND LARGE AIRWAYS: Patent central airways. No pulmonary nodules or   parenchymal consolidation.  PLEURA: No pleural effusion.  VESSELS: Within normal limits.  HEART: Heart size is normal. No pericardial effusion.  MEDIASTINUM AND KAREN: No lymphadenopathy.  CHEST WALL AND LOWER NECK: Within normal limits.    ABDOMEN AND PELVIS:  LIVER: Steatosis.  BILE DUCTS: Normal caliber.  GALLBLADDER: Within normal limits.  SPLEEN: Within normal limits.  PANCREAS: Within normal limits.  ADRENALS: Within normal limits.  KIDNEYS/URETERS: Within normal limits.    BLADDER: Within normal limits.  REPRODUCTIVE ORGANS: Prostate within normal limits.    BOWEL: No bowel obstruction. Appendix is normal.  PERITONEUM: No ascites.  VESSELS: Within normal limits.  RETROPERITONEUM/LYMPH NODES: No lymphadenopathy.  ABDOMINAL WALL: Within normal limits.  BONES: Degenerative changes. Fractures of the left anterior sixth and   seventh ribs appear subacute    IMPRESSION:  No acute intrathoracic, abdominal, or pelvic findings.    Fractures of the left anterior sixth and seventh ribs appear subacute    --- End of Report ---    < end of copied text >    < from: CT Abdomen and Pelvis w/ IV Cont (04.08.24 @ 00:19) >  INTERPRETATION:  CLINICAL INFORMATION: Trauma.    COMPARISON: [facility] chest from 9/29/2022. CT abdomen pelvis 9/6/2011    CONTRAST/COMPLICATIONS:  IV Contrast: Omnipaque 350 (accession [id number]), IV contrast documented   in unlinked concurrent exam (accession [id number])  90 cc administered   10   cc discarded  Oral Contrast: NONE  Complications: None reported at time of study completion    PROCEDURE:  CT of the Chest, Abdomen and Pelvis was performed.  Imaging was performed through the chest in the arterial phase followed by   imaging of the abdomen and pelvis in the portal venous phase.  Sagittal and coronal reformats were performed.    FINDINGS:  CHEST:  LUNGS AND LARGE AIRWAYS: Patent central airways. No pulmonary nodules or   parenchymal consolidation.  PLEURA: No pleural effusion.  VESSELS: Within normal limits.  HEART: Heart size is normal. No pericardial effusion.  MEDIASTINUM AND KAREN: No lymphadenopathy.  CHEST WALL AND LOWER NECK: Within normal limits.    ABDOMEN AND PELVIS:  LIVER: Steatosis.  BILE DUCTS: Normal caliber.  GALLBLADDER: Within normal limits.  SPLEEN: Within normal limits.  PANCREAS: Within normal limits.  ADRENALS: Within normal limits.  KIDNEYS/URETERS: Within normal limits.    BLADDER: Within normal limits.  REPRODUCTIVE ORGANS: Prostate within normal limits.    BOWEL: No bowel obstruction. Appendix is normal.  PERITONEUM: No ascites.  VESSELS: Within normal limits.  RETROPERITONEUM/LYMPH NODES: No lymphadenopathy.  ABDOMINAL WALL: Within normal limits.  BONES: Degenerative changes. Fractures of the left anterior sixth and   seventh ribs appear subacute    IMPRESSION:  No acute intrathoracic, abdominal, or pelvic findings.    Fractures of the left anterior sixth and seventh ribs appear subacute    --- End of Report ---    < end of copied text >    Physical Exam:  Neuro: No EO, Pupils 3 mm NR, + cough/gag/OB RUE localizing, LUE localizing, B/L LE TF  Resp: CTA BL  CV: +s1s2, rrr  Abd: soft ntndx4

## 2024-04-09 NOTE — CONSULT NOTE ADULT - SUBJECTIVE AND OBJECTIVE BOX
p (1480)     HPI: Jeronimo Lazaro  43M Hx EtOH p/f Withdrawal and mech fall this AM. Na 130, Plt 67, Coags wnl. CTH w/small temporal contusion/L sylvian SAH, no fx  Exam: AOx3, PERRL, EOMI, no facial, no drift, course intention tremor, HINES 5/5    =====================  PAST MEDICAL HISTORY   Alcohol Withdrawal    Alcohol-Induced Pancreatitis    ETOH abuse      PAST SURGICAL HISTORY   No significant past surgical history      No Known Allergies      MEDICATIONS:  Antibiotics:    Neuro:    Other:  desmopressin IVPB 34 MICROGram(s) IV Intermittent Once      SOCIAL HISTORY:   Occupation:   Marital Status:     FAMILY HISTORY:  FH: lung cancer (Grandparent)        ROS: Negative except per HPI    LABS:  PT/INR - ( 07 Apr 2024 19:01 )   PT: 9.8 sec;   INR: 0.89 ratio         PTT - ( 07 Apr 2024 19:01 )  PTT:30.0 sec                        12.4   3.25  )-----------( 67       ( 07 Apr 2024 19:01 )             34.6     04-07    130<L>  |  85<L>  |  8   ----------------------------<  92  4.7   |  22  |  0.84    Ca    9.2      07 Apr 2024 19:01  Mg     1.7     04-07    TPro  8.2  /  Alb  4.8  /  TBili  2.0<H>  /  DBili  x   /  AST  342<H>  /  ALT  216<H>  /  AlkPhos  113  04-07      
HPI:\    Patient is a 43 year-old-male with history of alcohol misuse disorder presents for head trauma and concerns for alcohol withdrawal. Patient reports that he tripped this morning and fell forward and hit the front of his head. Denies LOC. Also reports concerns for alcohol withdrawal as he has been through it and he is worries, reports last drink was 11am. Reports feeling shaky but denies other complaints. Denies fever, chills, vomiting, chest pain, shortness of breath, abdominal pain. CTH in ED w/ small sylvian fissure tSAH, 4 hour interval scan with worsening IPH      PERTINENT PM/SXH:   Alcohol Withdrawal    Alcohol-Induced Pancreatitis    ETOH abuse      No significant past surgical history      FAMILY HISTORY:  FH: lung cancer (Grandparent)      Family Hx substance abuse [ ]yes [ ]no  ITEMS NOT CHECKED ARE NOT PRESENT    SOCIAL HISTORY:   Significant other/partner[ ]  Children[ ]  Denominational/Spirituality:  Substance hx:  [ ]   Tobacco hx:  [ ]   Alcohol hx: [ ]   Home Opioid hx:  [ ] I-Stop Reference No:  Living Situation: [ x]Home  [ ]Long term care  [ ]Rehab [ ]Other    ADVANCE DIRECTIVES:    DNR/MOLST  [x ]  Living Will  [ ]   DECISION MAKER(s):  [ ] Health Care Proxy(s)  [x ] Surrogate(s)  [ ] Guardian           Name(s): Phone Number(s):  aMrtín Kinsey   BASELINE (I)ADL(s) (prior to admission):  [place]: [x ]Total  [ ] Moderate [ ]Dependent    Allergies    No Known Allergies    Intolerances    MEDICATIONS  (STANDING):  chlorhexidine 0.12% Liquid 15 milliLiter(s) Oral Mucosa every 12 hours  chlorhexidine 4% Liquid 1 Application(s) Topical daily  dexMEDEtomidine Infusion 0.2 MICROgram(s)/kG/Hr (4.2 mL/Hr) IV Continuous <Continuous>  dextrose 50% Injectable 25 milliLiter(s) IV Push once  folic acid 1 milliGRAM(s) Oral daily  levETIRAcetam  IVPB 750 milliGRAM(s) IV Intermittent two times a day  multivitamin 1 Tablet(s) Oral daily  PHENobarbital Injectable 80 milliGRAM(s) IV Push three times a day  polyethylene glycol 3350 17 Gram(s) Oral two times a day  potassium chloride   Solution 20 milliEquivalent(s) Oral every 2 hours  potassium chloride  10 mEq/100 mL IVPB 10 milliEquivalent(s) IV Intermittent every 1 hour  senna 2 Tablet(s) Oral at bedtime  sodium chloride 3% + sodium acetate 50:50 1000 milliLiter(s) (30 mL/Hr) IV Continuous <Continuous>  thiamine 100 milliGRAM(s) Oral daily    MEDICATIONS  (PRN):  acetaminophen     Tablet .. 650 milliGRAM(s) Oral every 6 hours PRN Temp greater or equal to 38C (100.4F), Mild Pain (1 - 3)  oxyCODONE    IR 5 milliGRAM(s) Oral every 4 hours PRN Moderate Pain (4 - 6)  oxyCODONE    IR 10 milliGRAM(s) Oral every 4 hours PRN Severe Pain (7 - 10)  sodium chloride 0.9% lock flush 10 milliLiter(s) IV Push every 1 hour PRN Pre/post blood products, medications, blood draw, and to maintain line patency    PRESENT SYMPTOMS: [x ]Unable to self-report  [ ] CPOT [ ] PAINADs [ ] RDOS  Source if other than patient:  [ ]Family   [ ]Team     Pain: [ ]yes [ ]no  QOL impact -   Location -                    Aggravating factors -  Quality -  Radiation -  Timing-  Severity (0-10 scale):  Minimal acceptable level (0-10 scale):     CPOT:  0  https://www.s[facility].org/getattachment/zfi97o03-0m1z-3m6f-9v0y-8255d4670c9z/Critical-Care-Pain-Observation-Tool-(CPOT)    PAIN AD Score:   http://geriatrictoolkit.[place].[facility]/cog/painad.pdf (press ctrl +  left click to view)    Dyspnea:                           [ ]Mild [ ]Moderate [ ]Severe      RDOS:  0 to 2  minimal or no respiratory distress   3  mild distress  4 to 6 moderate distress  >7 severe distress  https://homecareinformation.net/handouts/hen/Respiratory_Distress_Observation_Scale.pdf (Ctrl +  left click to view)     Anxiety:                             [ ]Mild [ ]Moderate [ ]Severe  Fatigue:                             [ ]Mild [ ]Moderate [x ]Severe  Nausea:                             [ ]Mild [ ]Moderate [ ]Severe  Loss of appetite:              [ ]Mild [ ]Moderate [x ]Severe  Constipation:                    [ ]Mild [ ]Moderate [ ]Severe    PCSSQ [Palliative Care Spiritual Screening Question]   Severity (0-10): 3  Score of 4 or > indicate consideration of Chaplaincy referral.  Chaplaincy Referral: [ ] yes [ ] refused [ ] following  xx deferred    Caregiver [place]? : [ x] yes [ ] no  Social work referral [ ] Patient & Family Centered Care Referral [ ]     Anticipatory Grief Present?: x[ ] yes [ ] no  Social work referral [ ]  Patient & Family Centered Care Referral [ ]       Other Symptoms:  x[ ]All other review of systems negative     Palliative Performance Status Version 2: 20        %    http://[facility].org/files/news/palliative_performance_scale_ppsv2.pdf  PHYSICAL EXAM:  Vital Signs Last 24 Hrs  T(C): 37 (09 Apr 2024 14:00), Max: 38 (09 Apr 2024 07:00)  T(F): 98.6 (09 Apr 2024 14:00), Max: 100.4 (09 Apr 2024 07:00)  HR: 71 (09 Apr 2024 15:35) (66 - 93)  BP: --  BP(mean): --  RR: 16 (09 Apr 2024 14:00) (16 - 25)  SpO2: 97% (09 Apr 2024 15:35) (97% - 100%)    Parameters below as of 09 Apr 2024 07:00  Patient On (Oxygen Delivery Method): ventilator    O2 Concentration (%): 30 I&O's Summary    08 Apr 2024 07:01  -  09 Apr 2024 07:00  --------------------------------------------------------  IN: 3364.8 mL / OUT: 5950 mL / NET: -2585.2 mL    09 Apr 2024 07:01  -  09 Apr 2024 16:11  --------------------------------------------------------  IN: 297.3 mL / OUT: 1750 mL / NET: -1452.7 mL      GENERAL: [ ]Cachexia    [ ]Alert  [ ]Oriented x   [ ]Lethargic  [ ]Unarousable  [ ]Verbal  [x ]Non-Verbal  Behavioral:   [ ] Anxiety  [ ] Delirium [ ] Agitation [x ] Other  HEENT:  [ ]Normal   [ ]Dry mouth   [ x]ET Tube/Trach  [ ]Oral lesions  PULMONARY:   [x ]Clear [ ]Tachypnea  [ ]Audible excessive secretions   [ ]Rhonchi        [ ]Right [ ]Left [ ]Bilateral  [ ]Crackles        [ ]Right [ ]Left [ ]Bilateral  [ ]Wheezing     [ ]Right [ ]Left [ ]Bilateral  [ ]Diminished breath sounds [ ]right [ ]left [ ]bilateral  CARDIOVASCULAR:    [ ]Regular [ ]Irregular [ ]Tachy  [ ]Robert [ ]Murmur [ ]Other  GASTROINTESTINAL:  x[ x]Soft  [ ]Distended   [ x]+BS  [ ]Non tender [ ]Tender  [ ]Other [ ]PEG [ ]OGT/ NGT  Last BM:  GENITOURINARY:  [ ]Normal [ ] Incontinent   [ ]Oliguria/Anuria   [ x]Huang  MUSCULOSKELETAL:   [ ]Normal   [ ]Weakness  [x ]Bed/Wheelchair bound [ ]Edema  NEUROLOGIC:   [ ]No focal deficits  [ ]Cognitive impairment  [ ]Dysphagia [ ]Dysarthria [ ]Paresis [ x]Other   SKIN:   [ x]Normal  [ ]Rash  [ ]Other  [ ]Pressure ulcer(s)       Present on admission [ ]y [ ]n    CRITICAL CARE:  [ ] Shock Present  [ ]Septic [ ]Cardiogenic [ ]Neurologic [ ]Hypovolemic  [ ]  Vasopressors [ ]  Inotropes   [x ]Respiratory failure present [ ]Mechanical ventilation [ ]Non-invasive ventilatory support [ ]High flow  Mode: AC/ CMV (Assist Control/ Continuous Mandatory Ventilation), RR (machine): 16, TV (machine): 500, FiO2: 30, PEEP: 5, ITime: 0.9, MAP: 8, PIP: 16  [x ]Acute  [ ]Chronic [ ]Hypoxic  [ ]Hypercarbic [ ]Other  [ ]Other organ failure     LABS:                        9.5    3.09  )-----------( 66       ( 09 Apr 2024 05:11 )             28.1   04-09    138  |  91<L>  |  6<L>  ----------------------------<  183<H>  2.8<LL>   |  31  |  0.49<L>    Ca    8.8      09 Apr 2024 13:30  Phos  2.9     04-09  Mg     2.0     04-09    TPro  7.5  /  Alb  4.5  /  TBili  1.4<H>  /  DBili  x   /  AST  215<H>  /  ALT  152<H>  /  AlkPhos  96  04-08  PT/INR - ( 09 Apr 2024 05:12 )   PT: 9.1 sec;   INR: 0.86 ratio         PTT - ( 09 Apr 2024 05:12 )  PTT:26.6 sec    Urinalysis Basic - ( 09 Apr 2024 13:30 )    Color: x / Appearance: x / SG: x / pH: x  Gluc: 183 mg/dL / Ketone: x  / Bili: x / Urobili: x   Blood: x / Protein: x / Nitrite: x   Leuk Esterase: x / RBC: x / WBC x   Sq Epi: x / Non Sq Epi: x / Bacteria: x      RADIOLOGY & ADDITIONAL STUDIES:    < from: CT Head No Cont (04.08.24 @ 09:28) >  INTERPRETATION:  CLINICAL INDICATION: Follow-up hemorrhage    COMPARISON: CT head 4/8/2024 from 3:28 AM    TECHNIQUE: Portable axial noncontrast CT images of the head were obtained.    FINDINGS:    Evolving left hemispheric parenchymal hematoma measuring approximately   10.9 x 5.2 cm, overall not significantly changed in size from prior exam.   There is surrounding vasogenic edema mass effect leftlateral ventricle   which is completely effaced anteriorly there is approximately 2 cm of   left-to-right midline shift and subfalcine herniation, not significantly   changed. The basilar cisterns are similarly effaced. Left-sided superior   uncal herniation is also identified.    Redemonstrated hemorrhage in the third ventricle and layering in the   bilateral occipital horns, increased in density on the right. Previously   seen fourth ventricular hemorrhage is no longer visualized.  Scattered subarachnoid hemorrhage appears unchanged.    Left frontal soft tissue hematoma. No calvarial fracture is identified.   Small polyp/retention cyst in the left maxillary sinus. The paranasal   sinuses and tympanomastoid cavities are otherwise clear.    Partially visualized endotracheal tube.      IMPRESSION:    Evolving left hemispheric parenchymal hematoma with extension into the   ventricles not significantly changed in size from prior exam. Severe   left-to-right midline shift is unchanged.  Effacement of the basilar   cisterns.  Scattered subarachnoid hemorrhage appears unchanged.    These findings were discussed with GLADIS Moreira by Dr. Salguero at 8:43 AM on   4/8/2024, with repeat back confirmation.    --- End of Report ---    < end of copied text >      PROTEIN CALORIE MALNUTRITION PRESENT: [ ]mild [ ]moderate [ ]severe [ ]underweight [ ]morbid obesity  https://www.andeal.org/vault/2440/web/files/ONC/Table_Clinical%20Characteristics%20to%20Document%20Malnutrition-White%20JV%20et%20al%202012.pdf    Height (cm): 172.7 (04-07-24 @ 18:34), 180.3 (01-24-24 @ 23:06), 180.3 (12-12-23 @ 19:48)  Weight (kg): 83.9 (04-07-24 @ 18:34), 82.9 (12-12-23 @ 19:48), 77.111 (08-31-23 @ 05:12)  BMI (kg/m2): 28.1 (04-07-24 @ 18:34), 25.5 (01-24-24 @ 23:06), 25.5 (12-12-23 @ 19:48)    [ ]PPSV2 < or = to 30% [ ]significant weight loss  [ x]poor nutritional intake  [ ]anasarca[ ]Artificial Nutrition      Other REFERRALS:  [ ]Hospice  [ ]Child Life  [ x]Social Work  [ ]Case management [ ]Holistic Therapy

## 2024-04-09 NOTE — CONSULT NOTE ADULT - ASSESSMENT
Iveth, Jeronimo  43M Hx EtOH p/f Withdrawal and mech fall this AM. Na 130, Plt 67, Coags wnl. CTH w/small temporal contusion/L sylvian SAH, no fx  Exam: AOx3, PERRL, EOMI, no facial, no drift, course intention tremor, HINES 5/5  -2u plt, goal >100k, DDAVP 0.4 mcg/kg  -keppra load, 500bid x7d  -4h interval CTH, q1h neurochecks until read as stable, if stable ok for q4h neurochecks. Because of coagulopathy will need 24h CTH  -given suspicious pattern of SAH would rec CTA H/N  -SBP <160
44 y/o , ETOH abuse presents s/p fall hitting front of his head.  CTH with small sylvian fissure tSAH, repeat scan with worsening IPH and altered mental status .  Palliative care called for goals of care

## 2024-04-09 NOTE — CONSULT NOTE ADULT - PROBLEM SELECTOR RECOMMENDATION 5
Per discussion with ICU fellow,   compassionate extubation scheduled for tomorrow at 10.  Palliative care will follow up to evaluate for PCU transfer post extubation.

## 2024-04-09 NOTE — PROVIDER CONTACT NOTE (OTHER) - REASON
Appointment made with Annie 1/26.   
Patient is not feeling well and was told by urgent care to see PCP. Can we get her in with me, Yesika Johnson or Annie soon?  
Upon 11 am assessment left pupil 6mm round and fixed
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
## 2024-04-10 NOTE — DIETITIAN INITIAL EVALUATION ADULT - PERTINENT LABORATORY DATA
04-10    142  |  98  |  13  ----------------------------<  154<H>  2.6<LL>   |  29  |  0.61    Ca    9.4      10 Apr 2024 06:09  Phos  2.1     04-10  Mg     2.0     04-10    TPro  6.4  /  Alb  3.7  /  TBili  1.7<H>  /  DBili  0.7<H>  /  AST  64<H>  /  ALT  86<H>  /  AlkPhos  80  04-10  POCT Blood Glucose.: 174 mg/dL (04-10-24 @ 05:14)  A1C with Estimated Average Glucose Result: 4.5 % (04-08-24 @ 01:51)  A1C with Estimated Average Glucose Result: 5.0 % (12-12-23 @ 07:30)  A1C with Estimated Average Glucose Result: 4.9 % (09-01-23 @ 05:50)

## 2024-04-10 NOTE — DISCHARGE NOTE FOR THE EXPIRED PATIENT - NS PATIENT DEATH CRITERIA
PEA Arrest/Patient is dead based on Cardiopulmonary criteria including absent breath sounds, pulselessness and/or asystole Pulseless Electrical Activity/Patient is dead based on Cardiopulmonary criteria including absent breath sounds, pulselessness and/or asystole

## 2024-04-10 NOTE — PRE-ANESTHESIA EVALUATION ADULT - NSANTHPMHFT_GEN_ALL_CORE
43 year-old-male with history of alcohol misuse disorder presented for head trauma after fall; subsequently developed large intraparenchymal bleed. Intubated for acute change in mental status; overall poor prognosis determined. For organ donation.

## 2024-04-10 NOTE — DISCHARGE NOTE FOR THE EXPIRED PATIENT - HOSPITAL COURSE
43 year-old-male with history of alcohol misuse disorder presents for head trauma and concerns for alcohol withdrawal. Patient reports that he tripped this morning and fell forward and hit the front of his head. Denies LOC. Also reports concerns for alcohol withdrawal as he has been through it and he is worries, reports last drink was 11am. Reports feeling shaky but denies other complaints. Denies fever, chills, vomiting, chest pain, shortness of breath, abdominal pain. CTH in ED w/ small sylvian fissure tSAH, 4 hour interval scan with worsening IPH. ICH Score 2   43 year-old-male with history of alcohol misuse disorder presented to [facility] 4/8/2024 for head trauma and concerns for alcohol withdrawal. Patient reports that he tripped the morning of 4/8/2024 and fell forward and hit the front of his head. Denies LOC. Also reports concerns for alcohol withdrawal as he reports last drink was 11am 4/8/2024 and per patient has undergone withdrawal before. Reports feeling shaky but denies other complaints. Denies fever, chills, vomiting, chest pain, shortness of breath, abdominal pain. In ED, Left frontal hematoma was present with atraumatic scalp. CTH in ED showed small sylvian fissure traumatic subarachnoid hemorrhage. Exam at time of admission was alert and oriented to time, place and self, pupils were bilaterally equal and reactive, no facial droop, no drift, course intention tremor, moving all extremities strongly 5/5. Admitted to Neurosurgery ICU to close monitoring. Upon admission to the NSCU patient exam deteriorated Patient was emergently intubated for worsening mental status and airway protection. Exam at this time showed no eye opening to stimulation or voice, Pupils bilaterally 3 mm non-reactive, + cough/gag/overbreathing, Right upper extremity localizing, Left upper extremity localizing, bilateral lower extremities triple flexion. Emergent CT scan showed development of large intraparenchymal hemorrhage centered in the left subinsular region, measuring approximately 8.6 x 3.1 x 5.2 cm.  New mass effect in the left cerebellar hemisphere, with effacement of the left lateral ventricle and approximately 5 mm midline shift to the right.  Adjacent subarachnoid hemorrhage in the left sylvian fissure is grossly stable. Intracranial pressure management with hyperosmolar therapy. 2g TXA trauma protocol started given cirrhosis associated emergent ongoing life-threatening hematoma expansion. Due to platelet dysfunction secondary to ETOH, multiple units of platelets were given. No Neurosurgery intervention offered given poor surgical candidate with poor prognosis. The grave prognosis was discussed with family in details. On 4/8/24 NSCU held a goals of care conversation with Mother, Amelie Lazaro, in which a MOLST was completed and do not resuscitate established. On 4/9/24 Mother, Amelie Lazaro, consented and wished for patients organs to be donated. On 4/10/24 Patient was extubated at 1446. Pulseless Electrical Activity was observed at 1732 on 4/10/24. No pulse, no respirations observed, and no sounds on auscultation. Observation period for two minutes as per hospital policy.      Patient was pronounced dead at 1734 on 4/10/24 by Caroline Mary PA-C and Tracey Hodgson MD.   Neurointensivist Ronald Mayers MD was notified on 4/10/24 1736  Neurosurgery Isai Hargrove MD was notified on 4/10/24 1753 43 year-old-male with history of alcohol misuse disorder presented to [facility] Emergency Department (ED) 4/7/2024 PM for head trauma and concerns for alcohol withdrawal. Patient reports that he tripped the morning of 4/8/2024 and fell forward and hit the front of his head. Denies LOC. Also reports concerns for alcohol withdrawal as he reports last drink was 11am 4/7/2024 and per patient has undergone withdrawal before. Reports feeling shaky but denies other complaints. Denies fever, chills, vomiting, chest pain, shortness of breath, abdominal pain. In ED, Left frontal hematoma was present with atraumatic scalp. 4/724 PM CT of brain in ED showed cute subarachnoid hemorrhage at left sylvian/frontotemporal convexity. CTA of brain was negative, CT chest/abdomin/pelvis No acute intrathoracic, abdominal, or pelvic findings but showed fractures of the left anterior sixth and seventh ribs appear subacute. Exam in ED at time of admission was alert and oriented to time, place and self, pupils were bilaterally equal and reactive, no facial droop, no drift, course intention tremor, moving all extremities strongly 5/5. Admitted to Neurosurgery Intensive Care Unit (NSCU) 4/8/24 AM for close monitoring. Upon admission to the NSCU patient exam deteriorated. Patient was emergently intubated for worsening mental status and airway protection. Exam at this time showed no eye opening to stimulation or voice, Pupils bilaterally 3 mm non-reactive, + cough/gag/overbreathing, Right upper extremity localizing, Left upper extremity localizing, bilateral lower extremities triple flexion. Emergent CT of the brain showed development of large intraparenchymal hemorrhage centered in the left subinsular region, measuring approximately 8.6 x 3.1 x 5.2 cm.  New mass effect in the left cerebellar hemisphere, with effacement of the left lateral ventricle and approximately 5 mm midline shift to the right.  Adjacent subarachnoid hemorrhage in the left sylvian fissure is grossly stable. At this time, Intracranial pressure management with hyperosmolar therapy, hyperventilation and elevation was head was initiated. 2g TXA trauma protocol started given cirrhosis associated emergent ongoing life-threatening hematoma expansion in brain. Due to platelet dysfunction secondary to ETOH, multiple units of platelets were given. Per Neurosurgery potential interventions were discussed with Dr. Isai Hargrove; however, given the patient's minimal response to platelets, hyperosmolar therapy the size in brain hematoma and severity of the bleed that any neurosurgical intervention would be futile at best. No neurosurgical intervention offered given poor surgical candidate with poor prognosis. The grave prognosis was discussed with family in details with Mother, Amelie Lazaro. On 4/8/24 NSCU held a goals of care conversation with Mother, Amelie Lazaro, in which a MOLST was completed and do not resuscitate established. On 4/9/24 Mother, Amelie Lazaro, consented and wished for patients organs to be donated with [facility]. On 4/10/24 Patient was extubated at 1446. Pulseless Electrical Activity was observed at 1732 on 4/10/24. No pulse, no respirations observed, and no sounds on auscultation. Observation period for two minutes as per hospital policy.      Patient was pronounced dead at 1734 on 4/10/24 by Caroline Mary PA-C and Tracey Hodgson MD Fellow.   Neurointensivist Ronald Mayers MD was notified on 4/10/24 1736  Neurosurgery Isai Hargrove MD was notified on 4/10/24 0561 43 year-old-male with history of alcohol misuse disorder presented to [facility] Emergency Department (ED) 4/7/2024 PM for head trauma and concerns for alcohol withdrawal. Patient reports that he tripped the morning of 4/8/2024 and fell forward and hit the front of his head. Denies LOC. Also reports concerns for alcohol withdrawal as he reports last drink was 11am 4/7/2024 and per patient has undergone withdrawal before. Reports feeling shaky but denies other complaints. Denies fever, chills, vomiting, chest pain, shortness of breath, abdominal pain. In ED, Left frontal hematoma was present with atraumatic scalp. 4/724 PM CT of brain in ED showed cute subarachnoid hemorrhage at left sylvian/frontotemporal convexity. CTA of brain was negative, CT chest/abdomin/pelvis No acute intrathoracic, abdominal, or pelvic findings but showed fractures of the left anterior sixth and seventh ribs appear subacute. Exam in ED at time of admission was alert and oriented to time, place and self, pupils were bilaterally equal and reactive, no facial droop, no drift, course intention tremor, moving all extremities strongly 5/5. Admitted to Neurosurgery Intensive Care Unit (NSCU) 4/8/24 AM for close monitoring. Upon admission to the NSCU patient exam deteriorated. Patient was emergently intubated for worsening mental status and airway protection. Exam at this time showed no eye opening to stimulation or voice, Pupils bilaterally 3 mm non-reactive, + cough/gag/overbreathing, Right upper extremity localizing, Left upper extremity localizing, bilateral lower extremities triple flexion. Emergent CT of the brain showed development of large intraparenchymal hemorrhage centered in the left subinsular region, measuring approximately 8.6 x 3.1 x 5.2 cm.  New mass effect in the left cerebellar hemisphere, with effacement of the left lateral ventricle and approximately 5 mm midline shift to the right.  Adjacent subarachnoid hemorrhage in the left sylvian fissure is grossly stable. At this time, Intracranial pressure management with hyperosmolar therapy, hyperventilation and elevation was head was initiated. 2g TXA trauma protocol started given cirrhosis associated emergent ongoing life-threatening hematoma expansion in brain. Due to platelet dysfunction secondary to ETOH, multiple units of platelets were given. Per Neurosurgery potential interventions were discussed with Dr. Isai Hargrove; however, given the patient's minimal response to platelets, hyperosmolar therapy the size in brain hematoma and severity of the bleed that any neurosurgical intervention would be futile at best. No neurosurgical intervention offered given poor surgical candidate with poor prognosis. The grave prognosis was discussed with family in details with Mother, Amelie Lazaro. On 4/8/24 NSCU held a goals of care conversation with Mother, Amelie Lazaro, in which a MOLST was completed and do not resuscitate established. On 4/10/24 Patient was extubated at 1446. Pulseless Electrical Activity was observed at 1732 on 4/10/24. No pulse, no respirations observed, and no sounds on auscultation. Observation period for two minutes as per hospital policy.      Patient was pronounced dead at 1734 on 4/10/24 by Caroline Mary PA-C and Tracey Hodgson MD Fellow.   Neurointensivist Ronald Mayers MD was notified on 4/10/24 1736  Neurosurgery Isai Hargrove MD was notified on 4/10/24 [id number]
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
## 2024-04-10 NOTE — PROGRESS NOTE ADULT - SUBJECTIVE AND OBJECTIVE BOX
Patient is a 43 year-old-male with history of alcohol misuse disorder presents for head trauma and concerns for alcohol withdrawal. Patient reports that he tripped this morning and fell forward and hit the front of his head. Denies LOC. Also reports concerns for alcohol withdrawal as he has been through it and he is worries, reports last drink was 11am. Reports feeling shaky but denies other complaints. Denies fever, chills, vomiting, chest pain, shortness of breath, abdominal pain. CTH in ED w/ small sylvian fissure tSAH, 4 hour interval scan with worsening IPH    ICH Score 2    Review of Systems  Unable to participate 2/2 neuro injury    Allergies    No Known Allergies    Intolerances    ICU Vital Signs Last 24 Hrs  T(C): 36.9 (08 Apr 2024 00:45), Max: 36.9 (08 Apr 2024 00:34)  T(F): 98.5 (08 Apr 2024 00:45), Max: 98.5 (08 Apr 2024 00:34)  HR: 99 (08 Apr 2024 00:45) (98 - 133)  BP: 145/77 (08 Apr 2024 00:34) (115/80 - 145/77)  BP(mean): 87 (07 Apr 2024 19:32) (87 - 87)  ABP: --  ABP(mean): --  RR: 19 (08 Apr 2024 00:45) (14 - 20)  SpO2: 98% (08 Apr 2024 00:45) (96% - 100%)    O2 Parameters below as of 08 Apr 2024 00:45  Patient On (Oxygen Delivery Method): room air                          12.4   3.25  )-----------( 67       ( 07 Apr 2024 19:01 )             34.6   04-07    130<L>  |  85<L>  |  8   ----------------------------<  92  4.7   |  22  |  0.84    Ca    9.2      07 Apr 2024 19:01  Mg     1.7     04-07    TPro  8.2  /  Alb  4.8  /  TBili  2.0<H>  /  DBili  x   /  AST  342<H>  /  ALT  216<H>  /  AlkPhos  113  04-07    < from: CT Head No Cont (04.07.24 @ 20:17) >  INTERPRETATION:  CLINICAL INDICATION: Status post fall with frontal   hematoma    TECHNIQUE: Noncontrast CT of the head, and cervical spine is performed in   two separate helical CT acquisitions. Images displayed in bone and soft   tissue algorithm, and reviewed in sagittal and coronal reformatted planes.    COMPARISON: Head CT 01/25/2024.    FINDINGS:    HEAD CT:    There is left forehead contusive change/hematoma. No radiopaque foreign   body or gas collection.    Acute subarachnoid blood is present in the left at posterior sylvian and   lateral frontotemporal convexity. No parenchymal hemorrhage or infarct.    There is mild diffuse volume loss without hydrocephalus. No mass effect   or midline shift.    On bony window inspection, there is no calvarial fracture. The   tympanomastoid cavities and included paranasalsinuses are well-aerated.      CERVICAL SPINE CT:    Alignment: Lordosis is straightened but without spondylolisthesis, facet   subluxation or malalignment at the craniovertebral or atlantoaxial   articulations.    Vertebrae: No fracture or focal/aggressive bony lesion    Disc spaces: Preserved in height. No bony spinal stenosis or large   herniation. Neural foramina appear patent as well.    Soft tissues: No prevertebral edema.      IMPRESSION:    HEAD CT:    Acute subarachnoid hemorrhage at left sylvian/frontotemporal convexity.    Case discussed with Dr. Swift at 8:30 PM on 04/07/2024.      CERVICAL SPINE CT:    No acute fracture or traumatic malalignment.    --- End of Report ---    < end of copied text >    < from: CT Chest w/ IV Cont (04.08.24 @ 00:19) >    INTERPRETATION:  CLINICAL INFORMATION: Trauma.    COMPARISON: CTA chest from 9/29/2022. CT abdomen pelvis 9/6/2011    CONTRAST/COMPLICATIONS:  IV Contrast: Omnipaque 350 (accession [id number]), IV contrast documented   in unlinked concurrent exam (accession [id number])  90 cc administered   10   cc discarded  Oral Contrast: NONE  Complications: None reported at time of study completion    PROCEDURE:  CT of the Chest, Abdomen and Pelvis was performed.  Imaging was performed through the chest in the arterial phase followed by   imaging of the abdomen and pelvis in the portal venous phase.  Sagittal and coronal reformats were performed.    FINDINGS:  CHEST:  LUNGS AND LARGE AIRWAYS: Patent central airways. No pulmonary nodules or   parenchymal consolidation.  PLEURA: No pleural effusion.  VESSELS: Within normal limits.  HEART: Heart size is normal. No pericardial effusion.  MEDIASTINUM AND KAREN: No lymphadenopathy.  CHEST WALL AND LOWER NECK: Within normal limits.    ABDOMEN AND PELVIS:  LIVER: Steatosis.  BILE DUCTS: Normal caliber.  GALLBLADDER: Within normal limits.  SPLEEN: Within normal limits.  PANCREAS: Within normal limits.  ADRENALS: Within normal limits.  KIDNEYS/URETERS: Within normal limits.    BLADDER: Within normal limits.  REPRODUCTIVE ORGANS: Prostate within normal limits.    BOWEL: No bowel obstruction. Appendix is normal.  PERITONEUM: No ascites.  VESSELS: Within normal limits.  RETROPERITONEUM/LYMPH NODES: No lymphadenopathy.  ABDOMINAL WALL: Within normal limits.  BONES: Degenerative changes. Fractures of the left anterior sixth and   seventh ribs appear subacute    IMPRESSION:  No acute intrathoracic, abdominal, or pelvic findings.    Fractures of the left anterior sixth and seventh ribs appear subacute    --- End of Report ---    < end of copied text >    < from: CT Abdomen and Pelvis w/ IV Cont (04.08.24 @ 00:19) >  INTERPRETATION:  CLINICAL INFORMATION: Trauma.    COMPARISON: CTA chest from 9/29/2022. CT abdomen pelvis 9/6/2011    CONTRAST/COMPLICATIONS:  IV Contrast: Omnipaque 350 (accession [id number]), IV contrast documented   in unlinked concurrent exam (accession [id number])  90 cc administered   10   cc discarded  Oral Contrast: NONE  Complications: None reported at time of study completion    PROCEDURE:  CT of the Chest, Abdomen and Pelvis was performed.  Imaging was performed through the chest in the arterial phase followed by   imaging of the abdomen and pelvis in the portal venous phase.  Sagittal and coronal reformats were performed.    FINDINGS:  CHEST:  LUNGS AND LARGE AIRWAYS: Patent central airways. No pulmonary nodules or   parenchymal consolidation.  PLEURA: No pleural effusion.  VESSELS: Within normal limits.  HEART: Heart size is normal. No pericardial effusion.  MEDIASTINUM AND KAREN: No lymphadenopathy.  CHEST WALL AND LOWER NECK: Within normal limits.    ABDOMEN AND PELVIS:  LIVER: Steatosis.  BILE DUCTS: Normal caliber.  GALLBLADDER: Within normal limits.  SPLEEN: Within normal limits.  PANCREAS: Within normal limits.  ADRENALS: Within normal limits.  KIDNEYS/URETERS: Within normal limits.    BLADDER: Within normal limits.  REPRODUCTIVE ORGANS: Prostate within normal limits.    BOWEL: No bowel obstruction. Appendix is normal.  PERITONEUM: No ascites.  VESSELS: Within normal limits.  RETROPERITONEUM/LYMPH NODES: No lymphadenopathy.  ABDOMINAL WALL: Within normal limits.  BONES: Degenerative changes. Fractures of the left anterior sixth and   seventh ribs appear subacute    IMPRESSION:  No acute intrathoracic, abdominal, or pelvic findings.    Fractures of the left anterior sixth and seventh ribs appear subacute    --- End of Report ---    < end of copied text >    Physical Exam:  Neuro: No EO, Pupils 3 mm NR, + cough/gag/OB RUE localizing, LUE localizing, B/L LE TF  Resp: CTA BL  CV: +s1s2, rrr  Abd: soft ntndx4

## 2024-04-10 NOTE — CHART NOTE - NSCHARTNOTESELECT_GEN_ALL_CORE
Palliative care/Event Note
VTE Risk Assessment/Event Note
Neurosurgery/Event Note
Time of Death/Event Note

## 2024-04-10 NOTE — PROGRESS NOTE ADULT - REASON FOR ADMISSION
expansion of SAH/IPH

## 2024-04-10 NOTE — DIETITIAN INITIAL EVALUATION ADULT - NSFNSPHYEXAMSKINFT_GEN_A_CORE
sacral spine suspected deep tissue injury   right elbow suspected deep tissue injury   per flow sheets

## 2024-04-10 NOTE — DIETITIAN INITIAL EVALUATION ADULT - OTHER INFO
Per Consult Note Adult-Palliative Care NP 4/09/24:  Per discussion with ICU fellow, compassionate extubation scheduled for tomorrow at 10.

## 2024-04-10 NOTE — PRE-ANESTHESIA EVALUATION ADULT - NSANTHADDINFOFT_GEN_ALL_CORE
The patient record was reviewed prior to the patient arriving to the operating room. The family was not contacted by me.

## 2024-04-10 NOTE — DIETITIAN INITIAL EVALUATION ADULT - PERTINENT MEDS FT
MEDICATIONS  (STANDING):  acetylcysteine 10%  Inhalation 4 milliLiter(s) Inhalation every 4 hours  albuterol/ipratropium for Nebulization 3 milliLiter(s) Nebulizer every 6 hours  artificial  tears Solution 1 Drop(s) Both EYES every 2 hours  chlorhexidine 0.12% Liquid 15 milliLiter(s) Oral Mucosa every 12 hours  chlorhexidine 4% Liquid 1 Application(s) Topical daily  dexMEDEtomidine Infusion 0.2 MICROgram(s)/kG/Hr (4.2 mL/Hr) IV Continuous <Continuous>  dextrose 10% Bolus 125 milliLiter(s) IV Bolus once  dextrose 5%. 1000 milliLiter(s) (100 mL/Hr) IV Continuous <Continuous>  dextrose 5%. 1000 milliLiter(s) (50 mL/Hr) IV Continuous <Continuous>  dextrose 50% Injectable 12.5 Gram(s) IV Push once  dextrose 50% Injectable 25 Gram(s) IV Push once  dextrose 50% Injectable 25 milliLiter(s) IV Push once  folic acid 1 milliGRAM(s) Oral daily  glucagon  Injectable 1 milliGRAM(s) IntraMuscular once  insulin lispro (ADMELOG) corrective regimen sliding scale   SubCutaneous three times a day before meals  levETIRAcetam  IVPB 750 milliGRAM(s) IV Intermittent two times a day  multivitamin 1 Tablet(s) Oral daily  PHENobarbital Injectable 80 milliGRAM(s) IV Push three times a day  piperacillin/tazobactam IVPB.. 3.375 Gram(s) IV Intermittent every 8 hours  polyethylene glycol 3350 17 Gram(s) Oral two times a day  senna 2 Tablet(s) Oral at bedtime  sodium chloride 3% + sodium acetate 50:50 1000 milliLiter(s) (30 mL/Hr) IV Continuous <Continuous>  thiamine 100 milliGRAM(s) Oral daily    MEDICATIONS  (PRN):  acetaminophen     Tablet .. 650 milliGRAM(s) Oral every 6 hours PRN Temp greater or equal to 38C (100.4F), Mild Pain (1 - 3)  dextrose Oral Gel 15 Gram(s) Oral once PRN Blood Glucose LESS THAN 70 milliGRAM(s)/deciliter  oxyCODONE    IR 5 milliGRAM(s) Oral every 4 hours PRN Moderate Pain (4 - 6)  oxyCODONE    IR 10 milliGRAM(s) Oral every 4 hours PRN Severe Pain (7 - 10)  sodium chloride 0.9% lock flush 10 milliLiter(s) IV Push every 1 hour PRN Pre/post blood products, medications, blood draw, and to maintain line patency

## 2024-04-10 NOTE — DIETITIAN INITIAL EVALUATION ADULT - REASON INDICATOR FOR ASSESSMENT
seen for length of stay. information obtained from nursing, electronic medical record, team during interdisciplinary rounds  length of stay. information obtained from nursing, electronic medical record, team during interdisciplinary rounds

## 2024-04-10 NOTE — PROGRESS NOTE ADULT - THIS PATIENT HAS THE FOLLOWING CONDITION(S)/DIAGNOSES ON THIS ADMISSION:
Brain Compression / Herniation
Functional Quadriplegia/Cerebral Edema/Brain Compression / Herniation
Functional Quadriplegia/Cerebral Edema/Brain Compression / Herniation
Brain Compression / Herniation

## 2024-04-10 NOTE — DIETITIAN INITIAL EVALUATION ADULT - ORAL INTAKE PTA/DIET HISTORY
intubated, unable to obtain diet recall at this time. per dietitian initial assessment 9/1/23: No known food allergies. Patient intake 100% of meals. Patient is also being bought food. No chewing or swallowing issues. Denies nausea or vomiting. Patient has some dirrhea, likely due to medications. Last bowel movement was today. Usual body weight 170 lbs.  intubated. no known allergies noted in chart

## 2024-04-10 NOTE — CHART NOTE - NSCHARTNOTEFT_GEN_A_CORE
On 4/9/24 Mother, Amelie Lazaro, consented and wished for patients organs to be donated. On 4/10/24 Patient was extubated at 1446. Pulseless Electrical Activity was observed at 1732 on 4/10/24. No pulse, no respirations observed, and no sounds on auscultation. Observation period for two minutes as per hospital policy.      Patient was pronounced dead at 1734 on 4/10/24 by Caroline Mary PA-C and Tracey Hodgson MD.   Neurointensivist Ronald Mayers MD was notified on 4/10/24 1736  Neurosurgery Isai Hargrove MD was notified on 4/10/24 0303 On 4/10/24 Patient was extubated at 1446. Pulseless Electrical Activity was observed at 1732 on 4/10/24. No pulse, no respirations observed, and no sounds on auscultation. Observation period for two minutes as per hospital policy.      Patient was pronounced dead at 1734 on 4/10/24 by Caroline Mary PA-C and Tracey Hodgson, Fellow MD.   Neurointensivist Ronald Mayers MD was notified on 4/10/24 1736  Neurosurgery Isai Hargrove MD was notified on 4/10/24 0153

## 2024-04-11 LAB — FIBRINOGEN AG PPP IA-MCNC: 308 MG/DL — SIGNIFICANT CHANGE UP (ref 206–478)

## 2024-04-13 LAB
CULTURE RESULTS: SIGNIFICANT CHANGE UP
CULTURE RESULTS: SIGNIFICANT CHANGE UP
SPECIMEN SOURCE: SIGNIFICANT CHANGE UP
SPECIMEN SOURCE: SIGNIFICANT CHANGE UP

## 2024-12-30 NOTE — DIETITIAN INITIAL EVALUATION ADULT - HEIGHT FOR BMI (FEET)
Continue Regimen: triamcinolone acetonide 0.025 % topical cream BID\\nQuantity: 15.0 g  Days Supply: 30\\nSig: Apply to the affected areas of eczema on the face x 1-2 days as needed
Detail Level: Zone
5

## 2025-04-07 NOTE — ED PROVIDER NOTE - TEMPLATE, MLM
Patient's message:  \"Could you please have scheduling call me to set up a mammogram. I would like to complete it and have any results prior to my annual check up.      Thank\"    Mammogram is placed. Upcoming appointment with Dr. Luevano on 6/12/2025.    PSR's: Please call and assist the patient with scheduling her mammogram. Thanks.  
General

## (undated) DEVICE — GLV 8.5 PROTEXIS (WHITE)

## (undated) DEVICE — DRAPE ISOLATION BAG 20X20"

## (undated) DEVICE — SOL IRR POUR NS 0.9% 500ML

## (undated) DEVICE — DRAPE SLUSH / WARMER 44 X 66"

## (undated) DEVICE — SUT SOFSILK 4-0 30" TIES

## (undated) DEVICE — DRAPE 3/4 SHEET W REINFORCEMENT 56X77"

## (undated) DEVICE — BLADE SCALPEL SAFETYLOCK #15

## (undated) DEVICE — DRAPE IOBAN 23" X 23"

## (undated) DEVICE — SUT PROLENE 5-0 36" RB-1

## (undated) DEVICE — STAPLER SKIN VISI-STAT 35 WIDE

## (undated) DEVICE — ELCTR BOVIE PENCIL SMOKE EVACUATION

## (undated) DEVICE — PREP CHLORAPREP HI-LITE ORANGE 26ML

## (undated) DEVICE — STAPLER COVIDIEN ENDO GIA SHORT HANDLE

## (undated) DEVICE — SUT SOFSILK 2-0 30" V-20

## (undated) DEVICE — PACK MAJOR ABDOMINAL SUPINE

## (undated) DEVICE — GLV 7.5 PROTEXIS (WHITE)

## (undated) DEVICE — PACK POST MORTEM ADULT

## (undated) DEVICE — SOL IRR POUR H2O 250ML

## (undated) DEVICE — PREP BETADINE KIT

## (undated) DEVICE — SUT SOFSILK 1 30" TIES

## (undated) DEVICE — POSITIONER FOAM EGG CRATE ULNAR 2PCS (PINK)

## (undated) DEVICE — GOWN LG

## (undated) DEVICE — DRAPE TOWEL BLUE 17" X 24"

## (undated) DEVICE — APPLICATOR ESWAB 1ML LIQUID AMIES REG

## (undated) DEVICE — PACK BASIN SPECIAL PROCEDURE

## (undated) DEVICE — SUT PROLENE 4-0 36" SH

## (undated) DEVICE — TOURNIQUET SET SURE-SNARE 22FR (2 TUBES, 2 UMBILICAL TAPES, 2 PLASTIC SNARES) 5"

## (undated) DEVICE — GLV 8 PROTEXIS (WHITE)

## (undated) DEVICE — WARMING BLANKET LOWER ADULT

## (undated) DEVICE — GLV 6.5 PROTEXIS (WHITE)

## (undated) DEVICE — POOLE SUCTION TIP

## (undated) DEVICE — GLV 7 PROTEXIS (WHITE)

## (undated) DEVICE — TUBING SUCTION 20FT

## (undated) DEVICE — TUBING TUR 2 PRONG

## (undated) DEVICE — SUT SOFSILK 4-0 30" V-20

## (undated) DEVICE — BLADE SCALPEL SAFETYLOCK #10

## (undated) DEVICE — VENODYNE/SCD SLEEVE CALF MEDIUM

## (undated) DEVICE — PACK CV SPLIT PACK II

## (undated) DEVICE — PACK CARDIOVASCULAR UNIVERSAL

## (undated) DEVICE — DRAPE MAYO STAND 30"

## (undated) DEVICE — DRSG TAPE UMBILICAL COTTON 2" X 30 X 1/8"

## (undated) DEVICE — SUT SOFSILK 0 30" V-20

## (undated) DEVICE — SUT SOFSILK 3-0 30" V-20

## (undated) DEVICE — VESSEL LOOP MAXI-RED  0.120" X 16"

## (undated) DEVICE — SPECIMEN CONTAINER 100ML

## (undated) DEVICE — DRAPE 1/2 SHEET 40X57"

## (undated) DEVICE — DRAPE INSTRUMENT POUCH 6.75" X 11"

## (undated) DEVICE — SAW BLADE MICROAIRE STERNUM 1X34X9.4MM